# Patient Record
Sex: FEMALE | Race: WHITE | NOT HISPANIC OR LATINO | Employment: FULL TIME | URBAN - METROPOLITAN AREA
[De-identification: names, ages, dates, MRNs, and addresses within clinical notes are randomized per-mention and may not be internally consistent; named-entity substitution may affect disease eponyms.]

---

## 2017-04-24 ENCOUNTER — OFFICE VISIT (OUTPATIENT)
Dept: URGENT CARE | Facility: URGENT CARE | Age: 22
End: 2017-04-24
Payer: COMMERCIAL

## 2017-04-24 VITALS
WEIGHT: 266 LBS | DIASTOLIC BLOOD PRESSURE: 86 MMHG | OXYGEN SATURATION: 99 % | HEART RATE: 64 BPM | TEMPERATURE: 97.9 F | SYSTOLIC BLOOD PRESSURE: 128 MMHG

## 2017-04-24 DIAGNOSIS — M54.6 ACUTE RIGHT-SIDED THORACIC BACK PAIN: Primary | ICD-10-CM

## 2017-04-24 PROCEDURE — 99203 OFFICE O/P NEW LOW 30 MIN: CPT | Performed by: FAMILY MEDICINE

## 2017-04-24 RX ORDER — METHOCARBAMOL 750 MG/1
750 TABLET, FILM COATED ORAL 4 TIMES DAILY PRN
Qty: 28 TABLET | Refills: 0 | Status: SHIPPED | OUTPATIENT
Start: 2017-04-24 | End: 2017-05-01

## 2017-04-24 NOTE — MR AVS SNAPSHOT
"              After Visit Summary   2017    Misty Disla    MRN: [id number]           Patient Information     Date Of Birth          1995        Visit Information        Provider Department      2017 1:30 PM Suresh Kennedy DO [facility]        Today's Diagnoses     Acute right-sided thoracic back pain    -  1       Follow-ups after your visit        Who to contact     If you have questions or need follow up information about today's clinic visit or your schedule please contact [facility] directly at [phone].  Normal or non-critical lab and imaging results will be communicated to you by [employer], letter or phone within 4 business days after the clinic has received the results. If you do not hear from us within 7 days, please contact the clinic through Zupp[employer] or phone. If you have a critical or abnormal lab result, we will notify you by phone as soon as possible.  Submit refill requests through [employer] or call your pharmacy and they will forward the refill request to us. Please allow 3 business days for your refill to be completed.          Additional Information About Your Visit        MyChart Information     [employer] lets you send messages to your doctor, view your test results, renew your prescriptions, schedule appointments and more. To sign up, go to [email or web address] . Click on \"Log in\" on the left side of the screen, which will take you to the Welcome page. Then click on \"Sign up Now\" on the right side of the page.     You will be asked to enter the access code listed below, as well as some personal information. Please follow the directions to create your username and password.     Your access code is: [email or web address]  Expires: 2017  2:09 PM     Your access code will  in 90 days. If you need help or a new code, please call your [place] clinic or [phone].        Care EveryWhere ID     This is your Care " EveryWhere ID. This could be used by other organizations to access your [place] medical records  [id number]        Your Vitals Were     Pulse Temperature Pulse Oximetry             64 97.9  F (36.6  C) (Oral) 99%          Blood Pressure from Last 3 Encounters:   04/24/17 128/86    Weight from Last 3 Encounters:   04/24/17 266 lb (120.7 kg)              Today, you had the following     No orders found for display         Today's Medication Changes          These changes are accurate as of: 4/24/17  2:10 PM.  If you have any questions, ask your nurse or doctor.               Start taking these medicines.        Dose/Directions    methocarbamol 750 MG tablet   Commonly known as:  ROBAXIN   Used for:  Acute right-sided thoracic back pain   Started by:  Suresh Kennedy,         Dose:  750 mg   Take 1 tablet (750 mg) by mouth 4 times daily as needed for muscle spasms   Quantity:  28 tablet   Refills:  0            Where to get your medicines      These medications were sent to [facility] [address] [address]     Phone:  [phone]     methocarbamol 750 MG tablet                Primary Care Provider    None Specified       No primary provider on file.        Thank you!     Thank you for choosing [facility]  for your care. Our goal is always to provide you with excellent care. Hearing back from our patients is one way we can continue to improve our services. Please take a few minutes to complete the written survey that you may receive in the mail after your visit with us. Thank you!             Your Updated Medication List - Protect others around you: Learn how to safely use, store and throw away your medicines at www.disposemymeds.org.          This list is accurate as of: 4/24/17  2:10 PM.  Always use your most recent med list.                   Brand Name Dispense Instructions for use    IBUPROFEN PO           methocarbamol 750 MG tablet    ROBAXIN    28 tablet    Take 1 tablet (750 mg) by mouth 4 times daily as needed for muscle spasms       NASACORT ALLERGY 24HR NA          NEXPLANON SC

## 2017-04-24 NOTE — PROGRESS NOTES
SUBJECTIVEHPI: Misty Disla is a 22 year old female who presents for evaluation of back pain.  Symptoms began 1 day(s) ago, have been onset gradual and are worse.  Pain is located in the middle of back right region, with radiation to does not radiate.  Recent injury:none recalled by the patient  Personal hx of back pain is recurrent self limited episodes of low back pain in the past.  Pain is exacerbated by: bending and changing position.  Pain is relieved by: OTC NSAIDs.  Associated sx include: none.  Red flag symptoms: negative.  Noted onset yesterday after 3 hr car ride    No past medical history on file.  Allergies   Allergen Reactions     Dust Mites      Pollen Extract      Social History   Substance Use Topics     Smoking status: Never Smoker     Smokeless tobacco: Not on file     Alcohol use Not on file       ROS:CONSTITUTIONAL:NEGATIVE for fever, chills, change in weight  RESP:NEGATIVE for significant cough or SOB  : normal menstrual cycles, no hematuria/dysuria    OBJECTIVE:  /86 (BP Location: Left arm, Patient Position: Chair, Cuff Size: Adult Regular)  Pulse 64  Temp 97.9  F (36.6  C) (Oral)  Wt 266 lb (120.7 kg)  SpO2 99%  Back examination: Back symmetric, no curvature. ROM normal. No CVA tenderness., positive findings: Palpation and spasm rt mid to low backlimitation of motion - flexion: Moderate and extension: Moderate.  Straight leg raise test: negative.  GENERAL APPEARANCE: healthy, alert and no distress  NEURO: Normal strength and tone with no weakness or sensory deficit noted, reflexes normal   SKIN: no suspicious lesions or rashes      ICD-10-CM    1. Acute right-sided thoracic back pain M54.6 methocarbamol (ROBAXIN) 750 MG tablet     Continue prn heat or ice application.  Took Ibuprofen earlier today and feels like it helped  F/U PCP/IM/FP if persists, ED if worse

## 2017-04-24 NOTE — NURSING NOTE
Chief Complaint   Patient presents with     Back Pain     x last nigt which got worse today. No known injury        Initial /86 (BP Location: Left arm, Patient Position: Chair, Cuff Size: Adult Regular)  Pulse 64  Temp 97.9  F (36.6  C) (Oral)  Wt 266 lb (120.7 kg)  SpO2 99% There is no height or weight on file to calculate BMI.  Medication Reconciliation: complete

## 2017-11-20 ENCOUNTER — OFFICE VISIT (OUTPATIENT)
Dept: URGENT CARE | Facility: URGENT CARE | Age: 22
End: 2017-11-20
Payer: COMMERCIAL

## 2017-11-20 VITALS
DIASTOLIC BLOOD PRESSURE: 104 MMHG | HEART RATE: 95 BPM | TEMPERATURE: 97.2 F | WEIGHT: 263.3 LBS | OXYGEN SATURATION: 96 % | SYSTOLIC BLOOD PRESSURE: 152 MMHG | RESPIRATION RATE: 16 BRPM

## 2017-11-20 DIAGNOSIS — J06.9 VIRAL URI: Primary | ICD-10-CM

## 2017-11-20 PROCEDURE — 99213 OFFICE O/P EST LOW 20 MIN: CPT | Performed by: FAMILY MEDICINE

## 2017-11-20 RX ORDER — FLUTICASONE PROPIONATE 50 MCG
2 SPRAY, SUSPENSION (ML) NASAL DAILY
Qty: 1 BOTTLE | Refills: 0 | Status: SHIPPED | OUTPATIENT
Start: 2017-11-20 | End: 2017-12-20

## 2017-11-20 NOTE — NURSING NOTE
Chief Complaint   Patient presents with     Urgent Care     Pt states cold sxs, sore throat, sinus congestion 4x days        Initial BP (!) 152/104 (BP Location: Left arm, Patient Position: Chair, Cuff Size: Adult Regular)  Pulse 95  Temp 97.2  F (36.2  C) (Oral)  Resp 16  Wt 263 lb 4.8 oz (119.4 kg)  SpO2 96% There is no height or weight on file to calculate BMI.  Medication Reconciliation: complete

## 2017-11-20 NOTE — PATIENT INSTRUCTIONS

## 2017-11-20 NOTE — PROGRESS NOTES
SUBJECTIVE: Misty Disla is a 22 year old female presenting with a chief complaint of nasal congestion and cough .  Onset of symptoms was 3 day(s) ago.  Course of illness is same.    Severity moderate  Current and Associated symptoms: runny nose, stuffy nose and cough - non-productive  Treatment measures tried include None tried.  Predisposing factors include None.    No past medical history on file.  Allergies   Allergen Reactions     Dust Mites      Pollen Extract      Social History   Substance Use Topics     Smoking status: Never Smoker     Smokeless tobacco: Not on file     Alcohol use Not on file       ROS:  SKIN: no rash  GI: no vomiting    OBJECTIVE:  BP (!) 152/104 (BP Location: Left arm, Patient Position: Chair, Cuff Size: Adult Regular)  Pulse 95  Temp 97.2  F (36.2  C) (Oral)  Resp 16  Wt 263 lb 4.8 oz (119.4 kg)  SpO2 96%GENERAL APPEARANCE: healthy, alert and no distress  EYES: EOMI,  PERRL, conjunctiva clear  HENT: TM's normal bilaterally, rhinorrhea clear and oral mucous membranes moist, no erythema noted  NECK: supple, nontender, no lymphadenopathy  RESP: lungs clear to auscultation - no rales, rhonchi or wheezes  SKIN: no suspicious lesions or rashes      ICD-10-CM    1. Viral URI J06.9 fluticasone (FLONASE) 50 MCG/ACT spray    B97.89        Fluids/Rest, f/u if worse/not any better

## 2017-11-20 NOTE — MR AVS SNAPSHOT
After Visit Summary   11/20/2017    Misty Disla    MRN: [id number]           Patient Information     Date Of Birth          1995        Visit Information        Provider Department      11/20/2017 10:25 AM Suresh Kennedy DO [facility]        Today's Diagnoses     Viral URI    -  1      Care Instructions      Viral Upper Respiratory Illness (Adult)  You have a viral upper respiratory illness (URI), which is another term for the common cold. This illness is contagious during the first few days. It is spread through the air by coughing and sneezing. It may also be spread by direct contact (touching the sick person and then touching your own eyes, nose, or mouth). Frequent handwashing will decrease risk of spread. Most viral illnesses go away within 7 to 10 days with rest and simple home remedies. Sometimes the illness may last for several weeks. Antibiotics will not kill a virus, and they are generally not prescribed for this condition.    Home care    If symptoms are severe, rest at home for the first 2 to 3 days. When you resume activity, don't let yourself get too tired.    Avoid being exposed to cigarette smoke (yours or others ).    You may use acetaminophen or ibuprofen to control pain and fever, unless another medicine was prescribed. (Note: If you have chronic liver or kidney disease, have ever had a stomach ulcer or gastrointestinal bleeding, or are taking blood-thinning medicines, talk with your healthcare provider before using these medicines.) Aspirin should never be given to anyone under 18 years of age who is ill with a viral infection or fever. It may cause severe liver or brain damage.    Your appetite may be poor, so a light diet is fine. Avoid dehydration by drinking 6 to 8 glasses of fluids per day (water, soft drinks, juices, tea, or soup). Extra fluids will help loosen secretions in the nose and lungs.    Over-the-counter cold medicines  will not shorten the length of time you re sick, but they may be helpful for the following symptoms: cough, sore throat, and nasal and sinus congestion. (Note: Do not use decongestants if you have high blood pressure.)  Follow-up care  Follow up with your healthcare provider, or as advised.  When to seek medical advice  Call your healthcare provider right away if any of these occur:    Cough with lots of colored sputum (mucus)    Severe headache; face, neck, or ear pain    Difficulty swallowing due to throat pain    Fever of 100.4 F (38 C)  Call 911, or get immediate medical care  Call emergency services right away if any of these occur:    Chest pain, shortness of breath, wheezing, or difficulty breathing    Coughing up blood    Inability to swallow due to throat pain  Date Last Reviewed: 9/13/2015 2000-2017 [employer]. [address] [place] [address]. All rights reserved. This information is not intended as a substitute for professional medical care. Always follow your healthcare professional's instructions.                Follow-ups after your visit        Who to contact     If you have questions or need follow up information about today's clinic visit or your schedule please contact [facility] directly at [phone].  Normal or non-critical lab and imaging results will be communicated to you by [employer], letter or phone within 4 business days after the clinic has received the results. If you do not hear from us within 7 days, please contact the clinic through [employer] or phone. If you have a critical or abnormal lab result, we will notify you by phone as soon as possible.  Submit refill requests through [employer] or call your pharmacy and they will forward the refill request to us. Please allow 3 business days for your refill to be completed.          Additional Information About Your Visit        Oink Information     [employer] lets you send messages to  "your doctor, view your test results, renew your prescriptions, schedule appointments and more. To sign up, go to [email or web address] . Click on \"Log in\" on the left side of the screen, which will take you to the Welcome page. Then click on \"Sign up Now\" on the right side of the page.     You will be asked to enter the access code listed below, as well as some personal information. Please follow the directions to create your username and password.     Your access code is: [email or web address]  Expires: 2018 10:55 AM     Your access code will  in 90 days. If you need help or a new code, please call your [place] clinic or [phone].        Care EveryWhere ID     This is your Care EveryWhere ID. This could be used by other organizations to access your [place] medical records  [id number]        Your Vitals Were     Pulse Temperature Respirations Pulse Oximetry          95 97.2  F (36.2  C) (Oral) 16 96%         Blood Pressure from Last 3 Encounters:   17 (!) 152/104   17 128/86    Weight from Last 3 Encounters:   17 263 lb 4.8 oz (119.4 kg)   17 266 lb (120.7 kg)              Today, you had the following     No orders found for display         Today's Medication Changes          These changes are accurate as of: 17 10:55 AM.  If you have any questions, ask your nurse or doctor.               Start taking these medicines.        Dose/Directions    fluticasone 50 MCG/ACT spray   Commonly known as:  FLONASE   Used for:  Viral URI   Started by:  Suresh Kennedy DO        Dose:  2 spray   Spray 2 sprays in nostril daily   Quantity:  1 Bottle   Refills:  0            Where to get your medicines      These medications were sent to [facility] [address] [address]     Phone:  [phone]     fluticasone 50 MCG/ACT spray                Primary Care Provider    Physician No Ref-Primary       NO REF-PRIMARY " PHYSICIAN        Equal Access to Services     [facility] HORACIO : Hadii erik Jeffrey, walinada luqadaha, qaybta mayra marie. So [facility] [phone].    ATENCIÓN: Si habla español, tiene a barron disposición servicios gratuitos de asistencia lingüística. Llame al [phone].    We comply with applicable federal civil rights laws and [place] laws. We do not discriminate on the basis of race, color, national origin, age, disability, sex, sexual orientation, or gender identity.            Thank you!     Thank you for choosing [facility]  for your care. Our goal is always to provide you with excellent care. Hearing back from our patients is one way we can continue to improve our services. Please take a few minutes to complete the written survey that you may receive in the mail after your visit with us. Thank you!             Your Updated Medication List - Protect others around you: Learn how to safely use, store and throw away your medicines at www.disposemymeds.org.          This list is accurate as of: 11/20/17 10:55 AM.  Always use your most recent med list.                   Brand Name Dispense Instructions for use Diagnosis    fluticasone 50 MCG/ACT spray    FLONASE    1 Bottle    Spray 2 sprays in nostril daily    Viral URI       IBUPROFEN PO           NASACORT ALLERGY 24HR NA           NEXPLANON SC

## 2017-11-20 NOTE — LETTER
[facility] [place]  [address] [address]  [phone]      November 20, 2017    RE:  Misty Disla                                                                                                                                                       [address]   [facility] [address]            To whom it may concern:    Misty Disla is under my professional care for Medical.   She  may return to work with the following: No working or lifting restrictions on or about when improved.          Sincerely,        Suresh Kennedy    [facility]

## 2018-12-11 ENCOUNTER — OFFICE VISIT (OUTPATIENT)
Dept: URGENT CARE | Facility: URGENT CARE | Age: 23
End: 2018-12-11
Payer: COMMERCIAL

## 2018-12-11 ENCOUNTER — ANCILLARY PROCEDURE (OUTPATIENT)
Dept: GENERAL RADIOLOGY | Facility: CLINIC | Age: 23
End: 2018-12-11
Attending: PHYSICIAN ASSISTANT
Payer: COMMERCIAL

## 2018-12-11 VITALS
WEIGHT: 275 LBS | HEART RATE: 95 BPM | TEMPERATURE: 97.7 F | DIASTOLIC BLOOD PRESSURE: 92 MMHG | OXYGEN SATURATION: 98 % | SYSTOLIC BLOOD PRESSURE: 137 MMHG

## 2018-12-11 DIAGNOSIS — R10.32 ABDOMINAL PAIN, LEFT LOWER QUADRANT: ICD-10-CM

## 2018-12-11 DIAGNOSIS — R10.32 ABDOMINAL PAIN, LEFT LOWER QUADRANT: Primary | ICD-10-CM

## 2018-12-11 DIAGNOSIS — R12 HEARTBURN: ICD-10-CM

## 2018-12-11 DIAGNOSIS — R14.3 FLATULENCE, ERUCTATION AND GAS PAIN: ICD-10-CM

## 2018-12-11 DIAGNOSIS — R14.2 FLATULENCE, ERUCTATION AND GAS PAIN: ICD-10-CM

## 2018-12-11 DIAGNOSIS — R14.1 FLATULENCE, ERUCTATION AND GAS PAIN: ICD-10-CM

## 2018-12-11 LAB
ALBUMIN UR-MCNC: NEGATIVE MG/DL
APPEARANCE UR: CLEAR
BACTERIA #/AREA URNS HPF: ABNORMAL /HPF
BETA HCG QUAL IFA URINE: NEGATIVE
BILIRUB UR QL STRIP: NEGATIVE
COLOR UR AUTO: YELLOW
GLUCOSE UR STRIP-MCNC: NEGATIVE MG/DL
HGB UR QL STRIP: ABNORMAL
KETONES UR STRIP-MCNC: NEGATIVE MG/DL
LEUKOCYTE ESTERASE UR QL STRIP: NEGATIVE
MUCOUS THREADS #/AREA URNS LPF: PRESENT /LPF
NITRATE UR QL: NEGATIVE
NON-SQ EPI CELLS #/AREA URNS LPF: ABNORMAL /LPF
PH UR STRIP: 5.5 PH (ref 5–7)
RBC #/AREA URNS AUTO: ABNORMAL /HPF
SOURCE: ABNORMAL
SP GR UR STRIP: >1.03 (ref 1–1.03)
SPECIMEN SOURCE: NORMAL
UROBILINOGEN UR STRIP-ACNC: 0.2 EU/DL (ref 0.2–1)
WBC #/AREA URNS AUTO: ABNORMAL /HPF
WET PREP SPEC: NORMAL

## 2018-12-11 PROCEDURE — 84703 CHORIONIC GONADOTROPIN ASSAY: CPT | Performed by: PHYSICIAN ASSISTANT

## 2018-12-11 PROCEDURE — 87491 CHLMYD TRACH DNA AMP PROBE: CPT | Performed by: PHYSICIAN ASSISTANT

## 2018-12-11 PROCEDURE — 99214 OFFICE O/P EST MOD 30 MIN: CPT | Performed by: PHYSICIAN ASSISTANT

## 2018-12-11 PROCEDURE — 74019 RADEX ABDOMEN 2 VIEWS: CPT | Mod: FY

## 2018-12-11 PROCEDURE — 87591 N.GONORRHOEAE DNA AMP PROB: CPT | Performed by: PHYSICIAN ASSISTANT

## 2018-12-11 PROCEDURE — 81001 URINALYSIS AUTO W/SCOPE: CPT | Performed by: PHYSICIAN ASSISTANT

## 2018-12-11 PROCEDURE — 87210 SMEAR WET MOUNT SALINE/INK: CPT | Performed by: PHYSICIAN ASSISTANT

## 2018-12-11 NOTE — PATIENT INSTRUCTIONS
(R10.32) Abdominal pain, left lower quadrant  (primary encounter diagnosis)  Comment:   Plan: UA with Microscopic reflex to Culture, Beta HCG        qual IFA urine, Wet prep, NEISSERIA GONORRHOEA         PCR, CHLAMYDIA TRACHOMATIS PCR, XR Abdomen 2         Views            (R14.3,  R14.1,  R14.2) Flatulence, eructation and gas pain  Comment:   Plan: hyoscyamine SL (LEVSIN/SL) 0.125 MG sublingual         tablet            (R12) Heartburn  Comment:   Plan: ranitidine (ZANTAC) 150 MG tablet            [place] diet today.    Follow up with primary clinic should symptoms persist, to ED should symptoms worsen.

## 2018-12-11 NOTE — PROGRESS NOTES
SUBJECTIVE  HPI:  Misty Disla is a 23 year old female who presents with the CC of abdominal/pelvic pain.    Patient states that she awoke with moderate to severe LLQ pain.  Pain vacillates between 4-8 on a 0-10 pain scale.      No change in pain with BM this morning.  Stools were normal without blood.    Denies any nausea or vomiting.    Denies any fevers.    Denies any dysuria, urinary frequency or urgency.    Denies any back pain.   Denies any vaginal discharge     Denies any possible injury to her abdomen or back.      Movement increases the pain.      Pain is described as sharp and stabbing.      No previous similar symptoms.      No previous abdominal surgeries.      Has nexplanon, LMP 5/2018    Has had some cold and cough symptoms for the past couple of weeks that has mostly cleared.      Primary Care clinic is [facility].      No past medical history on file.   Denies any significant PMH    Current Outpatient Medications   Medication Sig Dispense Refill     Etonogestrel (NEXPLANON SC)        Homeopathic Products (ZICAM ALLERGY RELIEF NA) Spray in nostril as needed       IBUPROFEN PO        Triamcinolone Acetonide (NASACORT ALLERGY 24HR NA)        Social History     Tobacco Use     Smoking status: Never Smoker     Smokeless tobacco: Never Used   Substance Use Topics     Alcohol use: Not on file       ROS:  CONSTITUTIONAL:NEGATIVE for fever, chills, change in weight  INTEGUMENTARY/SKIN: NEGATIVE for worrisome rashes, moles or lesions  EYES: NEGATIVE for vision changes or irritation  ENT/MOUTH: NEGATIVE for ear, mouth and throat problems  RESP:as per HPICV: NEGATIVE for chest pain, palpitations or peripheral edema  GI: as per HPI  : as per HPI  MUSCULOSKELETAL: NEGATIVE for significant arthralgias or myalgia  NEURO: NEGATIVE for weakness, dizziness or paresthesias  ENDOCRINE: NEGATIVE for temperature intolerance, skin/hair changes  HEME/ALLERGY/IMMUNE: NEGATIVE for bleeding  problems  Review of systems negative except as stated above.    OBJECTIVE:  BP (!) 137/92   Pulse 95   Temp 97.7  F (36.5  C) (Oral)   Wt 124.7 kg (275 lb)   SpO2 98%   GENERAL APPEARANCE: healthy, alert and no distress  EYES: EOMI,  PERRL, conjunctiva clear  HENT: ear canals and TM's normal.  Nose and mouth without ulcers, erythema or lesions  NECK: supple, nontender, no lymphadenopathy  RESP: lungs clear to auscultation - no rales, rhonchi or wheezes  CV: regular rates and rhythm, normal S1 S2, no murmur noted  ABDOMEN: soft, normal bowel sounds, tenderness epigastric and left lower quadrant.  No rebound.  No masses.    NEURO: Normal strength and tone, sensory exam grossly normal,  normal speech and mentation  SKIN: no suspicious lesions or rashes    KUB: non specific bowel gas/stool pattern, no obstruction    Results for orders placed or performed in visit on 12/11/18   UA with Microscopic reflex to Culture   Result Value Ref Range    Color Urine Yellow     Appearance Urine Clear     Glucose Urine Negative NEG^Negative mg/dL    Bilirubin Urine Negative NEG^Negative    Ketones Urine Negative NEG^Negative mg/dL    Specific Gravity Urine >1.030 1.003 - 1.035    pH Urine 5.5 5.0 - 7.0 pH    Protein Albumin Urine Negative NEG^Negative mg/dL    Urobilinogen Urine 0.2 0.2 - 1.0 EU/dL    Nitrite Urine Negative NEG^Negative    Blood Urine Trace (A) NEG^Negative    Leukocyte Esterase Urine Negative NEG^Negative    Source Midstream Urine     WBC Urine 0 - 5 OTO5^0 - 5 /HPF    RBC Urine O - 2 OTO2^O - 2 /HPF    Squamous Epithelial /LPF Urine Few FEW^Few /LPF    Bacteria Urine Moderate (A) NEG^Negative /HPF    Mucous Urine Present (A) NEG^Negative /LPF   Beta HCG qual IFA urine   Result Value Ref Range    Beta HCG Qual IFA Urine Negative NEG^Negative      Wet prep   Result Value Ref Range    Specimen Description Vagina     Wet Prep No Trichomonas seen     Wet Prep No clue cells seen     Wet Prep No yeast seen         (R10.32) Abdominal pain, left lower quadrant  (primary encounter diagnosis)  Comment:   Plan: UA with Microscopic reflex to Culture, Beta HCG        qual IFA urine, Wet prep, NEISSERIA GONORRHOEA         PCR, CHLAMYDIA TRACHOMATIS PCR, XR Abdomen 2         Views            (R14.3,  R14.1,  R14.2) Flatulence, eructation and gas pain  Comment:   Plan: hyoscyamine SL (LEVSIN/SL) 0.125 MG sublingual         tablet            (R12) Heartburn  Comment:   Plan: ranitidine (ZANTAC) 150 MG tablet            [place] diet today.    Follow up with primary clinic should symptoms persist, to ED should symptoms worsen.

## 2018-12-12 LAB
C TRACH DNA SPEC QL NAA+PROBE: NEGATIVE
N GONORRHOEA DNA SPEC QL NAA+PROBE: NEGATIVE
SPECIMEN SOURCE: NORMAL
SPECIMEN SOURCE: NORMAL

## 2018-12-13 ENCOUNTER — TELEPHONE (OUTPATIENT)
Dept: URGENT CARE | Facility: URGENT CARE | Age: 23
End: 2018-12-13

## 2018-12-15 NOTE — TELEPHONE ENCOUNTER
Pt call back regarding message. Pt stated that she had seen results online. Pt didn't have any questions or concerns. Dianna Barnes CMA

## 2019-02-15 ENCOUNTER — HEALTH MAINTENANCE LETTER (OUTPATIENT)
Age: 24
End: 2019-02-15

## 2019-04-30 ENCOUNTER — OFFICE VISIT (OUTPATIENT)
Dept: URGENT CARE | Facility: URGENT CARE | Age: 24
End: 2019-04-30
Payer: COMMERCIAL

## 2019-04-30 VITALS
DIASTOLIC BLOOD PRESSURE: 87 MMHG | HEART RATE: 64 BPM | BODY MASS INDEX: 41.18 KG/M2 | RESPIRATION RATE: 14 BRPM | HEIGHT: 69 IN | SYSTOLIC BLOOD PRESSURE: 135 MMHG | WEIGHT: 278 LBS | TEMPERATURE: 97.3 F | OXYGEN SATURATION: 98 %

## 2019-04-30 DIAGNOSIS — T14.8XXA MUSCLE STRAIN: Primary | ICD-10-CM

## 2019-04-30 PROCEDURE — 99213 OFFICE O/P EST LOW 20 MIN: CPT | Performed by: PHYSICIAN ASSISTANT

## 2019-04-30 RX ORDER — METHOCARBAMOL 750 MG/1
750 TABLET, FILM COATED ORAL 4 TIMES DAILY PRN
Qty: 40 TABLET | Refills: 0 | Status: SHIPPED | OUTPATIENT
Start: 2019-04-30 | End: 2020-01-17

## 2019-04-30 RX ORDER — IBUPROFEN 800 MG/1
800 TABLET, FILM COATED ORAL EVERY 8 HOURS PRN
Qty: 30 TABLET | Refills: 0 | Status: SHIPPED | OUTPATIENT
Start: 2019-04-30 | End: 2020-01-17

## 2019-04-30 ASSESSMENT — MIFFLIN-ST. JEOR: SCORE: 2074.99

## 2019-04-30 NOTE — LETTER
[facility] [facility] [place]  [address] [address]  [phone]      April 30, 2019    RE:  Misty Disla                                                                                                                                                       [address]   [facility] [address]            To whom it may concern:    Misty Disla was seen in clinic today.        Sincerely,        Christine Pennington    [facility]

## 2019-04-30 NOTE — PROGRESS NOTES
"Patient presents with:  Urgent Care: upper back pain for a few days.     SUBJECTIVE:  Chief Complaint   Patient presents with     Urgent Care     upper back pain for a few days.      Misty Disla is a 24 year old female presents with a chief complaint of upper back pain and spasms for the past few days.  Onset was after vomiting one time this weekend.  The GI symptoms have resolved.    Denies any cough or runny nose or trauma.      No past medical history on file.     Seasonal allergies takes zycam prn    FH:  Denies any significant FH    There is no problem list on file for this patient.    Social History     Tobacco Use     Smoking status: Never Smoker     Smokeless tobacco: Never Used   Substance Use Topics     Alcohol use: Not on file       ROS:  CONSTITUTIONAL:NEGATIVE for fever, chills, change in weight  INTEGUMENTARY/SKIN: NEGATIVE for worrisome rashes, moles or lesions  EYES: NEGATIVE for vision changes or irritation  ENT/MOUTH: NEGATIVE for ear, mouth and throat problems  RESP:NEGATIVE for significant cough or SOB  CV: NEGATIVE for chest pain, palpitations or peripheral edema  GI: NEGATIVE for nausea, abdominal pain, heartburn, or change in bowel habits  : negative  MUSCULOSKELETAL: as per HPI  Review of systems negative except as stated above.    EXAM:   /87   Pulse 64   Temp 97.3  F (36.3  C) (Oral)   Resp 14   Ht 1.76 m (5' 9.29\")   Wt 126.1 kg (278 lb)   SpO2 98%   BMI 40.71 kg/m    Gen: healthy and healthy,alert,no   GENERAL APPEARANCE: healthy, alert and no distress  NECK: supple, non-tender to palpation, FROM   CHEST: clear to auscultation  CV: regular rate and rhythm  SKIN: no suspicious lesions or rashes  BACK: no vertebral tenderness.  Tenderness to palpation over trapezius bilaterally      T14.8XXA) Muscle strain  (primary encounter diagnosis)  Comment: upper back  Plan: ibuprofen (ADVIL/MOTRIN) 800 MG tablet,         methocarbamol (ROBAXIN) 750 MG tablet          Gentle " stretches followed by ice to affected areas over thin cloth - do for 20 minutes 2-3 times a day

## 2019-04-30 NOTE — PATIENT INSTRUCTIONS
(T14.8XXA) Muscle strain  (primary encounter diagnosis)  Comment: upper back  Plan: ibuprofen (ADVIL/MOTRIN) 800 MG tablet,         methocarbamol (ROBAXIN) 750 MG tablet          Gentle stretches followed by ice to affected areas over thin cloth - do for 20 minutes 2-3 times a day

## 2019-06-20 ENCOUNTER — OFFICE VISIT (OUTPATIENT)
Dept: URGENT CARE | Facility: URGENT CARE | Age: 24
End: 2019-06-20
Payer: COMMERCIAL

## 2019-06-20 VITALS
WEIGHT: 278 LBS | OXYGEN SATURATION: 96 % | TEMPERATURE: 97.9 F | RESPIRATION RATE: 16 BRPM | DIASTOLIC BLOOD PRESSURE: 84 MMHG | BODY MASS INDEX: 40.71 KG/M2 | SYSTOLIC BLOOD PRESSURE: 118 MMHG | HEART RATE: 75 BPM

## 2019-06-20 DIAGNOSIS — R19.5 DARK STOOLS: Primary | ICD-10-CM

## 2019-06-20 DIAGNOSIS — R51.9 NONINTRACTABLE HEADACHE, UNSPECIFIED CHRONICITY PATTERN, UNSPECIFIED HEADACHE TYPE: ICD-10-CM

## 2019-06-20 DIAGNOSIS — R11.0 NAUSEA: ICD-10-CM

## 2019-06-20 LAB
BASOPHILS # BLD AUTO: 0 10E9/L (ref 0–0.2)
BASOPHILS NFR BLD AUTO: 0.4 %
DIFFERENTIAL METHOD BLD: NORMAL
EOSINOPHIL # BLD AUTO: 0.2 10E9/L (ref 0–0.7)
EOSINOPHIL NFR BLD AUTO: 2.2 %
ERYTHROCYTE [DISTWIDTH] IN BLOOD BY AUTOMATED COUNT: 13.8 % (ref 10–15)
HCT VFR BLD AUTO: 43.4 % (ref 35–47)
HGB BLD-MCNC: 14.3 G/DL (ref 11.7–15.7)
LYMPHOCYTES # BLD AUTO: 1.3 10E9/L (ref 0.8–5.3)
LYMPHOCYTES NFR BLD AUTO: 16.4 %
MCH RBC QN AUTO: 28.4 PG (ref 26.5–33)
MCHC RBC AUTO-ENTMCNC: 32.9 G/DL (ref 31.5–36.5)
MCV RBC AUTO: 86 FL (ref 78–100)
MONOCYTES # BLD AUTO: 0.4 10E9/L (ref 0–1.3)
MONOCYTES NFR BLD AUTO: 4.6 %
NEUTROPHILS # BLD AUTO: 6.3 10E9/L (ref 1.6–8.3)
NEUTROPHILS NFR BLD AUTO: 76.4 %
PLATELET # BLD AUTO: 286 10E9/L (ref 150–450)
RBC # BLD AUTO: 5.03 10E12/L (ref 3.8–5.2)
WBC # BLD AUTO: 8.2 10E9/L (ref 4–11)

## 2019-06-20 PROCEDURE — 99213 OFFICE O/P EST LOW 20 MIN: CPT | Performed by: PHYSICIAN ASSISTANT

## 2019-06-20 PROCEDURE — 36415 COLL VENOUS BLD VENIPUNCTURE: CPT | Performed by: PHYSICIAN ASSISTANT

## 2019-06-20 PROCEDURE — 85025 COMPLETE CBC W/AUTO DIFF WBC: CPT | Performed by: PHYSICIAN ASSISTANT

## 2019-06-20 RX ORDER — ONDANSETRON 4 MG/1
8 TABLET, ORALLY DISINTEGRATING ORAL ONCE
Status: COMPLETED | OUTPATIENT
Start: 2019-06-20 | End: 2019-06-20

## 2019-06-20 RX ADMIN — ONDANSETRON 8 MG: 4 TABLET, ORALLY DISINTEGRATING ORAL at 09:45

## 2019-06-20 NOTE — NURSING NOTE
The following medication was given:     MEDICATION: Zofran 8 mg  ROUTE: PO  SITE: mouth  DOSE: 8 mg  LOT #: [id number]  :  [facility]  EXPIRATION DATE: 1/2021  NDC#: [id number]

## 2019-06-20 NOTE — PROGRESS NOTES
SUBJECTIVE:   Misty Disla is a 24 year old female presenting with a chief complaint of   Chief Complaint   Patient presents with     Urgent Care     stomach issues, dark stool, blood in stool       She is an established patient of [facility].    Gastro    Onset of symptoms was 1 day(s) ago.  Course of illness is waxing and waning.    Severity moderate  Current and Associated symptoms:  Cramping lower abdominal discomfort which is now resolved, today with nausea which is improving and mild headache. Has a history of headaches.   Aggravating factors: nothing.    Alleviating factors: nothing, has not taken any medications. Has been pushing fluids and eating increased fiber.   Diarrhea: None  Stools: Last BM yesterday approximately 1600 and that small amount of blood in bowl and on toilet paper   Noticed yesterday morning some dark stools  Vomitting: None  Appetite: slightly diminished  Risk factors: none      Review of Systems   ROS: 10 point ROS neg other than the symptoms noted above in the HPI.    No past medical history on file.   Denies    No family history on file.   Hypertension     Current Outpatient Medications   Medication Sig Dispense Refill     Etonogestrel (NEXPLANON SC)        Homeopathic Products (ZICAM ALLERGY RELIEF NA) Spray in nostril as needed       ibuprofen (ADVIL/MOTRIN) 800 MG tablet Take 1 tablet (800 mg) by mouth every 8 hours as needed for moderate pain (Patient not taking: Reported on 6/20/2019) 30 tablet 0     IBUPROFEN PO        methocarbamol (ROBAXIN) 750 MG tablet Take 1 tablet (750 mg) by mouth 4 times daily as needed (Patient not taking: Reported on 6/20/2019) 40 tablet 0     ranitidine (ZANTAC) 150 MG tablet Take 1 tablet (150 mg) by mouth 2 times daily (Patient not taking: Reported on 6/20/2019) 180 tablet 3     Social History     Tobacco Use     Smoking status: Never Smoker     Smokeless tobacco: Never Used   Substance Use Topics     Alcohol use: Not on file        OBJECTIVE  /84   Pulse 75   Temp 97.9  F (36.6  C) (Oral)   Resp 16   Wt 126.1 kg (278 lb)   SpO2 96%   BMI 40.71 kg/m    Physical Exam   Constitutional: She is oriented to person, place, and time. She appears well-developed and well-nourished.   HENT:   Head: Normocephalic and atraumatic.   Mouth/Throat: Oropharynx is clear and moist and mucous membranes are normal. No oropharyngeal exudate or posterior oropharyngeal erythema. No tonsillar exudate.   Eyes: Conjunctivae and EOM are normal.   Neck: Normal range of motion.   Cardiovascular: Normal rate, regular rhythm and normal heart sounds.   Pulmonary/Chest: Effort normal and breath sounds normal.   Abdominal: Soft. Bowel sounds are normal. She exhibits no distension and no mass. There is no tenderness. There is no rebound and no guarding. No hernia.   Musculoskeletal: Normal range of motion.   Neurological: She is alert and oriented to person, place, and time.   Skin: Skin is warm and dry.   Psychiatric: She has a normal mood and affect. Her behavior is normal.   Nursing note and vitals reviewed.        Labs:  Results for orders placed or performed in visit on 06/20/19 (from the past 24 hour(s))   CBC with platelets and differential   Result Value Ref Range    WBC 8.2 4.0 - 11.0 10e9/L    RBC Count 5.03 3.8 - 5.2 10e12/L    Hemoglobin 14.3 11.7 - 15.7 g/dL    Hematocrit 43.4 35.0 - 47.0 %    MCV 86 78 - 100 fl    MCH 28.4 26.5 - 33.0 pg    MCHC 32.9 31.5 - 36.5 g/dL    RDW 13.8 10.0 - 15.0 %    Platelet Count 286 150 - 450 10e9/L    % Neutrophils 76.4 %    % Lymphocytes 16.4 %    % Monocytes 4.6 %    % Eosinophils 2.2 %    % Basophils 0.4 %    Absolute Neutrophil 6.3 1.6 - 8.3 10e9/L    Absolute Lymphocytes 1.3 0.8 - 5.3 10e9/L    Absolute Monocytes 0.4 0.0 - 1.3 10e9/L    Absolute Eosinophils 0.2 0.0 - 0.7 10e9/L    Absolute Basophils 0.0 0.0 - 0.2 10e9/L    Diff Method Automated Method        X-Ray was not done.    ASSESSMENT:      ICD-10-CM     1. Dark stools R19.5 CBC with platelets and differential   2. Nausea R11.0 ondansetron (ZOFRAN-ODT) ODT tab 8 mg        Medical Decision Making:    Differential Diagnosis:  Gastro: upper GI bleed, lower GI bleed, hemorrhoid, viral gastroenteritis. Patient's symptoms have been resolving, reports normal BM since 1 dark BM yesterday. CBC in clinic today normal, ruling out anemia that could suggest GI bleed.     Serious Comorbid Conditions:  Adult:  None    PLAN:    Gastro: Fluids, fiber, time, rest, BRAT Diet. Continue to monitor symptoms. Tylenol and Ibuprofen as needed for headache.     Followup:    If not improving or if condition worsens, follow up with your Primary Care Provider, if acutely worsening over the next 12-24 hours go to the ED.     Patient Instructions   Your lab result looked normal today. Based on the results there is no evidence of anemia. Given your dark stools have improved, let's continue to monitor symptoms. Return to care if any new or worsening. Continue to push fluids and increase fiber.       Nimesh Patterson PA-C

## 2019-06-20 NOTE — PATIENT INSTRUCTIONS
Your lab result looked normal today. Based on the results there is no evidence of anemia. Given your dark stools have improved, let's continue to monitor symptoms. Return to care if any new or worsening. Continue to push fluids and increase fiber.

## 2020-01-14 ENCOUNTER — OFFICE VISIT (OUTPATIENT)
Dept: URGENT CARE | Facility: URGENT CARE | Age: 25
End: 2020-01-14
Payer: COMMERCIAL

## 2020-01-14 VITALS
BODY MASS INDEX: 40.71 KG/M2 | OXYGEN SATURATION: 98 % | SYSTOLIC BLOOD PRESSURE: 140 MMHG | WEIGHT: 278 LBS | DIASTOLIC BLOOD PRESSURE: 82 MMHG | HEART RATE: 95 BPM | TEMPERATURE: 98 F | RESPIRATION RATE: 16 BRPM

## 2020-01-14 DIAGNOSIS — R10.30 LOWER ABDOMINAL PAIN: ICD-10-CM

## 2020-01-14 DIAGNOSIS — N39.0 ACUTE UTI: Primary | ICD-10-CM

## 2020-01-14 LAB
ALBUMIN UR-MCNC: NEGATIVE MG/DL
APPEARANCE UR: CLEAR
BACTERIA #/AREA URNS HPF: ABNORMAL /HPF
BILIRUB UR QL STRIP: NEGATIVE
COLOR UR AUTO: YELLOW
GLUCOSE UR STRIP-MCNC: NEGATIVE MG/DL
HCG UR QL: NEGATIVE
HGB UR QL STRIP: ABNORMAL
KETONES UR STRIP-MCNC: NEGATIVE MG/DL
LEUKOCYTE ESTERASE UR QL STRIP: NEGATIVE
NITRATE UR QL: NEGATIVE
NON-SQ EPI CELLS #/AREA URNS LPF: ABNORMAL /LPF
PH UR STRIP: 5.5 PH (ref 5–7)
RBC #/AREA URNS AUTO: ABNORMAL /HPF
SOURCE: ABNORMAL
SP GR UR STRIP: 1.02 (ref 1–1.03)
SPECIMEN SOURCE: NORMAL
UROBILINOGEN UR STRIP-ACNC: 0.2 EU/DL (ref 0.2–1)
WBC #/AREA URNS AUTO: ABNORMAL /HPF
WET PREP SPEC: NORMAL

## 2020-01-14 PROCEDURE — 81025 URINE PREGNANCY TEST: CPT | Performed by: PHYSICIAN ASSISTANT

## 2020-01-14 PROCEDURE — 81001 URINALYSIS AUTO W/SCOPE: CPT | Performed by: PHYSICIAN ASSISTANT

## 2020-01-14 PROCEDURE — 99213 OFFICE O/P EST LOW 20 MIN: CPT | Performed by: PHYSICIAN ASSISTANT

## 2020-01-14 PROCEDURE — 87210 SMEAR WET MOUNT SALINE/INK: CPT | Performed by: PHYSICIAN ASSISTANT

## 2020-01-14 RX ORDER — CEFDINIR 300 MG/1
300 CAPSULE ORAL 2 TIMES DAILY
Qty: 14 CAPSULE | Refills: 0 | Status: SHIPPED | OUTPATIENT
Start: 2020-01-14 | End: 2020-01-17 | Stop reason: ALTCHOICE

## 2020-01-14 NOTE — PATIENT INSTRUCTIONS
(N39.0) Acute UTI  (primary encounter diagnosis)  Comment:   Plan: cefdinir (OMNICEF) 300 MG capsule            (R10.30) Lower abdominal pain  Comment:   Plan: UA with Microscopic reflex to Culture, Wet         prep, HCG qualitative urine, CANCELED: Beta HCG        qual IFA urine            F/U with PCP within 3 days should symptoms persist or worsen.

## 2020-01-14 NOTE — PROGRESS NOTES
Patient presents with:  Abdominal Pain: lower abdominal pain X 1 day     SUBJECTIVE:   Misty Disla is a 24 year old female who  presents today with lower abdominal discomfort since yesterday.    Denies any nausea or vomiting.  Appetite is fairly normal.    Denies any vaginal discharge.  Did have urinary frequency and voided in small amounts yesterday.  Denies any dysuria.      Denies any back pain.      Last BM was this morning and was slightly loose.      Low grade fever last night.      Her fiance had similar symptoms last night with loose stools.  He is fine today.    2 days ago they had baked chicken breast at home.     LMP: 2018, has an implant    PMH:  Denies any significant PMH    FH:  Denies any contributory FH    There is no problem list on file for this patient.    Social History     Tobacco Use     Smoking status: Never Smoker     Smokeless tobacco: Never Used   Substance Use Topics     Alcohol use: Not on file       ROS:   CONSTITUTIONAL:as per HPI  INTEGUMENTARY/SKIN: NEGATIVE for worrisome rashes, moles or lesions  EYES: NEGATIVE for vision changes or irritation  ENT/MOUTH: NEGATIVE for ear, mouth and throat problems  RESP:NEGATIVE for significant cough or SOB  CV: NEGATIVE for chest pain, palpitations or peripheral edema  GI: as per HPI  : as per HPI  MUSCULOSKELETAL: NEGATIVE for significant arthralgias or myalgia  NEURO: NEGATIVE for weakness, dizziness or paresthesias  ENDOCRINE: NEGATIVE for temperature intolerance, skin/hair changes  Review of systems negative except as stated above.    OBJECTIVE:  BP (!) 140/82   Pulse 95   Temp 98  F (36.7  C) (Oral)   Resp 16   Wt 126.1 kg (278 lb)   SpO2 98%   BMI 40.71 kg/m    GENERAL APPEARANCE: healthy, alert and no distress  ABDOMEN:  soft, mild suprapubic tenderness, slightly worse on left, no reboundr, no HSM or masses and bowel sounds normal  BACK: No CVA tenderness  GU_female: self wet prep   SKIN: no suspicious lesions or  monica    (N39.0) Acute UTI  (primary encounter diagnosis)  Comment:   Plan: cefdinir (OMNICEF) 300 MG capsule            (R10.30) Lower abdominal pain  Comment:   Plan: UA with Microscopic reflex to Culture, Wet         prep, HCG qualitative urine, CANCELED: Beta HCG        qual IFA urine            F/U with PCP within 3 days should symptoms persist or worsen.

## 2020-01-14 NOTE — LETTER
[facility] [place]  [address] [address]  [phone]      January 14, 2020    RE:  Misty Disla                                                                                                                                                       [address]   [facility] [address]            To whom it may concern:    Misty Disla was seen in clinic today.  She may return to work tomorrow.      Sincerely,        Christine Pennington PA-C    [facility]

## 2020-01-17 ENCOUNTER — OFFICE VISIT (OUTPATIENT)
Dept: INTERNAL MEDICINE | Facility: CLINIC | Age: 25
End: 2020-01-17
Payer: COMMERCIAL

## 2020-01-17 VITALS
SYSTOLIC BLOOD PRESSURE: 122 MMHG | TEMPERATURE: 98.5 F | RESPIRATION RATE: 16 BRPM | OXYGEN SATURATION: 98 % | BODY MASS INDEX: 40.71 KG/M2 | DIASTOLIC BLOOD PRESSURE: 86 MMHG | HEART RATE: 90 BPM | WEIGHT: 278 LBS

## 2020-01-17 DIAGNOSIS — E66.01 MORBID OBESITY (H): ICD-10-CM

## 2020-01-17 DIAGNOSIS — R10.32 LLQ ABDOMINAL PAIN: Primary | ICD-10-CM

## 2020-01-17 PROCEDURE — 99213 OFFICE O/P EST LOW 20 MIN: CPT | Performed by: PHYSICIAN ASSISTANT

## 2020-01-17 RX ORDER — HYOSCYAMINE SULFATE 0.125 MG
0.12 TABLET ORAL EVERY 6 HOURS PRN
Qty: 20 TABLET | Refills: 0 | Status: SHIPPED | OUTPATIENT
Start: 2020-01-17 | End: 2021-04-05

## 2020-01-17 NOTE — PROGRESS NOTES
Subjective     Misty Disla is a 24 year old female who presents to clinic today for the following health issues:    HPI   ED/UC Followup:    Facility:  [facility]  Date of visit: 1/14/20  Reason for visit: UTI  Current Status: Still experiencing some lower intestinal pain. Pain is better than it was in UC. States that stools have been a bit darker in color and somewhat painful       Abdominal Pain      Duration: lower left pain     Description (location/character/radiation): cramping        Associated flank pain: None    Intensity:  mild,     Accompanying signs and symptoms:        Fever/Chills: no   Slight fever in the middle of the night when this started, no temp noted since then       Gas/Bloating: no        Nausea/vomitting: no        Diarrhea: no        Dysuria or Hematuria: no     History (previous similar pain/trauma/previous testing): UC visit testing     Negative and neg UA- though given abx.    Boyfriend with mild similar symptoms.     Precipitating or alleviating factors:       Pain worse with eating/BM/urination: no        Pain relieved by BM: no     Therapies tried and outcome: None    LMP:  Nexplanon      BP Readings from Last 3 Encounters:   01/17/20 122/86   01/14/20 (!) 140/82   06/20/19 118/84    Wt Readings from Last 3 Encounters:   01/17/20 126.1 kg (278 lb)   01/14/20 126.1 kg (278 lb)   06/20/19 126.1 kg (278 lb)                    -------------------------------------  Reviewed and updated as needed this visit by Provider         Review of Systems   ROS COMP: Constitutional, HEENT, cardiovascular, pulmonary, gi and gu systems are negative, except as otherwise noted.      Objective    /86 (BP Location: Left arm, Patient Position: Chair, Cuff Size: Adult Large)   Pulse 90   Temp 98.5  F (36.9  C) (Oral)   Resp 16   Wt 126.1 kg (278 lb)   SpO2 98%   BMI 40.71 kg/m    Body mass index is 40.71 kg/m .  Physical Exam   GENERAL: healthy, alert and no distress  RESP: lungs clear to  "auscultation - no rales, rhonchi or wheezes  CV: regular rate and rhythm, normal S1 S2, no S3 or S4, no murmur, click or rub, no peripheral edema and peripheral pulses strong  ABDOMEN: tenderness LLQ and bowel sounds normal  MS: no gross musculoskeletal defects noted, no edema  SKIN: no suspicious lesions or rashes    Diagnostic Test Results:  none         Assessment & Plan     1. LLQ abdominal pain  Possible viral  Though has had this in the past - abd cramping  Medication as noted  Monitor   - hyoscyamine (ANASPAZ/LEVSIN) 0.125 MG tablet; Take 1 tablet (125 mcg) by mouth every 6 hours as needed for cramping  Dispense: 20 tablet; Refill: 0    2. Morbid obesity (H)         BMI:   Estimated body mass index is 40.71 kg/m  as calculated from the following:    Height as of 4/30/19: 1.76 m (5' 9.29\").    Weight as of this encounter: 126.1 kg (278 lb).   Weight management plan: Patient was referred to their PCP to discuss a diet and exercise plan.        Patient Instructions   Stop antibiotics    Ok to use anti cramping medication.       Return in about 2 weeks (around 1/31/2020) for recheck if not improving.    Madina Summers PA-C  [facility]      "

## 2020-03-10 ENCOUNTER — HEALTH MAINTENANCE LETTER (OUTPATIENT)
Age: 25
End: 2020-03-10

## 2020-03-19 ENCOUNTER — VIRTUAL VISIT (OUTPATIENT)
Dept: FAMILY MEDICINE | Facility: OTHER | Age: 25
End: 2020-03-19

## 2020-03-19 NOTE — PROGRESS NOTES
"Date: 2020 17:22:21  Clinician: Patel Whiteside  Clinician NPI: [id number]  Patient: Misty Disla  Patient : 1995  Patient Address: [address] [place] [address]  Patient Phone: [phone]  Visit Protocol: URI  Patient Summary:  Misty is a 25 year old ( : 1995 ) female who initiated a Visit for COVID-19 (Coronavirus) evaluation and screening. When asked the question \"Please sign me up to receive news, health information and promotions. \", Misty responded \"No\".    Misty states her symptoms started suddenly 2-3 weeks ago.   Her symptoms consist of a cough.   Symptom details   Cough: Misty coughs a few times an hour and her cough is not more bothersome at night. Phlegm comes into her throat when she coughs. She believes her cough is caused by post-nasal drip. The color of the phlegm is yellow, white, and clear.    Misty denies having sore throat, nasal congestion, malaise, headache, fever, ear pain, rhinitis, enlarged lymph nodes, facial pain or pressure, myalgias, chills, teeth pain, and wheezing. She also denies having recent facial or sinus surgery in the past 60 days, double sickening (worsening symptoms after initial improvement), and taking antibiotic medication for the symptoms. She is not experiencing dyspnea.   Precipitating events  She has not recently been exposed to someone with influenza. Misty has been in close contact with the following high risk individuals: people with asthma, heart disease or diabetes, adults 65 or older, and immunocompromised people.   Pertinent COVID-19 (Coronavirus) information  Misty has not traveled internationally or to the areas where COVID-19 (Coronavirus) is widespread, including cruise ship travel in the last 14 days before the start of her symptoms.   Misty has not had a close contact with a laboratory-confirmed COVID-19 patient within 14 days of symptom onset. She has had a close contact with a suspected COVID-19 " patient within 14 days of symptom onset. Additional information about contact with COVID-19 (Coronavirus) patient as reported by the patient (free text): My friend's sister's leni is suspected to have COVID-19. While I did not come in to contact directly with the suspected patient, my friend came in to contact with her sister and then my friend came in to contact with me. Neither of them, to my knowledge, have shown any symptoms of the virus, and outside of a cough that came from a cold over two weeks ago and allergies I don't think I'm showing symptoms, but my boss and his boss are asking me to be screened before returning to work.   Misty is not a healthcare worker and does not work in a healthcare facility.   Pertinent medical history  Misty does not get yeast infections when she takes antibiotics.   Misty needs a return to work/school note.   Weight: 264 lbs   Misty does not smoke or use smokeless tobacco.   She denies pregnancy and denies breastfeeding. Her last period was over a month ago.   Weight: 264 lbs    MEDICATIONS: Advil Liqui-Gel oral, Zyrtec oral, Rhinocort Allergy nasal, Airborne oral, melatonin oral, ALLERGIES: NKDA  Clinician Response:  Dear Misty,  Based on the information provided, you have a viral upper respiratory infection, otherwise known as a cold. Symptoms vary from person to person, but can include sneezing, coughing, a runny nose, sore throat, and headache and range from mild to severe.  Unfortunately, there are no medications that can cure a cold, so treatment is focused on controlling symptoms as much as possible. Most people gradually feel better until symptoms are gone in 1-2 weeks.  Medication information  Because you have a viral infection, antibiotics will not help you get better. Treating a viral infection with antibiotics could actually make you feel worse.  I am prescribing:     Benzonatate (Tessalon Perles) 100 mg oral capsule. Take 1-2 capsules by mouth 3 times  per day as needed for your cough. There are no refills with this prescription.   Self care  The following tips will keep you as comfortable as possible while you recover:     Rest    Drink plenty of water and other liquids    Take a hot shower to loosen congestion    Take a spoonful of honey to reduce your cough     When to seek care  Please be seen in a clinic or urgent care if new symptoms develop, or symptoms become worse.  COVID-19 (Coronavirus) General Information  With the increase in the number of COVID-19 (Coronavirus) cases, we understand you may have some questions. Below is some helpful information on COVID-19 (Coronavirus).  How can I protect myself and others from the COVID-19 (Coronavirus)?  Because there is currently no vaccine to prevent infection, the best way to protect yourself is to avoid being exposed to this virus. Put distance between yourself and other people if COVID-19 (Coronavirus) is spreading in your community. The virus is thought to spread mainly from person-to-person.     Between people who are in close contact with one another (within about 6 about) for a prolonged period (10 minutes or longer).    Through respiratory droplets produced when an infected person coughs or sneezes.     The CDC recommends the following additional steps to protect yourself and others:     Wash your hands often with soap and water for at least 20 seconds, especially after blowing your nose, coughing, or sneezing; going to the bathroom; and before eating or preparing food.  Use an alcohol-based hand  that contains at least 60 percent alcohol if soap and water are not available.        Avoid touching your eyes, nose and mouth with unwashed hands.    Avoid close contact with people who are sick.    Stay home when you are sick.    Cover your cough or sneeze with a tissue, then throw the tissue in the trash.    Clean and disinfect frequently touched objects and surfaces.     You can help stop COVID-19  (Coronavirus) by knowing the signs and symptoms:     Fever    Cough    Shortness of breath     Contact your healthcare provider if   Develop symptoms   AND   Have been in close contact with a person known to have COVID-19 (Coronavirus) or live in or have recently traveled from an area with ongoing spread of COVID-19 (Coronavirus). Call ahead before you go to a doctor's office or emergency room. Tell them about your recent travel and your symptoms.   For the most up to date information, visit the CDC's website.  Self-monitoring  Self-monitoring means people should monitor themselves for fever by taking their temperatures twice a day and remain alert for a cough or difficulty breathing.  It is important to check your health two times each day for 14 days after a potential exposure to a person with COVID-19 (Coronavirus) or after travel from a location where COVID-19 (Coronavirus) is widespread. If you have been exposed to a person with COVID-19 (Coronavirus), it may take up to 14 days to know if you will get sick. Follow the steps below to check and record your health.     Take your temperature with a thermometer twice a day, once in the morning and once in the evening, and watch for a cough or difficulty breathing for 14 days.    Write down your temperature and any COVID-19 symptoms you may have: feeling feverish, coughing, or difficulty breathing.    Stay home from work or school.    Do not take public transportation, taxis, or ride-shares.    Avoid crowded places (such as shopping centers and movie theaters) and limit your activities in public.    Keep your distance from others (about 6 feet or 2 meters).    If you get sick with fever, cough, or trouble breathing, contact your healthcare provider and tell them about your recent travel and/or your symptoms.    If you need to seek medical care for other reasons, such as dialysis, call ahead to your doctor and tell them about your recent travel.     Steps to help  prevent the spread of COVID-19 (Coronavirus) if you are sick  If you are sick with COVID-19 (Coronavirus) or suspect you are infected with the virus that causes COVID-19 (Coronavirus), follow the steps below to help prevent the disease from spreading&nbsp;to people in your home and community.     Stay home except to get medical care. Home isolation may be started in consultation with your healthcare clinician.    Separate yourself from other people and animals in your home.    Call ahead before visiting your doctor if you have a medical appointment.    Wear a facemask when you are around other people.    Cover your cough and sneezes.    Clean your hands often.    Avoid sharing personal household items.    Clean and disinfect frequently touched objects and surfaces everyday.    You will need to have someone drop off medications or household supplies (if needed) at your house without coming inside or in contact with you or others living in your house.    Monitor your symptoms and seek prompt medical care if your illness is worsening (e.g. Difficulty breathing).    Discontinue home isolation only in consultation with your healthcare provider.     For more detailed and up to date information on what to do if you are sick, visit this link: What to Do If You Are Sick With Coronavirus Disease 2019 (COVID-19).  Do I need to be tested for COVID-19 (Coronavirus)?     At this time, the limited number of available tests are controlled by the state and local health departments and are being reserved for more seriously ill patients, those with known exposure to confirmed patients, and those with recent travel (within 14 days) to countries with high rates of COVID-19 (Coronavirus).    Decisions on which patients receive testing will be based on the local spread of COVID-19 (Coronavirus) as well as the symptoms. Your healthcare provider will make the final decision on whether you should be tested.    In the meantime, if you have  "concerns that you may have been exposed, it is reasonable to practice \"social distancing.\"&nbsp; If you are ill with a cold or flu-like illness, please monitor your symptoms and reach out to your healthcare provider if your symptoms worsen.    For more up to date information, visit this link: COVID-19 (Coronavirus) Frequently Asked Questions and Answers.      Diagnosis: Viral URI  Diagnosis ICD: J06.9  Prescription: benzonatate (Tessalon Perles) 100 mg oral capsule 30 capsule, 5 days supply. Take 1-2 capsules by mouth 3 times per day as needed. Refills: 0, Refill as needed: no, Allow substitutions: yes  "

## 2020-07-29 ENCOUNTER — VIRTUAL VISIT (OUTPATIENT)
Dept: FAMILY MEDICINE | Facility: OTHER | Age: 25
End: 2020-07-29

## 2020-07-29 NOTE — PROGRESS NOTES
"Date: 2020 09:10:30  Clinician: Ángel Yang  Clinician NPI: [id number]  Patient: Misty Disla  Patient : 1995  Patient Address: [address] [place] [address]  Patient Phone: [phone]  Visit Protocol: URI  Patient Summary:  Misty is a 25 year old ( : 1995 ) female who initiated a Visit for COVID-19 (Coronavirus) evaluation and screening. When asked the question \"Please sign me up to receive news, health information and promotions. \", Misty responded \"Yes\".    When asked when her symptoms started, Misty reported that she does not have any symptoms.   She denies having recent facial or sinus surgery in the past 60 days.    Pertinent COVID-19 (Coronavirus) information  In the past 14 days, Misty has not worked in a congregate living setting.   She does not work or volunteer as healthcare worker or a  and does not work or volunteer in a healthcare facility.   Misty also has not lived in a congregate living setting in the past 14 days. She does not live with a healthcare worker.   Misty has had a close contact with a laboratory-confirmed COVID-19 patient in the last 14 days. Additional information about contact with COVID-19 (Coronavirus) patient as reported by the patient (free text): One of my co-workers, who has been out of the office for a week, was tested for COVID-19 and it came back positive. 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   Pertinent medical history  Misty has taken an antibiotic medication in the past month. Antibiotic details as reported by the patient (free text): Amoxicillin, for diverticulitis.   Misty does not get yeast infections when she takes antibiotics.   Misty does not need a return to work/school note.   Weight: 260 lbs   Misty does not smoke or use smokeless tobacco.   She denies pregnancy and denies breastfeeding. Her last period was over a month ago.   Additional information as " reported by the patient (free text): Recently diagnosed and treated for diverticulitis, family inherited.   Weight: 260 lbs    MEDICATIONS: Nexplanon subdermal, melatonin oral, Airborne oral, Rhinocort Allergy nasal, Zyrtec oral, Advil Liqui-Gel oral, ALLERGIES: NKDA  Clinician Response:  Dear Misty,   Your symptoms show that you may have coronavirus (COVID-19). This illness can cause fever, cough and trouble breathing. Many people get a mild case and get better on their own. Some people can get very sick.  What should I do?  We would like to test you for this virus.   1. Please call [phone] to schedule your visit. Explain that you were referred by [facility] to have a COVID-19 test. Be ready [facility].  The following will serve as your written order for this COVID Test, ordered by me, for the indication of suspected COVID [Z20.828]: The test will be ordered in [employer], our electronic health record, after you are scheduled. It will show as ordered and authorized by Panfilo Butt MD.  Order: COVID-19 (Coronavirus) PCR for SYMPTOMATIC testing from [facility].      2. When it's time for your COVID test:  Stay at least 6 feet away from others. (If someone will drive you to your test, stay in the backseat, as far away from the  as you can.)   Cover your mouth and nose with a mask, tissue or washcloth.  Go straight to the testing site. Don't make any stops on the way there or back.      3.Starting now: Stay home and away from others (self-isolate) until:   You've had no fever---and no medicine that reduces fever---for 3 full days (72 hours). And...   Your other symptoms have gotten better. For example, your cough or breathing has improved. And...   At least 10 days have passed since your symptoms started.       During this time, don't leave the house except for testing or medical care.   Stay in your own room, even for meals. Use your own bathroom if you can.   Stay away from others in your  "home. No hugging, kissing or shaking hands. No visitors.  Don't go to work, school or anywhere else.    Clean \"high touch\" surfaces often (doorknobs, counters, handles, etc.). Use a household cleaning spray or wipes. You'll find a full list of  on the EPA website: www.epa.gov/pesticide-registration/list-n-disinfectants-use-against-sars-cov-2.   Cover your mouth and nose with a mask, tissue or washcloth to avoid spreading germs.  Wash your hands and face often. Use soap and water.  Caregivers in these groups are at risk for severe illness due to COVID-19:  o People 65 years and older  o People who live in a nursing home or long-term care facility  o People with chronic disease (lung, heart, cancer, diabetes, kidney, liver, immunologic)  o People who have a weakened immune system, including those who:   Are in cancer treatment  Take medicine that weakens the immune system, such as corticosteroids  Had a bone marrow or organ transplant  Have an immune deficiency  Have poorly controlled HIV or AIDS  Are obese (body mass index of 40 or higher)  Smoke regularly   o Caregivers should wear gloves while washing dishes, handling laundry and cleaning bedrooms and bathrooms.  o Use caution when washing and drying laundry: Don't shake dirty laundry, and use the warmest water setting that you can.  o For more tips, go to www.cdc.gov/coronavirus/2019-ncov/downloads/10Things.pdf.    4.Sign up for [employer]. We know it's scary to hear that you might have COVID-19. We want to track your symptoms to make sure you're okay over the next 2 weeks. Please look for an email from [employer]---this is a free, online program that we'll use to keep in touch. To sign up, follow the link in the email. Learn more at [email or web address]  How can I take care of myself?   Get lots of rest. Drink extra fluids (unless a doctor has told you not to).   Take Tylenol (acetaminophen) for fever or pain. If you have liver or kidney " problems, ask your family doctor if it's okay to take Tylenol.   Adults can take either:    650 mg (two 325 mg pills) every 4 to 6 hours, or...   1,000 mg (two 500 mg pills) every 8 hours as needed.    Note: Don't take more than 3,000 mg in one day. Acetaminophen is found in many medicines (both prescribed and over-the-counter medicines). Read all labels to be sure you don't take too much.   For children, check the Tylenol bottle for the right dose. The dose is based on the child's age or weight.    If you have other health problems (like cancer, heart failure, an organ transplant or severe kidney disease): Call your specialty clinic if you don't feel better in the next 2 days.       Know when to call 911. Emergency warning signs include:    Trouble breathing or shortness of breath Pain or pressure in the chest that doesn't go away Feeling confused like you haven't felt before, or not being able to wake up Bluish-colored lips or face.  Where can I get more information?   [facility] -- About COVID-19: [email or web address]   CDC -- What to Do If You're Sick: www.cdc.gov/coronavirus/2019-ncov/about/steps-when-sick.html   CDC -- Ending Home Isolation: www.cdc.gov/coronavirus/2019-ncov/hcp/disposition-in-home-patients.html   [facility] -- Caring for Someone: www.cdc.gov/coronavirus/2019-ncov/if-you-are-sick/care-for-someone.html   [facility] -- Interim Guidance for Hospital Discharge to Home: www.health.[facility].mn.us/diseases/coronavirus/hcp/hospdischarge.pdf   [facility] clinical trials (COVID-19 research studies): clinicalaffairs.[facility]-clinical-trials    Below are the COVID-19 hotlines at the [place] Department of Health ([facility]). Interpreters are available.    For health questions: Call 951-341-9430 or 1-381.549.8534 (7 a.m. to 7 p.m.) For questions about schools and childcare: Call 640-705-4186 or 1-481.363.4655 (7 a.m. to 7 p.m.)    Diagnosis: Contact with and (suspected) exposure to other viral  communicable diseases  Diagnosis ICD: Z20.828

## 2020-09-12 ENCOUNTER — OFFICE VISIT (OUTPATIENT)
Dept: URGENT CARE | Facility: URGENT CARE | Age: 25
End: 2020-09-12
Payer: COMMERCIAL

## 2020-09-12 VITALS
TEMPERATURE: 98.7 F | DIASTOLIC BLOOD PRESSURE: 90 MMHG | OXYGEN SATURATION: 97 % | SYSTOLIC BLOOD PRESSURE: 140 MMHG | RESPIRATION RATE: 16 BRPM | HEART RATE: 102 BPM

## 2020-09-12 DIAGNOSIS — S61.210A LACERATION OF RIGHT INDEX FINGER WITHOUT FOREIGN BODY WITHOUT DAMAGE TO NAIL, INITIAL ENCOUNTER: Primary | ICD-10-CM

## 2020-09-12 PROCEDURE — 12001 RPR S/N/AX/GEN/TRNK 2.5CM/<: CPT | Performed by: PHYSICIAN ASSISTANT

## 2020-09-13 NOTE — PROGRESS NOTES
URGENT CARE VISIT:    SUBJECTIVE:   Misty Disla is a 25 year old female who presents to the clinic with a laceration on the right index finger sustained 3 hour(s) ago.  This is a non-work related injury.  Mechanism of injury: mandalin slicer.    Associated symptoms: Denies numbness, weakness, or loss of function  Last tetanus booster within 10 years: yes    OBJECTIVE:  VITALS: BP (!) 140/90   Pulse 102   Temp 98.7  F (37.1  C) (Temporal)   Resp 16   SpO2 97%   General: WDWN in NAD  Size of laceration: 1 centimeters  Location of laceration: right index finger tip  Characteristics of the laceration: active bleeding, superficial and avulsion of skin  Tendon function intact: yes  Sensation to light touch intact: yes  Pulses intact: yes    ASSESSMENT:    ICD-10-CM    1. Laceration of right index finger without foreign body without damage to nail, initial encounter  S61.210A REPAIR SUPERFICIAL, WOUND BODY < =2.5CM       PLAN:  PROCEDURE NOTE:  Tourniquet applied and finger dipped in lidocaine with epi cup.  Wound cleaned with HIBICLENS  Dermabond applied with good hemostasis.   Wound was dressed with gauze.     Patient Instructions   Patient was educated on the natural course of injury.  Keep wound dry and clean. Wash area with soap and water. Watch for signs of infection. Conservative measures discussed including over-the-counter Tylenol as needed for pain. See your primary care provider in 7 days if no improvement or sooner as needed. Seek emergency care if you develop fever, streaking, severe pain or redness. Patient verbalized understanding and is agreeable to plan. The patient was discharged ambulatory and in stable condition.    Lory Riggs PA-C ....................  9/12/2020   8:45 PM

## 2020-09-13 NOTE — PATIENT INSTRUCTIONS
Patient was educated on the natural course of injury.  Keep wound dry and clean. Dermabond glue will dissolve in about 3 to 5 days. Watch for signs of infection such as purulent drainage or redness. Conservative measures discussed including over-the-counter Tylenol as needed for pain. See your primary care provider in 7 days if no improvement or sooner as needed. Seek emergency care if you develop fever, streaking, severe pain or redness.

## 2020-12-27 ENCOUNTER — HEALTH MAINTENANCE LETTER (OUTPATIENT)
Age: 25
End: 2020-12-27

## 2021-01-07 ENCOUNTER — OFFICE VISIT (OUTPATIENT)
Dept: URGENT CARE | Facility: URGENT CARE | Age: 26
End: 2021-01-07
Payer: COMMERCIAL

## 2021-01-07 ENCOUNTER — ANCILLARY PROCEDURE (OUTPATIENT)
Dept: GENERAL RADIOLOGY | Facility: CLINIC | Age: 26
End: 2021-01-07
Attending: PHYSICIAN ASSISTANT
Payer: COMMERCIAL

## 2021-01-07 VITALS
HEART RATE: 80 BPM | BODY MASS INDEX: 41.47 KG/M2 | HEIGHT: 69 IN | RESPIRATION RATE: 16 BRPM | SYSTOLIC BLOOD PRESSURE: 141 MMHG | TEMPERATURE: 99.1 F | DIASTOLIC BLOOD PRESSURE: 89 MMHG | WEIGHT: 280 LBS | OXYGEN SATURATION: 98 %

## 2021-01-07 DIAGNOSIS — Z83.79 FAMILY HISTORY OF COLONIC DIVERTICULITIS: ICD-10-CM

## 2021-01-07 DIAGNOSIS — K57.32 DIVERTICULITIS OF COLON: ICD-10-CM

## 2021-01-07 DIAGNOSIS — Z87.19 HX OF DIVERTICULITIS OF COLON: ICD-10-CM

## 2021-01-07 DIAGNOSIS — R10.32 LLQ ABDOMINAL PAIN: Primary | ICD-10-CM

## 2021-01-07 LAB
BASOPHILS # BLD AUTO: 0.1 10E9/L (ref 0–0.2)
BASOPHILS NFR BLD AUTO: 0.6 %
DIFFERENTIAL METHOD BLD: NORMAL
EOSINOPHIL # BLD AUTO: 0.3 10E9/L (ref 0–0.7)
EOSINOPHIL NFR BLD AUTO: 3.5 %
ERYTHROCYTE [DISTWIDTH] IN BLOOD BY AUTOMATED COUNT: 14 % (ref 10–15)
HCT VFR BLD AUTO: 41.8 % (ref 35–47)
HGB BLD-MCNC: 13.5 G/DL (ref 11.7–15.7)
LYMPHOCYTES # BLD AUTO: 2.4 10E9/L (ref 0.8–5.3)
LYMPHOCYTES NFR BLD AUTO: 26.9 %
MCH RBC QN AUTO: 28 PG (ref 26.5–33)
MCHC RBC AUTO-ENTMCNC: 32.3 G/DL (ref 31.5–36.5)
MCV RBC AUTO: 87 FL (ref 78–100)
MONOCYTES # BLD AUTO: 0.4 10E9/L (ref 0–1.3)
MONOCYTES NFR BLD AUTO: 4.6 %
NEUTROPHILS # BLD AUTO: 5.7 10E9/L (ref 1.6–8.3)
NEUTROPHILS NFR BLD AUTO: 64.4 %
PLATELET # BLD AUTO: 295 10E9/L (ref 150–450)
RBC # BLD AUTO: 4.82 10E12/L (ref 3.8–5.2)
WBC # BLD AUTO: 8.8 10E9/L (ref 4–11)

## 2021-01-07 PROCEDURE — 36415 COLL VENOUS BLD VENIPUNCTURE: CPT | Performed by: PHYSICIAN ASSISTANT

## 2021-01-07 PROCEDURE — 99214 OFFICE O/P EST MOD 30 MIN: CPT | Performed by: PHYSICIAN ASSISTANT

## 2021-01-07 PROCEDURE — 74019 RADEX ABDOMEN 2 VIEWS: CPT | Performed by: RADIOLOGY

## 2021-01-07 PROCEDURE — 85025 COMPLETE CBC W/AUTO DIFF WBC: CPT | Performed by: PHYSICIAN ASSISTANT

## 2021-01-07 RX ORDER — METRONIDAZOLE 500 MG/1
500 TABLET ORAL 3 TIMES DAILY
Qty: 30 TABLET | Refills: 0 | Status: SHIPPED | OUTPATIENT
Start: 2021-01-07 | End: 2021-01-17

## 2021-01-07 RX ORDER — CIPROFLOXACIN 500 MG/1
500 TABLET, FILM COATED ORAL 2 TIMES DAILY
Qty: 20 TABLET | Refills: 0 | Status: SHIPPED | OUTPATIENT
Start: 2021-01-07 | End: 2021-01-17

## 2021-01-07 ASSESSMENT — MIFFLIN-ST. JEOR: SCORE: 2074.45

## 2021-01-07 NOTE — PATIENT INSTRUCTIONS
Patient Education   Diet for Diverticular Disease  What is diverticular disease?   When the inner wall of your colon weakens and forms small pouches, you have diverticulosis. For most people, this causes no symptoms.   If the pouches become inflamed or infected, you have diverticulitis. Warning signs may include pain in the lower abdomen, chills and vomiting (throwing up).  These conditions are called diverticular disease.  What causes it?  The cause has been linked to many years of eating too little fiber. People who eat plenty of whole grains, fresh fruits and vegetables are less likely to get this disease.   Once the pouches have formed, there is no way to reverse them.   How much fiber should I get?  If you're healing from diverticulitis or surgery to remove the inflamed area, you will at first follow a low-fiber diet (less than 10 grams each day). Then, as your doctor advises, you may slowly add fiber. Increase your intake by 1 to 2 grams a day. Your goal will be 25 to 30 grams a day.   To avoid future problems, continue to get 25 to 30 grams of fiber each day.   How can fiber help?   A high-fiber diet will help you have regular bowel movements. Fiber adds bulk to the stool and helps it pass more easily. Fiber also helps prevent the pouches from growing larger or getting infected.  In the past, doctors have warned patients to avoid eating nuts, seeds and popcorn. They believed these foods could get caught in the pouches and inflame them. But recent studies do not support this idea.   Please ask your dietitian for the handout Fiber Content in [employer]. It will show you how to get more fiber in your diet.  For informational purposes only. Not to replace the advice of your health care provider.   Copyright   2007 [employer]. All rights reserved. [employer] 938098 - REV 09/15.       Patient Education     Discharge Instructions for Diverticulitis   You have been diagnosed with diverticulitis.  This is a condition in which small pouches form in your colon (large intestine) and become inflamed or infected. Follow the guidelines below for home care.  As you recover  Tips for recovery include:    Eat a low-fiber diet at first while you recover. Your healthcare provider may advise a liquid diet. This gives your bowel a chance to rest so that it can recover.    Foods to include: flake cereal, mashed potatoes, pancakes, waffles, pasta, white bread, rice, applesauce, bananas, eggs, fish, poultry, tofu, and well-cooked vegetables    Take your medicines as directed. Don't stop taking the medicines, even if you feel better.    Monitor your temperature and report any rise in temperature to your healthcare provider.    Take antibiotics exactly as directed. Don't miss any and keep taking them even if you feel better.     Drink 6 to 8 glasses of water every day, unless told otherwise.    Use a heating pad or hot water bottle to reduce abdominal cramping or pain.  Preventing diverticulitis in the future  Tips for prevention include:    Eat a high-fiber diet. Fiber adds bulk to the stool so that it passes through the large intestine more easily.    Keep drinking 6 to 8 glasses of water every day, unless told otherwise.    Start an exercise program. Ask your healthcare provider how to get started. You can benefit from simple activities such as walking or gardening.    Treat diarrhea with a bland diet. Start with liquids only, then slowly add fiber over time.    Watch for changes in your bowel movements (constipation to diarrhea).    Prevent constipation with fiber and add a stool softener if needed.     Get plenty of rest and sleep.  Follow-up care  Make a follow-up appointment, or as advised. Your provider may recommend a colonoscopy or other imaging test of your colon.  When to call your healthcare provider  Call your healthcare provider immediately if you have any of the following:    Fever of 100.4 F (38.0 C) or  higher, or as directed by your healthcare provider    Chills    Severe cramps in the belly, most commonly the lower left side    Tenderness in the belly, most commonly the lower left side    Nausea and vomiting    Bleeding from your rectum  [employer] last reviewed this educational content on 6/1/2019 2000-2020 [employer]. [address] [place] [address], [place] [address]. All rights reserved. This information is not intended as a substitute for professional medical care. Always follow your healthcare professional's instructions.

## 2021-01-08 NOTE — PROGRESS NOTES
"  Assessment & Plan   Problem List Items Addressed This Visit     None      Visit Diagnoses     LLQ abdominal pain    -  Primary    Relevant Medications    ciprofloxacin (CIPRO) 500 MG tablet    metroNIDAZOLE (FLAGYL) 500 MG tablet    Other Relevant Orders    CBC with platelets differential (Completed)    XR Abdomen 2 Views (Completed)    Hx of diverticulitis of colon        Family history of colonic diverticulitis        Diverticulitis of colon        Relevant Medications    ciprofloxacin (CIPRO) 500 MG tablet    metroNIDAZOLE (FLAGYL) 500 MG tablet    Other Relevant Orders    CBC with platelets differential (Completed)    XR Abdomen 2 Views (Completed)         Patient has significant hx of diverticulitis, diagnosed with CT scans x2  At this time, CT is not warranted  Xray abdomen and CBC show low grade findings for need for CT to diagnose diverticulits  Patient started on Cipro and flagyl for diverticulitis  Patient states that she is not pregnant   If symptoms persist or worsen, then CT scan will be needed to rule out perforation and/or abscess    Review of prior external note(s) from [facility]  Review of the result(s) of each unique test - CT scans    Independent interpretation of a test performed by another physician/other qualified health care professional (not separately reported) - CBC, CT       BMI:   Estimated body mass index is 41.35 kg/m  as calculated from the following:    Height as of this encounter: 1.753 m (5' 9\").    Weight as of this encounter: 127 kg (280 lb).     CONSULTATION/REFERRAL to GI if symptoms persist or reoccur    Ángel Yang PA-C  [facility]DA Jay is a 25 year old who presents to clinic today for the following health issues     HPI     Patient presents with LLQ abdominal pain for a few days, hx of diverticulitis with family hx of diverticulitis    Review of Systems   Constitutional, HEENT, cardiovascular, pulmonary, gi and gu " "systems are negative, except as otherwise noted.      Objective    BP (!) 141/89 (BP Location: Left arm, Patient Position: Sitting, Cuff Size: Adult Large)   Pulse 80   Temp 99.1  F (37.3  C) (Tympanic)   Resp 16   Ht 1.753 m (5' 9\")   Wt 127 kg (280 lb)   SpO2 98%   BMI 41.35 kg/m    Body mass index is 41.35 kg/m .  Physical Exam   GENERAL: healthy, alert and no distress  NECK: no adenopathy, no asymmetry, masses, or scars and thyroid normal to palpation  RESP: lungs clear to auscultation - no rales, rhonchi or wheezes  BREAST: normal without masses, tenderness or nipple discharge and no palpable axillary masses or adenopathy  CV: regular rate and rhythm, normal S1 S2, no S3 or S4, no murmur, click or rub, no peripheral edema and peripheral pulses strong  ABDOMEN: tenderness LLQ and bowel sounds normal  MS: no gross musculoskeletal defects noted, no edema  SKIN: no suspicious lesions or rashes  NEURO: Normal strength and tone, mentation intact and speech normal    Xray - Reviewed and interpreted by me.    Office Visit on 01/14/2020   Component Date Value Ref Range Status     Color Urine 01/14/2020 Yellow   Final     Appearance Urine 01/14/2020 Clear   Final     Glucose Urine 01/14/2020 Negative  NEG^Negative mg/dL Final     Bilirubin Urine 01/14/2020 Negative  NEG^Negative Final     Ketones Urine 01/14/2020 Negative  NEG^Negative mg/dL Final     Specific Gravity Urine 01/14/2020 1.020  1.003 - 1.035 Final     pH Urine 01/14/2020 5.5  5.0 - 7.0 pH Final     Protein Albumin Urine 01/14/2020 Negative  NEG^Negative mg/dL Final     Urobilinogen Urine 01/14/2020 0.2  0.2 - 1.0 EU/dL Final     Nitrite Urine 01/14/2020 Negative  NEG^Negative Final     Blood Urine 01/14/2020 Small* NEG^Negative Final     Leukocyte Esterase Urine 01/14/2020 Negative  NEG^Negative Final     Source 01/14/2020 Midstream Urine   Final     WBC Urine 01/14/2020 5-10* OTO5^0 - 5 /HPF Final     RBC Urine 01/14/2020 O - 2  OTO2^O - 2 /HPF " Final     Squamous Epithelial /LPF Urine 01/14/2020 Moderate* FEW^Few /LPF Final     Bacteria Urine 01/14/2020 Few* NEG^Negative /HPF Final     Specimen Description 01/14/2020 Vagina   Final     Wet Prep 01/14/2020 No Trichomonas seen   Final     Wet Prep 01/14/2020 No clue cells seen   Final     Wet Prep 01/14/2020 No yeast seen   Final     Wet Prep 01/14/2020    Final                    Value:WBC'S seen  Many       HCG Qual Urine 01/14/2020 Negative  NEG^Negative Final    Comment: This test is for screening purposes.  Results should be interpreted along with   the clinical picture.  Confirmation testing is available if warranted by   ordering [id number], HCG Quantitative Pregnancy.         Results for orders placed or performed in visit on 01/07/21 (from the past 24 hour(s))   CBC with platelets differential   Result Value Ref Range    WBC 8.8 4.0 - 11.0 10e9/L    RBC Count 4.82 3.8 - 5.2 10e12/L    Hemoglobin 13.5 11.7 - 15.7 g/dL    Hematocrit 41.8 35.0 - 47.0 %    MCV 87 78 - 100 fl    MCH 28.0 26.5 - 33.0 pg    MCHC 32.3 31.5 - 36.5 g/dL    RDW 14.0 10.0 - 15.0 %    Platelet Count 295 150 - 450 10e9/L    % Neutrophils 64.4 %    % Lymphocytes 26.9 %    % Monocytes 4.6 %    % Eosinophils 3.5 %    % Basophils 0.6 %    Absolute Neutrophil 5.7 1.6 - 8.3 10e9/L    Absolute Lymphocytes 2.4 0.8 - 5.3 10e9/L    Absolute Monocytes 0.4 0.0 - 1.3 10e9/L    Absolute Eosinophils 0.3 0.0 - 0.7 10e9/L    Absolute Basophils 0.1 0.0 - 0.2 10e9/L    Diff Method Automated Method    XR Abdomen 2 Views    Narrative    ABDOMEN TWO-THREE VIEW  1/7/2021 3:45 PM     HISTORY: Left lower quadrant abdominal pain. Diverticulitis of colon.    COMPARISON: December 11, 2018    FINDINGS: Large amount of stool. No free air. There are no air filled  distended loops of small bowel. The colon is not distended. The lung  bases are unremarkable.      Impression    IMPRESSION: Nonobstructed bowel gas pattern.    BELLA GONZALEZ MD

## 2021-02-01 ENCOUNTER — OFFICE VISIT (OUTPATIENT)
Dept: URGENT CARE | Facility: URGENT CARE | Age: 26
End: 2021-02-01
Payer: COMMERCIAL

## 2021-02-01 VITALS
RESPIRATION RATE: 20 BRPM | HEIGHT: 69 IN | BODY MASS INDEX: 41.47 KG/M2 | HEART RATE: 99 BPM | SYSTOLIC BLOOD PRESSURE: 147 MMHG | OXYGEN SATURATION: 99 % | TEMPERATURE: 98 F | WEIGHT: 280 LBS | DIASTOLIC BLOOD PRESSURE: 106 MMHG

## 2021-02-01 DIAGNOSIS — Z20.822 SUSPECTED COVID-19 VIRUS INFECTION: Primary | ICD-10-CM

## 2021-02-01 DIAGNOSIS — R42 DIZZINESS: ICD-10-CM

## 2021-02-01 DIAGNOSIS — R11.0 NAUSEA: ICD-10-CM

## 2021-02-01 LAB
ALBUMIN SERPL-MCNC: 3.8 G/DL (ref 3.4–5)
ALBUMIN UR-MCNC: NEGATIVE MG/DL
ALP SERPL-CCNC: 127 U/L (ref 40–150)
ALT SERPL W P-5'-P-CCNC: 48 U/L (ref 0–50)
ANION GAP SERPL CALCULATED.3IONS-SCNC: 3 MMOL/L (ref 3–14)
APPEARANCE UR: CLEAR
AST SERPL W P-5'-P-CCNC: 19 U/L (ref 0–45)
BACTERIA #/AREA URNS HPF: ABNORMAL /HPF
BASOPHILS # BLD AUTO: 0.1 10E9/L (ref 0–0.2)
BASOPHILS NFR BLD AUTO: 0.7 %
BILIRUB SERPL-MCNC: 0.5 MG/DL (ref 0.2–1.3)
BILIRUB UR QL STRIP: NEGATIVE
BUN SERPL-MCNC: 11 MG/DL (ref 7–30)
CALCIUM SERPL-MCNC: 9.7 MG/DL (ref 8.5–10.1)
CHLORIDE SERPL-SCNC: 105 MMOL/L (ref 94–109)
CO2 SERPL-SCNC: 29 MMOL/L (ref 20–32)
COLOR UR AUTO: YELLOW
CREAT SERPL-MCNC: 0.71 MG/DL (ref 0.52–1.04)
DIFFERENTIAL METHOD BLD: NORMAL
EOSINOPHIL # BLD AUTO: 0.2 10E9/L (ref 0–0.7)
EOSINOPHIL NFR BLD AUTO: 2.5 %
ERYTHROCYTE [DISTWIDTH] IN BLOOD BY AUTOMATED COUNT: 14.2 % (ref 10–15)
GFR SERPL CREATININE-BSD FRML MDRD: >90 ML/MIN/{1.73_M2}
GLUCOSE SERPL-MCNC: 106 MG/DL (ref 70–99)
GLUCOSE UR STRIP-MCNC: NEGATIVE MG/DL
HCG UR QL: NEGATIVE
HCT VFR BLD AUTO: 45.1 % (ref 35–47)
HGB BLD-MCNC: 14.4 G/DL (ref 11.7–15.7)
HGB UR QL STRIP: ABNORMAL
KETONES UR STRIP-MCNC: NEGATIVE MG/DL
LEUKOCYTE ESTERASE UR QL STRIP: ABNORMAL
LYMPHOCYTES # BLD AUTO: 1.3 10E9/L (ref 0.8–5.3)
LYMPHOCYTES NFR BLD AUTO: 19.6 %
MCH RBC QN AUTO: 28 PG (ref 26.5–33)
MCHC RBC AUTO-ENTMCNC: 31.9 G/DL (ref 31.5–36.5)
MCV RBC AUTO: 88 FL (ref 78–100)
MONOCYTES # BLD AUTO: 0.3 10E9/L (ref 0–1.3)
MONOCYTES NFR BLD AUTO: 4.7 %
NEUTROPHILS # BLD AUTO: 4.9 10E9/L (ref 1.6–8.3)
NEUTROPHILS NFR BLD AUTO: 72.5 %
NITRATE UR QL: NEGATIVE
NON-SQ EPI CELLS #/AREA URNS LPF: ABNORMAL /LPF
PH UR STRIP: 6 PH (ref 5–7)
PLATELET # BLD AUTO: 270 10E9/L (ref 150–450)
POTASSIUM SERPL-SCNC: 4.3 MMOL/L (ref 3.4–5.3)
PROT SERPL-MCNC: 8.6 G/DL (ref 6.8–8.8)
RBC # BLD AUTO: 5.14 10E12/L (ref 3.8–5.2)
RBC #/AREA URNS AUTO: ABNORMAL /HPF
SARS-COV-2 RNA RESP QL NAA+PROBE: NORMAL
SODIUM SERPL-SCNC: 137 MMOL/L (ref 133–144)
SOURCE: ABNORMAL
SP GR UR STRIP: 1.02 (ref 1–1.03)
SPECIMEN SOURCE: NORMAL
TSH SERPL DL<=0.005 MIU/L-ACNC: 1.64 MU/L (ref 0.4–4)
UROBILINOGEN UR STRIP-ACNC: 0.2 EU/DL (ref 0.2–1)
WBC # BLD AUTO: 6.8 10E9/L (ref 4–11)
WBC #/AREA URNS AUTO: ABNORMAL /HPF

## 2021-02-01 PROCEDURE — 87086 URINE CULTURE/COLONY COUNT: CPT | Performed by: PHYSICIAN ASSISTANT

## 2021-02-01 PROCEDURE — 80050 GENERAL HEALTH PANEL: CPT | Performed by: PHYSICIAN ASSISTANT

## 2021-02-01 PROCEDURE — 81001 URINALYSIS AUTO W/SCOPE: CPT | Performed by: PHYSICIAN ASSISTANT

## 2021-02-01 PROCEDURE — 36415 COLL VENOUS BLD VENIPUNCTURE: CPT | Performed by: PHYSICIAN ASSISTANT

## 2021-02-01 PROCEDURE — 81025 URINE PREGNANCY TEST: CPT | Performed by: PHYSICIAN ASSISTANT

## 2021-02-01 PROCEDURE — 99214 OFFICE O/P EST MOD 30 MIN: CPT | Performed by: PHYSICIAN ASSISTANT

## 2021-02-01 PROCEDURE — U0005 INFEC AGEN DETEC AMPLI PROBE: HCPCS | Performed by: FAMILY MEDICINE

## 2021-02-01 PROCEDURE — U0003 INFECTIOUS AGENT DETECTION BY NUCLEIC ACID (DNA OR RNA); SEVERE ACUTE RESPIRATORY SYNDROME CORONAVIRUS 2 (SARS-COV-2) (CORONAVIRUS DISEASE [COVID-19]), AMPLIFIED PROBE TECHNIQUE, MAKING USE OF HIGH THROUGHPUT TECHNOLOGIES AS DESCRIBED BY CMS-2020-01-R: HCPCS | Performed by: FAMILY MEDICINE

## 2021-02-01 RX ORDER — MECLIZINE HYDROCHLORIDE 25 MG/1
25 TABLET ORAL 3 TIMES DAILY PRN
Qty: 15 TABLET | Refills: 0 | Status: SHIPPED | OUTPATIENT
Start: 2021-02-01 | End: 2021-04-05

## 2021-02-01 RX ORDER — ONDANSETRON 4 MG/1
4 TABLET, ORALLY DISINTEGRATING ORAL EVERY 8 HOURS PRN
Qty: 12 TABLET | Refills: 0 | Status: SHIPPED | OUTPATIENT
Start: 2021-02-01 | End: 2021-04-05

## 2021-02-01 ASSESSMENT — MIFFLIN-ST. JEOR: SCORE: 2074.45

## 2021-02-01 NOTE — PROGRESS NOTES
"  Assessment & Plan     Suspected COVID-19 virus infection  Covid testing done today due to nausea  No exposure that she knows of  - Symptomatic COVID-19 Virus (Coronavirus) by PCR    Dizziness  Onset of symptoms a few weeks ago  CBC neg for anemia  CMP neg for diabetes  TSH neg for thyroid disease  UA neg for pregnancy  Started on Meclizine for dizziness, vertigo like  - CBC with platelets differential  - Comprehensive metabolic panel  - TSH with free T4 reflex  - UA with Microscopic reflex to Culture  - meclizine (ANTIVERT) 25 MG tablet; Take 1 tablet (25 mg) by mouth 3 times daily as needed for dizziness    Nausea  Urine HCG neg for pregnancy  Zofran for nausea  - HCG Qual, Urine ([id number])  - ondansetron (ZOFRAN ODT) 4 MG ODT tab; Take 1 tablet (4 mg) by mouth every 8 hours as needed for nausea  - Urine Culture Aerobic Bacterial    Review of the result(s) of each unique test - CBC, CMP, TSH  Independent interpretation of a test performed by another physician/other qualified health care professional (not separately reported) - Prior visits for dizziness                 BMI:   Estimated body mass index is 41.35 kg/m  as calculated from the following:    Height as of this encounter: 1.753 m (5' 9\").    Weight as of this encounter: 127 kg (280 lb).   Weight management plan: Patient was referred to their PCP to discuss a diet and exercise plan.      FURTHER TESTING:       - MRI - if symptoms persist or worsen      Ángel Yang PA-C  [facility] Jay is a 25 year old who presents to clinic today for the following health issues     HPI     Patient has dizziness, nausea  Few weeks intermittent    Review of Systems   Constitutional, HEENT, cardiovascular, pulmonary, gi and gu systems are negative, except as otherwise noted.      Objective    BP (!) 147/106 (BP Location: Left arm)   Pulse 99   Temp 98  F (36.7  C) (Tympanic)   Resp 20   Ht 1.753 m (5' 9\")   Wt 127 kg " (280 lb)   SpO2 99%   BMI 41.35 kg/m    Body mass index is 41.35 kg/m .  Physical Exam   GENERAL: healthy, alert and no distress  EYES: Eyes grossly normal to inspection, PERRL and conjunctivae and sclerae normal  HENT: ear canals and TM's normal, nose and mouth without ulcers or lesions  NECK: no adenopathy, no asymmetry, masses, or scars and thyroid normal to palpation  RESP: lungs clear to auscultation - no rales, rhonchi or wheezes  BREAST: normal without masses, tenderness or nipple discharge and no palpable axillary masses or adenopathy  CV: regular rate and rhythm, normal S1 S2, no S3 or S4, no murmur, click or rub, no peripheral edema and peripheral pulses strong  ABDOMEN: soft, nontender, no hepatosplenomegaly, no masses and bowel sounds normal  MS: no gross musculoskeletal defects noted, no edema  SKIN: no suspicious lesions or rashes  NEURO: Normal strength and tone, mentation intact and speech normal  PSYCH: mentation appears normal, affect normal/bright    Results for orders placed or performed in visit on 02/01/21 (from the past 24 hour(s))   CBC with platelets differential   Result Value Ref Range    WBC 6.8 4.0 - 11.0 10e9/L    RBC Count 5.14 3.8 - 5.2 10e12/L    Hemoglobin 14.4 11.7 - 15.7 g/dL    Hematocrit 45.1 35.0 - 47.0 %    MCV 88 78 - 100 fl    MCH 28.0 26.5 - 33.0 pg    MCHC 31.9 31.5 - 36.5 g/dL    RDW 14.2 10.0 - 15.0 %    Platelet Count 270 150 - 450 10e9/L    % Neutrophils 72.5 %    % Lymphocytes 19.6 %    % Monocytes 4.7 %    % Eosinophils 2.5 %    % Basophils 0.7 %    Absolute Neutrophil 4.9 1.6 - 8.3 10e9/L    Absolute Lymphocytes 1.3 0.8 - 5.3 10e9/L    Absolute Monocytes 0.3 0.0 - 1.3 10e9/L    Absolute Eosinophils 0.2 0.0 - 0.7 10e9/L    Absolute Basophils 0.1 0.0 - 0.2 10e9/L    Diff Method Automated Method    Comprehensive metabolic panel   Result Value Ref Range    Sodium 137 133 - 144 mmol/L    Potassium 4.3 3.4 - 5.3 mmol/L    Chloride 105 94 - 109 mmol/L    Carbon Dioxide  29 20 - 32 mmol/L    Anion Gap 3 3 - 14 mmol/L    Glucose 106 (H) 70 - 99 mg/dL    Urea Nitrogen 11 7 - 30 mg/dL    Creatinine 0.71 0.52 - 1.04 mg/dL    GFR Estimate >90 >60 mL/min/[1.73_m2]    GFR Estimate If Black >90 >60 mL/min/[1.73_m2]    Calcium 9.7 8.5 - 10.1 mg/dL    Bilirubin Total 0.5 0.2 - 1.3 mg/dL    Albumin 3.8 3.4 - 5.0 g/dL    Protein Total 8.6 6.8 - 8.8 g/dL    Alkaline Phosphatase 127 40 - 150 U/L    ALT 48 0 - 50 U/L    AST 19 0 - 45 U/L   TSH with free T4 reflex   Result Value Ref Range    TSH 1.64 0.40 - 4.00 mU/L   HCG Qual, Urine ([id number])   Result Value Ref Range    HCG Qual Urine Negative NEG^Negative   UA with Microscopic reflex to Culture    Specimen: Midstream Urine   Result Value Ref Range    Color Urine Yellow     Appearance Urine Clear     Glucose Urine Negative NEG^Negative mg/dL    Bilirubin Urine Negative NEG^Negative    Ketones Urine Negative NEG^Negative mg/dL    Specific Gravity Urine 1.025 1.003 - 1.035    pH Urine 6.0 5.0 - 7.0 pH    Protein Albumin Urine Negative NEG^Negative mg/dL    Urobilinogen Urine 0.2 0.2 - 1.0 EU/dL    Nitrite Urine Negative NEG^Negative    Blood Urine Trace (A) NEG^Negative    Leukocyte Esterase Urine Small (A) NEG^Negative    Source Midstream Urine     WBC Urine 0 - 5 OTO5^0 - 5 /HPF    RBC Urine O - 2 OTO2^O - 2 /HPF    Squamous Epithelial /LPF Urine Many (A) FEW^Few /LPF    Bacteria Urine Few (A) NEG^Negative /HPF

## 2021-02-02 LAB
BACTERIA SPEC CULT: NORMAL
LABORATORY COMMENT REPORT: NORMAL
Lab: NORMAL
SARS-COV-2 RNA RESP QL NAA+PROBE: NEGATIVE
SPECIMEN SOURCE: NORMAL
SPECIMEN SOURCE: NORMAL

## 2021-02-26 ENCOUNTER — OFFICE VISIT (OUTPATIENT)
Dept: URGENT CARE | Facility: URGENT CARE | Age: 26
End: 2021-02-26
Payer: COMMERCIAL

## 2021-02-26 VITALS
HEIGHT: 69 IN | TEMPERATURE: 98.6 F | OXYGEN SATURATION: 100 % | BODY MASS INDEX: 41.47 KG/M2 | SYSTOLIC BLOOD PRESSURE: 126 MMHG | HEART RATE: 60 BPM | DIASTOLIC BLOOD PRESSURE: 84 MMHG | WEIGHT: 280 LBS

## 2021-02-26 DIAGNOSIS — R20.2 PARESTHESIA OF FINGER: Primary | ICD-10-CM

## 2021-02-26 PROCEDURE — 99213 OFFICE O/P EST LOW 20 MIN: CPT | Performed by: FAMILY MEDICINE

## 2021-02-26 RX ORDER — NAPROXEN 500 MG/1
500 TABLET ORAL 2 TIMES DAILY WITH MEALS
Qty: 14 TABLET | Refills: 0 | Status: SHIPPED | OUTPATIENT
Start: 2021-02-26 | End: 2021-03-05

## 2021-02-26 ASSESSMENT — MIFFLIN-ST. JEOR: SCORE: 2069.45

## 2021-02-26 NOTE — LETTER
[facility] [place]  [address] [address]  [phone]      February 26, 2021    RE:  Misty Disla                                                                                                                                                       [address]   [facility] [address]            To whom it may concern:    Misty Disla is under my professional care for Medical.   She  may return to work with the following: No working or lifting restrictions on or about today.          Sincerely,        Suresh Kennedy DO    [facility]

## 2021-02-26 NOTE — PROGRESS NOTES
"SUBJECTIVE: Misty Disla is a 26 year old female presenting with a chief complaint of rt pinky finger numbness.  Onset of symptoms was 3 day(s) ago.  Course of illness is waxing and waning.    Current and Associated symptoms: none  Treatment measures tried include None tried.  Predisposing factors include None.    History reviewed. No pertinent past medical history.  Allergies   Allergen Reactions     Dust Mites      Pollen Extract      Social History     Tobacco Use     Smoking status: Never Smoker     Smokeless tobacco: Never Used   Substance Use Topics     Alcohol use: Not on file       ROS:  SKIN: no rash  GI: no vomiting    OBJECTIVE:  /84   Pulse 60   Temp 98.6  F (37  C) (Tympanic)   Ht 1.753 m (5' 9\")   Wt 127 kg (280 lb)   LMP  (LMP Unknown)   SpO2 100%   Breastfeeding No   BMI 41.35 kg/m  GENERAL APPEARANCE: healthy, alert and no distress  NEURO: Normal strength and tone, sensory exam grossly normal,  normal speech and mentation  SKIN: no suspicious lesions or rashes  Rt hand normal, form, no wrist elbow pain.      ICD-10-CM    1. Paresthesia of finger  R20.2 naproxen (NAPROSYN) 500 MG tablet     Wrist/Arm/Hand Supplies Order for DME - ONLY FOR DME     ice  Fluids/Rest, f/u if worse/not any better    "

## 2021-03-16 ENCOUNTER — ANCILLARY PROCEDURE (OUTPATIENT)
Dept: GENERAL RADIOLOGY | Facility: CLINIC | Age: 26
End: 2021-03-16
Attending: PHYSICIAN ASSISTANT
Payer: COMMERCIAL

## 2021-03-16 ENCOUNTER — OFFICE VISIT (OUTPATIENT)
Dept: URGENT CARE | Facility: URGENT CARE | Age: 26
End: 2021-03-16
Payer: COMMERCIAL

## 2021-03-16 VITALS
WEIGHT: 278 LBS | DIASTOLIC BLOOD PRESSURE: 97 MMHG | TEMPERATURE: 98.4 F | BODY MASS INDEX: 41.05 KG/M2 | OXYGEN SATURATION: 98 % | SYSTOLIC BLOOD PRESSURE: 145 MMHG | HEART RATE: 94 BPM | RESPIRATION RATE: 20 BRPM

## 2021-03-16 DIAGNOSIS — R10.32 LEFT LOWER QUADRANT PAIN: ICD-10-CM

## 2021-03-16 DIAGNOSIS — R10.84 ABDOMINAL PAIN, GENERALIZED: ICD-10-CM

## 2021-03-16 DIAGNOSIS — K57.32 DIVERTICULITIS OF COLON: Primary | ICD-10-CM

## 2021-03-16 LAB
ALBUMIN SERPL-MCNC: 3.6 G/DL (ref 3.4–5)
ALBUMIN UR-MCNC: NEGATIVE MG/DL
ALP SERPL-CCNC: 127 U/L (ref 40–150)
ALT SERPL W P-5'-P-CCNC: 51 U/L (ref 0–50)
ANION GAP SERPL CALCULATED.3IONS-SCNC: 1 MMOL/L (ref 3–14)
APPEARANCE UR: CLEAR
AST SERPL W P-5'-P-CCNC: 28 U/L (ref 0–45)
BASOPHILS # BLD AUTO: 0 10E9/L (ref 0–0.2)
BASOPHILS NFR BLD AUTO: 0.3 %
BILIRUB SERPL-MCNC: 0.7 MG/DL (ref 0.2–1.3)
BILIRUB UR QL STRIP: NEGATIVE
BUN SERPL-MCNC: 6 MG/DL (ref 7–30)
CALCIUM SERPL-MCNC: 9.2 MG/DL (ref 8.5–10.1)
CHLORIDE SERPL-SCNC: 107 MMOL/L (ref 94–109)
CO2 SERPL-SCNC: 29 MMOL/L (ref 20–32)
COLOR UR AUTO: YELLOW
CREAT SERPL-MCNC: 0.64 MG/DL (ref 0.52–1.04)
DIFFERENTIAL METHOD BLD: NORMAL
EOSINOPHIL # BLD AUTO: 0.2 10E9/L (ref 0–0.7)
EOSINOPHIL NFR BLD AUTO: 1.9 %
ERYTHROCYTE [DISTWIDTH] IN BLOOD BY AUTOMATED COUNT: 13.8 % (ref 10–15)
GFR SERPL CREATININE-BSD FRML MDRD: >90 ML/MIN/{1.73_M2}
GLUCOSE SERPL-MCNC: 97 MG/DL (ref 70–99)
GLUCOSE UR STRIP-MCNC: NEGATIVE MG/DL
HCT VFR BLD AUTO: 43.5 % (ref 35–47)
HGB BLD-MCNC: 14.1 G/DL (ref 11.7–15.7)
HGB UR QL STRIP: ABNORMAL
KETONES UR STRIP-MCNC: NEGATIVE MG/DL
LEUKOCYTE ESTERASE UR QL STRIP: NEGATIVE
LYMPHOCYTES # BLD AUTO: 1.4 10E9/L (ref 0.8–5.3)
LYMPHOCYTES NFR BLD AUTO: 13.6 %
MCH RBC QN AUTO: 27.9 PG (ref 26.5–33)
MCHC RBC AUTO-ENTMCNC: 32.4 G/DL (ref 31.5–36.5)
MCV RBC AUTO: 86 FL (ref 78–100)
MONOCYTES # BLD AUTO: 0.6 10E9/L (ref 0–1.3)
MONOCYTES NFR BLD AUTO: 5.8 %
NEUTROPHILS # BLD AUTO: 8 10E9/L (ref 1.6–8.3)
NEUTROPHILS NFR BLD AUTO: 78.4 %
NITRATE UR QL: NEGATIVE
NON-SQ EPI CELLS #/AREA URNS LPF: NORMAL /LPF
PH UR STRIP: 6 PH (ref 5–7)
PLATELET # BLD AUTO: 310 10E9/L (ref 150–450)
POTASSIUM SERPL-SCNC: 3.7 MMOL/L (ref 3.4–5.3)
PROT SERPL-MCNC: 8.4 G/DL (ref 6.8–8.8)
RBC # BLD AUTO: 5.06 10E12/L (ref 3.8–5.2)
RBC #/AREA URNS AUTO: NORMAL /HPF
SODIUM SERPL-SCNC: 137 MMOL/L (ref 133–144)
SOURCE: ABNORMAL
SP GR UR STRIP: 1.02 (ref 1–1.03)
UROBILINOGEN UR STRIP-ACNC: 0.2 EU/DL (ref 0.2–1)
WBC # BLD AUTO: 10.2 10E9/L (ref 4–11)
WBC #/AREA URNS AUTO: NORMAL /HPF

## 2021-03-16 PROCEDURE — 85025 COMPLETE CBC W/AUTO DIFF WBC: CPT | Performed by: PHYSICIAN ASSISTANT

## 2021-03-16 PROCEDURE — 36415 COLL VENOUS BLD VENIPUNCTURE: CPT | Performed by: PHYSICIAN ASSISTANT

## 2021-03-16 PROCEDURE — 80053 COMPREHEN METABOLIC PANEL: CPT | Performed by: PHYSICIAN ASSISTANT

## 2021-03-16 PROCEDURE — 99214 OFFICE O/P EST MOD 30 MIN: CPT | Performed by: PHYSICIAN ASSISTANT

## 2021-03-16 PROCEDURE — 74019 RADEX ABDOMEN 2 VIEWS: CPT | Performed by: RADIOLOGY

## 2021-03-16 PROCEDURE — 81001 URINALYSIS AUTO W/SCOPE: CPT | Performed by: PHYSICIAN ASSISTANT

## 2021-03-16 NOTE — LETTER
[facility] [place]  [address] [address]  [phone]      March 16, 2021    RE:  Misty Disla                                                                                                                                                       [address]   [facility] [address]            To whom it may concern:    Misty Disla was seen in clinic today.  She may return to work on Thursday 3/18/21.        Sincerely,        Christine Pennington PA-C    [facility]

## 2021-03-16 NOTE — PATIENT INSTRUCTIONS
Patient Education     Discharge Instructions for Diverticulitis   You have been diagnosed with diverticulitis. This is a condition in which small pouches form in your colon (large intestine) and become inflamed or infected. Follow the guidelines below for home care.  As you recover  Tips for recovery include:    Eat a low-fiber diet at first while you recover. Your healthcare provider may advise a liquid diet. This gives your bowel a chance to rest so that it can recover.    Foods to include: flake cereal, mashed potatoes, pancakes, waffles, pasta, white bread, rice, applesauce, bananas, eggs, fish, poultry, tofu, and well-cooked vegetables    Take your medicines as directed. Don't stop taking the medicines, even if you feel better.    Monitor your temperature and report any rise in temperature to your healthcare provider.    Take antibiotics exactly as directed. Don't miss any and keep taking them even if you feel better.     Drink 6 to 8 glasses of water every day, unless told otherwise.    Use a heating pad or hot water bottle to reduce abdominal cramping or pain.  Preventing diverticulitis in the future  Tips for prevention include:    Eat a high-fiber diet. Fiber adds bulk to the stool so that it passes through the large intestine more easily.    Keep drinking 6 to 8 glasses of water every day, unless told otherwise.    Start an exercise program. Ask your healthcare provider how to get started. You can benefit from simple activities such as walking or gardening.    Treat diarrhea with a bland diet. Start with liquids only, then slowly add fiber over time.    Watch for changes in your bowel movements (constipation to diarrhea).    Prevent constipation with fiber and add a stool softener if needed.     Get plenty of rest and sleep.  Follow-up care  Make a follow-up appointment, or as advised. Your provider may recommend a colonoscopy or other imaging test of your colon.  When to call your healthcare  provider  Call your healthcare provider immediately if you have any of the following:    Fever of 100.4 F (38.0 C) or higher, or as directed by your healthcare provider    Chills    Severe cramps in the belly, most commonly the lower left side    Tenderness in the belly, most commonly the lower left side    Nausea and vomiting    Bleeding from your rectum  [employer] last reviewed this educational content on 6/1/2019 2000-2020 [employer]. All rights reserved. This information is not intended as a substitute for professional medical care. Always follow your healthcare professional's instructions.

## 2021-03-16 NOTE — PROGRESS NOTES
Patient presents with:  Abdominal Pain: lower -all thru-out for 1 1/2 weeks, and occ nausea. Hx of Diverticulitis(last year)    (K57.32) Diverticulitis of colon  (primary encounter diagnosis)  Comment:   Plan: amoxicillin-clavulanate (AUGMENTIN) 875-125 MG         tablet        See handout on diverticulitis.  Follow up with PCP or GI to discuss recurrent symptoms.  TO ED should symptoms persist or worsen.      (R10.84) Abdominal pain, generalized  Comment:   Plan: *UA reflex to Microscopic and Culture ([facility] (except [place] and         [place]), XR Abdomen 2 Views, Urine Microscopic            (R10.32) Left lower quadrant pain  Comment:   Plan: CBC with platelets and differential,         Comprehensive metabolic panel (BMP + Alb, Alk         Phos, ALT, AST, Total. Bili, TP),         amoxicillin-clavulanate (AUGMENTIN) 875-125 MG         tablet            45 minutes spent on the date of the encounter doing chart review, review of test results, interpretation of tests, patient visit, documentation and discussion with other provider(s) .         SUBJECTIVE:   Misty Disla is a 26 year old female who presents today with intermittent lower left abdominal pain for the past 10 days.  Pain seems to be worse after eating fast food.  Does not recall eating any popcorn or nuts.  She has been taking advil for the pain without relief.      No diarrhea or change in stools.  Some nausea today, no vomiting.      Feels like when she had diverticulitis in July 2020.    Has a PCP but has not seen GI.      Pain is currently a 5/10, worse with pressing on area.      She is on implanted contraceptive, , no risk of pregnancy.  LMP over a year ago    Current Outpatient Medications   Medication Sig Dispense Refill     Multiple Vitamins-Iron (DAILY-ELIGIO/IRON/BETA-CAROTENE) TABS TAKE 1 TABLET BY MOUTH DAILY. (Patient not taking: Reported on 10/19/2020) 30 tablet 7     Social History     Tobacco  Use     Smoking status: Never Smoker     Smokeless tobacco: Never Used   Substance Use Topics     Alcohol use: Not on file     Family History   Problem Relation Age of Onset     Diabetes Mother      Diabetes Father          ROS:    10 point ROS of systems including Constitutional, Eyes, Respiratory, Cardiovascular, Gastroenterology, Genitourinary, Integumentary, Muscularskeletal, Psychiatric ,neurological were all negative except for pertinent positives noted in my HPI       OBJECTIVE:  BP (!) 145/97   Pulse 94   Temp 98.4  F (36.9  C)   Resp 20   Wt 126.1 kg (278 lb)   LMP  (LMP Unknown)   SpO2 98%   BMI 41.05 kg/m    Physical Exam:  GENERAL APPEARANCE: healthy, alert and no distress  EYES: EOMI,  PERRL, conjunctiva clear  HENT: ear canals and TM's normal.  Nose and mouth without ulcers, erythema or lesions  NECK: supple, nontender, no lymphadenopathy  RESP: lungs clear to auscultation - no rales, rhonchi or wheezes  CV: regular rates and rhythm, normal S1 S2, no murmur noted  ABDOMEN:  soft, nontender, no HSM or masses and bowel sounds normal  ABDOMEN: tenderness left lower quadrant without rebound.    NEURO: Normal strength and tone, sensory exam grossly normal,  normal speech and mentation  SKIN: no suspicious lesions or rashes      Previous CT scan from July 2020:  Procedure Note   Tanner, Rad Results In - 07/15/2020 11:05 AM CDT    IMPRESSION  COMPARISON: None.    TECHNIQUE: Images were obtained through the abdomen and pelvis following the administration of oral and 100 mL IOPAMIDOL 61 % IV SOLN contrast.    FINDINGS:    LUNG BASES: Diminutive hiatal hernia without evidence of complication    LIVER: Unremarkable.    GALLBLADDER AND BILIARY TREE: Unremarkable. No intrahepatic or extrahepatic biliary ductal dilation.     PANCREAS: Unremarkable.    SPLEEN: Unremarkable.    ADRENALS: Unremarkable.    KIDNEYS AND URETERS: No hydronephrosis bilaterally. 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 Otherwise, the bilateral ureters are unremarkable. No urinary tract calculi bilaterally.    VESSELS: No abdominal aortic aneurysm.    BOWEL: The stomach and small bowel are decompressed. No small bowel bowel obstruction. The terminal ileum and appendix are not inflamed. Diffuse colonic diverticulosis. Mild-moderate inflammatory changes are centered on a focally-inflamed diverticulum at the junction of the distal descending colon and the proximal sigmoid colon, flanked by submucosal edema, pericolonic fat stranding, and mild edematous thickening of the lateral conal fascia. A trace amount of non-drainable fluid is identified within the left paracolic gutter. No drainable pericolonic fluid collection or abscess identified.    BLADDER: Unremarkable.    REPRODUCTIVE ORGANS: Trace physiologic free fluid within the left adnexa (series 2, image 80). No intrapelvic mass identified.    MESENTERY/PERITONEUM: No pneumatosis, portal venous gas, or large-volume intra-abdominal free air. Minimally prominent, though nonenlarged, left lower quadrant lymph nodes are identified, reactive in nature. Specifically, no intra-abdominal or intra-pelvic lymphadenopathy by CT size criteria.    RETROPERITONEUM: No adenopathy.     ABDOMINAL WALL/SOFT TISSUES: Unremarkable.    BONES: No acute fracture. A few scattered pelvic bone are incidentally noted.    IMPRESSION:     1. Acute uncomplicated colonic diverticulitis.       Results for orders placed or performed in visit on 03/16/21   XR Abdomen 2 Views     Status: None    Narrative    XR ABDOMEN 2 VW   3/16/2021 11:02 AM     HISTORY: patient complains of left lower and lower abdominal pain for  the past 10 days, no h/o change in stool.  Does have h/o  diverticulitis 7/20; Abdominal pain, generalized    COMPARISON: 1/7/2021      Impression    IMPRESSION: No free air. Nonobstructive bowel gas pattern. Small  amount of formed stool in the colon. Pelvic phleboliths. Visualized  lung bases  are clear.    ELDA FONTANA MD   *UA reflex to Microscopic and Culture ([place] [facility] (except [place] and [place])     Status: Abnormal    Specimen: Midstream Urine   Result Value Ref Range    Color Urine Yellow     Appearance Urine Clear     Glucose Urine Negative NEG^Negative mg/dL    Bilirubin Urine Negative NEG^Negative    Ketones Urine Negative NEG^Negative mg/dL    Specific Gravity Urine 1.025 1.003 - 1.035    Blood Urine Trace (A) NEG^Negative    pH Urine 6.0 5.0 - 7.0 pH    Protein Albumin Urine Negative NEG^Negative mg/dL    Urobilinogen Urine 0.2 0.2 - 1.0 EU/dL    Nitrite Urine Negative NEG^Negative    Leukocyte Esterase Urine Negative NEG^Negative    Source Midstream Urine    CBC with platelets and differential     Status: None   Result Value Ref Range    WBC 10.2 4.0 - 11.0 10e9/L    RBC Count 5.06 3.8 - 5.2 10e12/L    Hemoglobin 14.1 11.7 - 15.7 g/dL    Hematocrit 43.5 35.0 - 47.0 %    MCV 86 78 - 100 fl    MCH 27.9 26.5 - 33.0 pg    MCHC 32.4 31.5 - 36.5 g/dL    RDW 13.8 10.0 - 15.0 %    Platelet Count 310 150 - 450 10e9/L    % Neutrophils 78.4 %    % Lymphocytes 13.6 %    % Monocytes 5.8 %    % Eosinophils 1.9 %    % Basophils 0.3 %    Absolute Neutrophil 8.0 1.6 - 8.3 10e9/L    Absolute Lymphocytes 1.4 0.8 - 5.3 10e9/L    Absolute Monocytes 0.6 0.0 - 1.3 10e9/L    Absolute Eosinophils 0.2 0.0 - 0.7 10e9/L    Absolute Basophils 0.0 0.0 - 0.2 10e9/L    Diff Method Automated Method    Comprehensive metabolic panel (BMP + Alb, Alk Phos, ALT, AST, Total. Bili, TP)     Status: Abnormal   Result Value Ref Range    Sodium 137 133 - 144 mmol/L    Potassium 3.7 3.4 - 5.3 mmol/L    Chloride 107 94 - 109 mmol/L    Carbon Dioxide 29 20 - 32 mmol/L    Anion Gap 1 (L) 3 - 14 mmol/L    Glucose 97 70 - 99 mg/dL    Urea Nitrogen 6 (L) 7 - 30 mg/dL    Creatinine 0.64 0.52 - 1.04 mg/dL    GFR Estimate >90 >60 mL/min/[1.73_m2]    GFR Estimate If Black >90 >60 mL/min/[1.73_m2]    Calcium 9.2 8.5 - 10.1  mg/dL    Bilirubin Total 0.7 0.2 - 1.3 mg/dL    Albumin 3.6 3.4 - 5.0 g/dL    Protein Total 8.4 6.8 - 8.8 g/dL    Alkaline Phosphatase 127 40 - 150 U/L    ALT 51 (H) 0 - 50 U/L    AST 28 0 - 45 U/L   Urine Microscopic     Status: None   Result Value Ref Range    WBC Urine 0 - 5 OTO5^0 - 5 /HPF    RBC Urine O - 2 OTO2^O - 2 /HPF    Squamous Epithelial /LPF Urine Few FEW^Few /LPF

## 2021-03-29 ENCOUNTER — TELEPHONE (OUTPATIENT)
Dept: INTERNAL MEDICINE | Facility: CLINIC | Age: 26
End: 2021-03-29

## 2021-03-29 NOTE — TELEPHONE ENCOUNTER
Sent patient a my chart message to reschedule to another provider as this visit is considered a chronic problem.

## 2021-04-04 ASSESSMENT — PATIENT HEALTH QUESTIONNAIRE - PHQ9
SUM OF ALL RESPONSES TO PHQ QUESTIONS 1-9: 18
SUM OF ALL RESPONSES TO PHQ QUESTIONS 1-9: 18
10. IF YOU CHECKED OFF ANY PROBLEMS, HOW DIFFICULT HAVE THESE PROBLEMS MADE IT FOR YOU TO DO YOUR WORK, TAKE CARE OF THINGS AT HOME, OR GET ALONG WITH OTHER PEOPLE: VERY DIFFICULT

## 2021-04-05 ENCOUNTER — OFFICE VISIT (OUTPATIENT)
Dept: INTERNAL MEDICINE | Facility: CLINIC | Age: 26
End: 2021-04-05
Payer: COMMERCIAL

## 2021-04-05 VITALS
WEIGHT: 282 LBS | SYSTOLIC BLOOD PRESSURE: 110 MMHG | HEART RATE: 100 BPM | BODY MASS INDEX: 41.77 KG/M2 | HEIGHT: 69 IN | DIASTOLIC BLOOD PRESSURE: 92 MMHG | OXYGEN SATURATION: 98 % | TEMPERATURE: 97.9 F | RESPIRATION RATE: 18 BRPM

## 2021-04-05 DIAGNOSIS — K57.32 DIVERTICULITIS OF COLON: ICD-10-CM

## 2021-04-05 DIAGNOSIS — F41.1 GAD (GENERALIZED ANXIETY DISORDER): ICD-10-CM

## 2021-04-05 DIAGNOSIS — Z13.1 SCREENING FOR DIABETES MELLITUS: ICD-10-CM

## 2021-04-05 DIAGNOSIS — Z13.220 SCREENING FOR CHOLESTEROL LEVEL: ICD-10-CM

## 2021-04-05 DIAGNOSIS — R03.0 ELEVATED BLOOD PRESSURE READING WITHOUT DIAGNOSIS OF HYPERTENSION: ICD-10-CM

## 2021-04-05 DIAGNOSIS — Z00.00 ROUTINE HISTORY AND PHYSICAL EXAMINATION OF ADULT: Primary | ICD-10-CM

## 2021-04-05 DIAGNOSIS — F34.1 PERSISTENT DEPRESSIVE DISORDER: ICD-10-CM

## 2021-04-05 DIAGNOSIS — Z12.4 SCREENING FOR CERVICAL CANCER: ICD-10-CM

## 2021-04-05 PROCEDURE — 99395 PREV VISIT EST AGE 18-39: CPT | Performed by: INTERNAL MEDICINE

## 2021-04-05 PROCEDURE — G0145 SCR C/V CYTO,THINLAYER,RESCR: HCPCS | Performed by: INTERNAL MEDICINE

## 2021-04-05 SDOH — ECONOMIC STABILITY: TRANSPORTATION INSECURITY
IN THE PAST 12 MONTHS, HAS THE LACK OF TRANSPORTATION KEPT YOU FROM MEDICAL APPOINTMENTS OR FROM GETTING MEDICATIONS?: NOT ASKED

## 2021-04-05 SDOH — ECONOMIC STABILITY: TRANSPORTATION INSECURITY
IN THE PAST 12 MONTHS, HAS LACK OF TRANSPORTATION KEPT YOU FROM MEETINGS, WORK, OR FROM GETTING THINGS NEEDED FOR DAILY LIVING?: NOT ASKED

## 2021-04-05 SDOH — ECONOMIC STABILITY: FOOD INSECURITY: WITHIN THE PAST 12 MONTHS, THE FOOD YOU BOUGHT JUST DIDN'T LAST AND YOU DIDN'T HAVE MONEY TO GET MORE.: NOT ASKED

## 2021-04-05 SDOH — ECONOMIC STABILITY: INCOME INSECURITY: HOW HARD IS IT FOR YOU TO PAY FOR THE VERY BASICS LIKE FOOD, HOUSING, MEDICAL CARE, AND HEATING?: NOT ASKED

## 2021-04-05 SDOH — ECONOMIC STABILITY: FOOD INSECURITY: WITHIN THE PAST 12 MONTHS, YOU WORRIED THAT YOUR FOOD WOULD RUN OUT BEFORE YOU GOT MONEY TO BUY MORE.: NOT ASKED

## 2021-04-05 ASSESSMENT — PATIENT HEALTH QUESTIONNAIRE - PHQ9: SUM OF ALL RESPONSES TO PHQ QUESTIONS 1-9: 18

## 2021-04-05 ASSESSMENT — MIFFLIN-ST. JEOR: SCORE: 2078.52

## 2021-04-05 NOTE — PROGRESS NOTES
ASSESSMENT/PLAN                                                       (Z00.00) Routine history and physical examination of adult  (primary encounter diagnosis)  Comment: PMH, PSH, FH, SH, medications, allergies, immunizations, and preventative health measures reviewed and updated as appropriate.  Plan: see below for plans.      (Z13.220) Screening for cholesterol level  (Z13.1) Screening for diabetes mellitus  Plan: fasting labs ordered - patient to schedule.     (Z12.4) Screening for cervical cancer  Plan: pap obtained today.     (K57.32) Diverticulitis of colon  Comment: recurrent (several episodes in the last year).  Plan: [facility] for [facility] evaluation - patient will be contacted to schedule.      (R03.0) Elevated blood pressure reading without diagnosis of hypertension  Plan: lifestyle modifications (regular exercise, heart health diet, and weight loss) recommended.    (F34.1) Persistent depressive disorder  (F41.1) FRANCES (generalized anxiety disorder)  Comment: both currently active; following with a therapist; declines medication at this time.     Melody Echevarria MD   [address] [address]  T: [phone], F: [phone]    SUBJECTIVE                                                      Misyt Serrano is a very pleasant 26 year old female who presents for a physical.    Patient is currently seeing a therapist regularly for anxiety and depression. Medication offered for poorly controlled mood symptoms, but declined.     Patient's blood pressure is noted to be elevated today, even on repeat. No history of or treatment for high blood pressure in the past. She is asymptomatic from a blood pressure perspective: no chest pain or palpitations, no shortness of breath, no light-headedness or dizziness, no headaches or vision changes.     Finally, patient reports having had several episodes of diverticulitis in the last year. Requests GI  referral for further evaluation.     ROS:  Constitutional: no unintentional weight loss or gain reported; no fevers, chills, or sweats reported  Cardiovascular: no chest pain, palpitations, or edema reported  Respiratory: no cough, wheezing, shortness of breath, or dyspnea on exertion reported  Gastrointestinal: no nausea, vomiting, constipation, diarrhea, or abdominal pain reported  Genitourinary: no urinary frequency, urgency, dysuria, or hematuria reported  Integumentary: no rash or pruritus reported  Musculoskeletal: no back pain, muscle pain, joint pain, or joint swelling reported  Neurologic: no focal weakness, numbness, or tingling reported  Hematologic: no easy bruising or bleeding reported  Endocrine: no heat or cold intolerance reported; no polyuria or polydipsia reported  Psychiatric: see PMH below    Past Medical History:   Diagnosis Date     FRANCES (generalized anxiety disorder)      MDD (major depressive disorder)      Morbid obesity (H)      Past Surgical History:   Procedure Laterality Date     NO HISTORY OF SURGERY       Family History   Problem Relation Age of Onset     Hypertension Mother      Hypertension Father      Diverticulitis Father         surgery for this in his mid-50s     Diabetes Type 2  Maternal Grandfather      Coronary Artery Disease Maternal Grandfather         s/p CABG later in life     Prostate Cancer Maternal Grandfather      Skin Cancer Maternal Grandfather      Diabetes Type 2  Paternal Grandfather      Coronary Artery Disease Paternal Grandfather         s/p CABG later in life     Myocardial Infarction No family hx of      Cerebrovascular Disease No family hx of      Coronary Artery Disease Early Onset No family hx of      Breast Cancer No family hx of      Colon Cancer No family hx of      Ovarian Cancer No family hx of      Social History     Occupational History     Occupation: [employer]   Tobacco Use     Smoking status: Never Smoker     Smokeless tobacco:  "Never Used   Substance and Sexual Activity     Alcohol use: Not Currently     Drug use: Never     Sexual activity: Yes     Birth control/protection: Implant     Comment: Nexplanon - placed 2020   Social History Narrative    .    No kids.    No formal exercise.      No Known Allergies     Current Outpatient Medications   Medication Sig     budesonide (RINOCORT AQUA) 32 MCG/ACT nasal spray Spray 1 spray into both nostrils daily as needed for rhinitis or allergies     Etonogestrel (NEXPLANON SC)      Immunization History   Administered Date(s) Administered     DTaP, Unspecified 05/08/2006     HPV9 07/22/2020     HepB, Unspecified 1994     Hib, Unspecified 1994, 1994, 1994     Historical DTP/aP 1994, 1994, 1994, 09/14/1995, 03/08/1999     MMR 06/02/1995, 03/08/1999     Polio, Unspecified  1994, 1994, 09/14/1995, 03/08/1999     Td (Adult), Adsorbed 07/22/2020     Varicella 06/02/1995     PREVENTATIVE HEALTH                                                      BMI: morbidly obese  Blood pressure: elevated - not on medication  Breast CA screening: not medically indicated at this time   Cervical CA screening: DUE  Colon CA screening: not medically indicated at this time   Lung CA screening: n/a   Dexa: not medically indicated at this time   Screening HCV: not medically indicated at this time   Screening HIV: not medically indicated at this time   Screening cholesterol: DUE  Screening diabetes: DUE  STD testing: no risk factors present  Alcohol misuse screening: alcohol use reviewed - no intervention indicated at this time  Immunizations: reviewed; up to date     OBJECTIVE                                                      BP (!) 110/92   Pulse 100   Temp 97.9  F (36.6  C) (Temporal)   Resp 18   Ht 1.753 m (5' 9\")   Wt 127.9 kg (282 lb)   SpO2 98%   BMI 41.64 kg/m    Constitutional: well-appearing  Head, Ears, and Eyes: normocephalic; normal external " auditory canal and pinna; tympanic membranes visualized and normal; normal lids and conjunctivae  Neck: supple, symmetric, no thyromegaly or lymphadenopathy  Respiratory: normal respiratory effort; clear to auscultation bilaterally  Cardiovascular: regular rate and rhythm; no edema  Gastrointestinal: soft, non-tender, and non-distended; no organomegaly or masses  Genitourinary: external genitalia, urethral meatus, and vagina normal; cervix visualized and normal in appearance  Musculoskeletal: normal gait and station  Psych: normal judgment and insight; normal mood and affect; recent and remote memory intact    ---  (Note was completed, in part, with [employer] voice-recognition software. Documentation was reviewed, but some grammatical, spelling, and word errors may remain.)

## 2021-04-06 DIAGNOSIS — Z13.1 SCREENING FOR DIABETES MELLITUS: ICD-10-CM

## 2021-04-06 DIAGNOSIS — Z13.220 SCREENING FOR CHOLESTEROL LEVEL: ICD-10-CM

## 2021-04-06 PROCEDURE — 80053 COMPREHEN METABOLIC PANEL: CPT | Performed by: INTERNAL MEDICINE

## 2021-04-06 PROCEDURE — 36415 COLL VENOUS BLD VENIPUNCTURE: CPT | Performed by: INTERNAL MEDICINE

## 2021-04-06 PROCEDURE — 80061 LIPID PANEL: CPT | Performed by: INTERNAL MEDICINE

## 2021-04-07 LAB
ALBUMIN SERPL-MCNC: 3.8 G/DL (ref 3.4–5)
ALP SERPL-CCNC: 130 U/L (ref 40–150)
ALT SERPL W P-5'-P-CCNC: 40 U/L (ref 0–50)
ANION GAP SERPL CALCULATED.3IONS-SCNC: 3 MMOL/L (ref 3–14)
AST SERPL W P-5'-P-CCNC: 15 U/L (ref 0–45)
BILIRUB SERPL-MCNC: 0.6 MG/DL (ref 0.2–1.3)
BUN SERPL-MCNC: 7 MG/DL (ref 7–30)
CALCIUM SERPL-MCNC: 9.3 MG/DL (ref 8.5–10.1)
CHLORIDE SERPL-SCNC: 106 MMOL/L (ref 94–109)
CHOLEST SERPL-MCNC: 140 MG/DL
CO2 SERPL-SCNC: 27 MMOL/L (ref 20–32)
CREAT SERPL-MCNC: 0.63 MG/DL (ref 0.52–1.04)
GFR SERPL CREATININE-BSD FRML MDRD: >90 ML/MIN/{1.73_M2}
GLUCOSE SERPL-MCNC: 82 MG/DL (ref 70–99)
HDLC SERPL-MCNC: 38 MG/DL
LDLC SERPL CALC-MCNC: 89 MG/DL
NONHDLC SERPL-MCNC: 102 MG/DL
POTASSIUM SERPL-SCNC: 3.8 MMOL/L (ref 3.4–5.3)
PROT SERPL-MCNC: 8 G/DL (ref 6.8–8.8)
SODIUM SERPL-SCNC: 136 MMOL/L (ref 133–144)
TRIGL SERPL-MCNC: 67 MG/DL

## 2021-04-08 LAB
COPATH REPORT: NORMAL
PAP: NORMAL

## 2021-04-14 ENCOUNTER — MYC MEDICAL ADVICE (OUTPATIENT)
Dept: INTERNAL MEDICINE | Facility: CLINIC | Age: 26
End: 2021-04-14

## 2021-04-14 DIAGNOSIS — K59.00 CONSTIPATION, UNSPECIFIED CONSTIPATION TYPE: Primary | ICD-10-CM

## 2021-04-14 DIAGNOSIS — F34.1 PERSISTENT DEPRESSIVE DISORDER: ICD-10-CM

## 2021-04-14 DIAGNOSIS — R10.9 ABDOMINAL CRAMPING: ICD-10-CM

## 2021-04-18 ENCOUNTER — ANCILLARY PROCEDURE (OUTPATIENT)
Dept: GENERAL RADIOLOGY | Facility: CLINIC | Age: 26
End: 2021-04-18
Attending: PHYSICIAN ASSISTANT
Payer: COMMERCIAL

## 2021-04-18 ENCOUNTER — OFFICE VISIT (OUTPATIENT)
Dept: URGENT CARE | Facility: URGENT CARE | Age: 26
End: 2021-04-18
Payer: COMMERCIAL

## 2021-04-18 VITALS
HEART RATE: 95 BPM | OXYGEN SATURATION: 96 % | WEIGHT: 279.4 LBS | BODY MASS INDEX: 41.26 KG/M2 | SYSTOLIC BLOOD PRESSURE: 136 MMHG | RESPIRATION RATE: 16 BRPM | DIASTOLIC BLOOD PRESSURE: 96 MMHG | TEMPERATURE: 99.7 F

## 2021-04-18 DIAGNOSIS — K59.01 SLOW TRANSIT CONSTIPATION: Primary | ICD-10-CM

## 2021-04-18 DIAGNOSIS — R30.0 DYSURIA: ICD-10-CM

## 2021-04-18 LAB
ALBUMIN UR-MCNC: NEGATIVE MG/DL
ANION GAP SERPL CALCULATED.3IONS-SCNC: 4 MMOL/L (ref 3–14)
APPEARANCE UR: CLEAR
BACTERIA #/AREA URNS HPF: ABNORMAL /HPF
BASOPHILS # BLD AUTO: 0.1 10E9/L (ref 0–0.2)
BASOPHILS NFR BLD AUTO: 0.4 %
BILIRUB UR QL STRIP: NEGATIVE
BUN SERPL-MCNC: 10 MG/DL (ref 7–30)
CALCIUM SERPL-MCNC: 8.8 MG/DL (ref 8.5–10.1)
CHLORIDE SERPL-SCNC: 105 MMOL/L (ref 94–109)
CO2 SERPL-SCNC: 27 MMOL/L (ref 20–32)
COLOR UR AUTO: YELLOW
CREAT SERPL-MCNC: 0.72 MG/DL (ref 0.52–1.04)
DIFFERENTIAL METHOD BLD: ABNORMAL
EOSINOPHIL # BLD AUTO: 0.3 10E9/L (ref 0–0.7)
EOSINOPHIL NFR BLD AUTO: 2.7 %
ERYTHROCYTE [DISTWIDTH] IN BLOOD BY AUTOMATED COUNT: 14.4 % (ref 10–15)
GFR SERPL CREATININE-BSD FRML MDRD: >90 ML/MIN/{1.73_M2}
GLUCOSE SERPL-MCNC: 103 MG/DL (ref 70–99)
GLUCOSE UR STRIP-MCNC: NEGATIVE MG/DL
HCG UR QL: NEGATIVE
HCT VFR BLD AUTO: 42.2 % (ref 35–47)
HGB BLD-MCNC: 13.6 G/DL (ref 11.7–15.7)
HGB UR QL STRIP: ABNORMAL
KETONES UR STRIP-MCNC: NEGATIVE MG/DL
LEUKOCYTE ESTERASE UR QL STRIP: ABNORMAL
LYMPHOCYTES # BLD AUTO: 1.9 10E9/L (ref 0.8–5.3)
LYMPHOCYTES NFR BLD AUTO: 17 %
MCH RBC QN AUTO: 27.8 PG (ref 26.5–33)
MCHC RBC AUTO-ENTMCNC: 32.2 G/DL (ref 31.5–36.5)
MCV RBC AUTO: 86 FL (ref 78–100)
MONOCYTES # BLD AUTO: 0.8 10E9/L (ref 0–1.3)
MONOCYTES NFR BLD AUTO: 6.9 %
NEUTROPHILS # BLD AUTO: 8.2 10E9/L (ref 1.6–8.3)
NEUTROPHILS NFR BLD AUTO: 73 %
NITRATE UR QL: NEGATIVE
NON-SQ EPI CELLS #/AREA URNS LPF: ABNORMAL /LPF
PH UR STRIP: 6 PH (ref 5–7)
PLATELET # BLD AUTO: 281 10E9/L (ref 150–450)
POTASSIUM SERPL-SCNC: 3.8 MMOL/L (ref 3.4–5.3)
RBC # BLD AUTO: 4.9 10E12/L (ref 3.8–5.2)
RBC #/AREA URNS AUTO: ABNORMAL /HPF
SODIUM SERPL-SCNC: 136 MMOL/L (ref 133–144)
SOURCE: ABNORMAL
SP GR UR STRIP: 1.02 (ref 1–1.03)
UROBILINOGEN UR STRIP-ACNC: 0.2 EU/DL (ref 0.2–1)
WBC # BLD AUTO: 11.2 10E9/L (ref 4–11)
WBC #/AREA URNS AUTO: ABNORMAL /HPF

## 2021-04-18 PROCEDURE — 85025 COMPLETE CBC W/AUTO DIFF WBC: CPT | Performed by: PHYSICIAN ASSISTANT

## 2021-04-18 PROCEDURE — 99214 OFFICE O/P EST MOD 30 MIN: CPT | Performed by: PHYSICIAN ASSISTANT

## 2021-04-18 PROCEDURE — 36415 COLL VENOUS BLD VENIPUNCTURE: CPT | Performed by: PHYSICIAN ASSISTANT

## 2021-04-18 PROCEDURE — 74019 RADEX ABDOMEN 2 VIEWS: CPT | Performed by: RADIOLOGY

## 2021-04-18 PROCEDURE — 81025 URINE PREGNANCY TEST: CPT | Performed by: PHYSICIAN ASSISTANT

## 2021-04-18 PROCEDURE — 81001 URINALYSIS AUTO W/SCOPE: CPT | Performed by: PHYSICIAN ASSISTANT

## 2021-04-18 PROCEDURE — 80048 BASIC METABOLIC PNL TOTAL CA: CPT | Performed by: PHYSICIAN ASSISTANT

## 2021-04-18 NOTE — PATIENT INSTRUCTIONS
No known etiology at the present moment. DDx was wide and includes ovarian cyst, appendicitis, diverticulitis, constipation, menstrual cramps, UTI among other causes. BMP, urine preg, and UA were negative. WBC was slightly elevated. Since exam was benign, I recommend monitoring at home for the progression of symptoms. Conservative measures discussed including analgesics (Tylenol or Ibuprofen) for pain. Consider pelvic ultrasound if symptoms persist. See your primary care provider if symptoms do not improve in 3 days. Seek emergency care if you develop worsening abdominal pain or fever over 103.

## 2021-04-18 NOTE — PROGRESS NOTES
URGENT CARE VISIT:    SUBJECTIVE:   Misty Serrano is a 26 year old female who presents with abdominal pain for 2 days. Abdominal pain is located over lower abdomen and is described as sharp. Pain timing/severity is described as gradual onset and mild.  Pain is improved by nothing and worsened by movement. Associated symptoms include bloating. She denies nausea, vomiting, diarrhea, constipation, fever, chills, dysuria, or vaginal discharge. She has tried nothing with no relief of symptoms.  Appetite is decreased. Risk factors include hx of diverticulitis but this does not feel the same. Abdominal surgical history includes none. LMP was currently spotting. Sexually active with same partner for 7 years. No concerns for STDs.     PMH:   Past Medical History:   Diagnosis Date     FRANCES (generalized anxiety disorder)      Morbid obesity (H)      Persistent depressive disorder      Allergies: Patient has no known allergies.  Medications:   Current Outpatient Medications   Medication Sig Dispense Refill     Etonogestrel (NEXPLANON SC)        budesonide (RINOCORT AQUA) 32 MCG/ACT nasal spray Spray 1 spray into both nostrils daily as needed for rhinitis or allergies       Social History:   Social History     Socioeconomic History     Marital status:      Spouse name: Not on file     Number of children: Not on file     Years of education: Not on file     Highest education level: Not on file   Occupational History     Occupation: [employer]   Social Needs     Financial resource strain: Not on file     Food insecurity     Worry: Not on file     Inability: Not on file     Transportation needs     Medical: Not on file     Non-medical: Not on file   Tobacco Use     Smoking status: Never Smoker     Smokeless tobacco: Never Used   Substance and Sexual Activity     Alcohol use: Not Currently     Drug use: Never     Sexual activity: Yes     Birth control/protection: Implant     Comment: Nexplanon - placed 2020    Lifestyle     Physical activity     Days per week: Not on file     Minutes per session: Not on file     Stress: Not on file   Relationships     Social connections     Talks on phone: Not on file     Gets together: Not on file     Attends [employer]ism service: Not on file     Active member of club or organization: Not on file     Attends meetings of clubs or organizations: Not on file     Relationship status: Not on file     Intimate partner violence     Fear of current or ex partner: Not on file     Emotionally abused: Not on file     Physically abused: Not on file     Forced sexual activity: Not on file   Other Topics Concern     Not on file   Social History Narrative    .    No kids.    No formal exercise.        ROS: ROS otherwise found to be negative except as noted above.     OBJECTIVE:  BP (!) 136/96   Pulse 95   Temp 99.7  F (37.6  C) (Tympanic)   Resp 16   Wt 126.7 kg (279 lb 6.4 oz)   SpO2 96%   BMI 41.26 kg/m    GENERAL APPEARANCE: healthy, alert and no distress  EYES: EOMI,  PERRL, conjunctiva clear  RESP: lungs clear to auscultation - no rales, rhonchi or wheezes  CV: regular rates and rhythm, normal S1 S2, no murmur noted  ABDOMEN: soft, normal bowel sounds, tenderness mild suprapubic  SKIN: no suspicious lesions or rashes    LABS:  Results for orders placed or performed in visit on 04/18/21   XR Abdomen 2 Views     Status: None    Narrative    ABDOMEN TWO-THREE VIEW  4/18/2021 5:51 PM     HISTORY: Dysuria.    COMPARISON: None.    FINDINGS: Large amount of stool. No free air. There are no air filled  distended loops of small bowel. The colon is not distended. The lung  bases are unremarkable.      Impression    IMPRESSION: Nonobstructed bowel gas pattern.    BELLA GONZALEZ MD   Results for orders placed or performed in visit on 04/18/21   *UA reflex to Microscopic and Culture ([facility] (except [place] and [place])     Status: Abnormal    Specimen: Midstream Urine    Result Value Ref Range    Color Urine Yellow     Appearance Urine Clear     Glucose Urine Negative NEG^Negative mg/dL    Bilirubin Urine Negative NEG^Negative    Ketones Urine Negative NEG^Negative mg/dL    Specific Gravity Urine 1.025 1.003 - 1.035    Blood Urine Small (A) NEG^Negative    pH Urine 6.0 5.0 - 7.0 pH    Protein Albumin Urine Negative NEG^Negative mg/dL    Urobilinogen Urine 0.2 0.2 - 1.0 EU/dL    Nitrite Urine Negative NEG^Negative    Leukocyte Esterase Urine Trace (A) NEG^Negative    Source Midstream Urine    HCG Qual, Urine ([id number])     Status: None   Result Value Ref Range    HCG Qual Urine Negative NEG^Negative   CBC with platelets and differential     Status: Abnormal   Result Value Ref Range    WBC 11.2 (H) 4.0 - 11.0 10e9/L    RBC Count 4.90 3.8 - 5.2 10e12/L    Hemoglobin 13.6 11.7 - 15.7 g/dL    Hematocrit 42.2 35.0 - 47.0 %    MCV 86 78 - 100 fl    MCH 27.8 26.5 - 33.0 pg    MCHC 32.2 31.5 - 36.5 g/dL    RDW 14.4 10.0 - 15.0 %    Platelet Count 281 150 - 450 10e9/L    % Neutrophils 73.0 %    % Lymphocytes 17.0 %    % Monocytes 6.9 %    % Eosinophils 2.7 %    % Basophils 0.4 %    Absolute Neutrophil 8.2 1.6 - 8.3 10e9/L    Absolute Lymphocytes 1.9 0.8 - 5.3 10e9/L    Absolute Monocytes 0.8 0.0 - 1.3 10e9/L    Absolute Eosinophils 0.3 0.0 - 0.7 10e9/L    Absolute Basophils 0.1 0.0 - 0.2 10e9/L    Diff Method Automated Method    Basic metabolic panel  (Ca, Cl, CO2, Creat, Gluc, K, Na, BUN)     Status: Abnormal   Result Value Ref Range    Sodium 136 133 - 144 mmol/L    Potassium 3.8 3.4 - 5.3 mmol/L    Chloride 105 94 - 109 mmol/L    Carbon Dioxide 27 20 - 32 mmol/L    Anion Gap 4 3 - 14 mmol/L    Glucose 103 (H) 70 - 99 mg/dL    Urea Nitrogen 10 7 - 30 mg/dL    Creatinine 0.72 0.52 - 1.04 mg/dL    GFR Estimate >90 >60 mL/min/[1.73_m2]    GFR Estimate If Black >90 >60 mL/min/[1.73_m2]    Calcium 8.8 8.5 - 10.1 mg/dL   Urine Microscopic     Status: Abnormal   Result Value Ref Range    WBC  Urine 0 - 5 OTO5^0 - 5 /HPF    RBC Urine 2-5 (A) OTO2^O - 2 /HPF    Squamous Epithelial /LPF Urine Few FEW^Few /LPF    Bacteria Urine Few (A) NEG^Negative /HPF        ASSESSMENT:     ICD-10-CM    1. Slow transit constipation  K59.01 *UA reflex to Microscopic and Culture ([place] [facility] (except [place] and [place])     HCG Qual, Urine ([id number])     CBC with platelets and differential     Basic metabolic panel  (Ca, Cl, CO2, Creat, Gluc, K, Na, BUN)     XR Abdomen 2 Views     Urine Microscopic        PLAN:  Patient Instructions   Possibly constipation. DDx was wide and includes ovarian cyst, appendicitis, diverticulitis, constipation, menstrual cramps, UTI among other causes. BMP, urine preg, and UA were negative. WBC was slightly elevated. X-ray showed large amounts of stool. Since exam was benign, I recommend monitoring at home for the progression of symptoms. Conservative measures discussed including analgesics (Tylenol or Ibuprofen) for pain and Miralax. Consider pelvic ultrasound if symptoms persist. See your primary care provider if symptoms do not improve in 3 days. Seek emergency care if you develop worsening abdominal pain or fever over 103. Patient verbalized understanding and is agreeable to plan. The patient was discharged ambulatory and in stable condition.    Lory Riggs PA-C ....................  4/18/2021   6:44 PM

## 2021-04-19 ENCOUNTER — IMMUNIZATION (OUTPATIENT)
Dept: NURSING | Facility: CLINIC | Age: 26
End: 2021-04-19
Payer: COMMERCIAL

## 2021-04-19 PROCEDURE — 91300 PR COVID VAC PFIZER DIL RECON 30 MCG/0.3 ML IM: CPT

## 2021-04-19 PROCEDURE — 0001A PR COVID VAC PFIZER DIL RECON 30 MCG/0.3 ML IM: CPT

## 2021-04-26 RX ORDER — HYOSCYAMINE SULFATE 0.125 MG
0.12 TABLET ORAL EVERY 6 HOURS PRN
Qty: 30 TABLET | Refills: 0 | Status: SHIPPED | OUTPATIENT
Start: 2021-04-26 | End: 2021-10-18

## 2021-05-10 ENCOUNTER — IMMUNIZATION (OUTPATIENT)
Dept: NURSING | Facility: CLINIC | Age: 26
End: 2021-05-10
Attending: INTERNAL MEDICINE
Payer: COMMERCIAL

## 2021-05-10 PROCEDURE — 0002A PR COVID VAC PFIZER DIL RECON 30 MCG/0.3 ML IM: CPT

## 2021-05-10 PROCEDURE — 91300 PR COVID VAC PFIZER DIL RECON 30 MCG/0.3 ML IM: CPT

## 2021-05-14 ENCOUNTER — OFFICE VISIT (OUTPATIENT)
Dept: URGENT CARE | Facility: URGENT CARE | Age: 26
End: 2021-05-14
Payer: COMMERCIAL

## 2021-05-14 VITALS
HEART RATE: 83 BPM | DIASTOLIC BLOOD PRESSURE: 98 MMHG | SYSTOLIC BLOOD PRESSURE: 142 MMHG | RESPIRATION RATE: 16 BRPM | TEMPERATURE: 98.2 F | WEIGHT: 279 LBS | BODY MASS INDEX: 41.2 KG/M2 | OXYGEN SATURATION: 100 %

## 2021-05-14 DIAGNOSIS — M54.50 ACUTE BILATERAL LOW BACK PAIN WITHOUT SCIATICA: Primary | ICD-10-CM

## 2021-05-14 DIAGNOSIS — M62.830 BACK MUSCLE SPASM: ICD-10-CM

## 2021-05-14 PROCEDURE — 99214 OFFICE O/P EST MOD 30 MIN: CPT | Performed by: FAMILY MEDICINE

## 2021-05-14 RX ORDER — CYCLOBENZAPRINE HCL 10 MG
10 TABLET ORAL 2 TIMES DAILY PRN
Qty: 20 TABLET | Refills: 0 | Status: SHIPPED | OUTPATIENT
Start: 2021-05-14 | End: 2022-04-24

## 2021-05-14 NOTE — LETTER
[facility] [place]  [address] [address]  Phone: [phone]    05/14/21    Misty Serrano  [address]   [place] [address]      To whom it may concern:     Misty Serrano was seen in the clinic for current illness, she will be getting back to work on Monday 17th ,2021.    Sincerely,      Meliza Jaime MD

## 2021-05-14 NOTE — PATIENT INSTRUCTIONS
Patient Education     Exercises to Strengthen Your Lower Back  Strong lower back and abdominal muscles work together to support your spine. The exercises below will help strengthen the lower back. It's important that you start exercising slowly and increase levels gradually.   Always start any exercise program with stretching. If you feel pain while doing any of these exercises, stop and talk to your doctor about a more specific exercise program that better suits your condition.    Low back stretch  The point of stretching is to make you more flexible and increase your range of motion. Stretch only as much as you are able. Stretch slowly. Don't push your stretch to the limit. If at any point you feel pain while stretching, this is your (temporary) limit.     Lie on your back with your knees bent and both feet on the ground.    Slowly raise your left knee to your chest as you flatten your lower back against the floor. Hold for 5 seconds.    Relax and repeat the exercise with your right knee.    Do 10 of these exercises for each leg.    Repeat hugging both knees to your chest at the same time.  Building lower back strength  Start your exercise routine with 10 to 30 minutes a day, 1 to 3 times a day.  Initial exercises  Lying on your back:  1. Ankle pumps. Move your foot up and down, towards your head, and then away. Repeat 10 times with each foot.  2. Heel slides. Slowly bend your knee, drawing the heel of your foot towards you. Then slide your heel/foot from you, straightening your knee. Don't lift your foot off the floor (this is not a leg lift).  3. Abdominal contraction. Bend your knees and put your hands on your stomach. Tighten your stomach muscles. Hold for 5 seconds, then relax. Repeat 10 times.  4. Straight leg raise. Bend one leg at the knee and keep the other leg straight. Tighten your stomach muscles. Slowly lift your straight leg 6 to 12 inches off the floor and hold for up to 5 seconds. Repeat 10 times  on each side.  Standin. Wall squats. Stand with your back against the wall. Move your feet about 12 inches away from the wall. Tighten your stomach muscles, and slowly bend your knees until they are at about a 45 degree angle. Don't go down too far. Hold about 5 seconds. Then slowly return to your starting position. Repeat 10 times.  2. Heel raises. Stand facing the wall. Slowly raise the heels of your feet up and down, while keeping your toes on the floor. If you have trouble balancing, you can touch the wall with your hands. Repeat 10 times.  More advanced exercises  When you feel comfortable enough, try these exercises.  1. Kneeling lumbar extension. Start on your hands and knees. At the same time, raise and straighten your right arm and left leg until they are parallel to the ground. Hold for 2 seconds and come back slowly to a starting position. Repeat with left arm and right leg, alternating 10 times.  2. Prone lumbar extension. Lie face down, arms extended overhead, palms on the floor. At the same time, raise your right arm and left leg as high as comfortably possible. Hold for 10 seconds and slowly return to start. Repeat with left arm and right leg, alternating 10 times. Gradually build up to 20 times. (Advanced: Repeat this exercise raising both arms and both legs a few inches off the floor at the same time. Hold for 5 seconds and release.)  3. Pelvic tilt. Lie on the floor on your back with your knees bent at 90 degrees. Your feet should be flat on the floor. Inhale, exhale, then slowly contract your abdominal muscles bringing your navel toward your spine. Let your pelvis rock back until your lower back is flat on the floor. Hold for 10 seconds while breathing smoothly.  4. Abdominal crunch. Perform a pelvic tilt (above) flattening your lower back against the floor. Holding the tension in your abdominal muscles, take another breath and raise your shoulder blades off the ground (this is not a full  sit-up). Keep your head in line with your body (don t bend your neck forward). Hold for 2 seconds, then slowly lower.  [employer] last reviewed this educational content on 8/1/2019 2000-2021 [employer]. All rights reserved. This information is not intended as a substitute for professional medical care. Always follow your healthcare professional's instructions.           Patient Education     Exercises to Strengthen Your Lower Back  Strong lower back and abdominal muscles work together to support your spine. The exercises below will help strengthen the lower back. It's important that you start exercising slowly and increase levels gradually.   Always start any exercise program with stretching. If you feel pain while doing any of these exercises, stop and talk to your doctor about a more specific exercise program that better suits your condition.    Low back stretch  The point of stretching is to make you more flexible and increase your range of motion. Stretch only as much as you are able. Stretch slowly. Don't push your stretch to the limit. If at any point you feel pain while stretching, this is your (temporary) limit.     Lie on your back with your knees bent and both feet on the ground.    Slowly raise your left knee to your chest as you flatten your lower back against the floor. Hold for 5 seconds.    Relax and repeat the exercise with your right knee.    Do 10 of these exercises for each leg.    Repeat hugging both knees to your chest at the same time.  Building lower back strength  Start your exercise routine with 10 to 30 minutes a day, 1 to 3 times a day.  Initial exercises  Lying on your back:  5. Ankle pumps. Move your foot up and down, towards your head, and then away. Repeat 10 times with each foot.  6. Heel slides. Slowly bend your knee, drawing the heel of your foot towards you. Then slide your heel/foot from you, straightening your knee. Don't lift your foot off the floor (this is not  a leg lift).  7. Abdominal contraction. Bend your knees and put your hands on your stomach. Tighten your stomach muscles. Hold for 5 seconds, then relax. Repeat 10 times.  8. Straight leg raise. Bend one leg at the knee and keep the other leg straight. Tighten your stomach muscles. Slowly lift your straight leg 6 to 12 inches off the floor and hold for up to 5 seconds. Repeat 10 times on each side.  Standing:  3. Wall squats. Stand with your back against the wall. Move your feet about 12 inches away from the wall. Tighten your stomach muscles, and slowly bend your knees until they are at about a 45 degree angle. Don't go down too far. Hold about 5 seconds. Then slowly return to your starting position. Repeat 10 times.  4. Heel raises. Stand facing the wall. Slowly raise the heels of your feet up and down, while keeping your toes on the floor. If you have trouble balancing, you can touch the wall with your hands. Repeat 10 times.  More advanced exercises  When you feel comfortable enough, try these exercises.  5. Kneeling lumbar extension. Start on your hands and knees. At the same time, raise and straighten your right arm and left leg until they are parallel to the ground. Hold for 2 seconds and come back slowly to a starting position. Repeat with left arm and right leg, alternating 10 times.  6. Prone lumbar extension. Lie face down, arms extended overhead, palms on the floor. At the same time, raise your right arm and left leg as high as comfortably possible. Hold for 10 seconds and slowly return to start. Repeat with left arm and right leg, alternating 10 times. Gradually build up to 20 times. (Advanced: Repeat this exercise raising both arms and both legs a few inches off the floor at the same time. Hold for 5 seconds and release.)  7. Pelvic tilt. Lie on the floor on your back with your knees bent at 90 degrees. Your feet should be flat on the floor. Inhale, exhale, then slowly contract your abdominal muscles  bringing your navel toward your spine. Let your pelvis rock back until your lower back is flat on the floor. Hold for 10 seconds while breathing smoothly.  8. Abdominal crunch. Perform a pelvic tilt (above) flattening your lower back against the floor. Holding the tension in your abdominal muscles, take another breath and raise your shoulder blades off the ground (this is not a full sit-up). Keep your head in line with your body (don t bend your neck forward). Hold for 2 seconds, then slowly lower.  [employer] last reviewed this educational content on 8/1/2019 2000-2021 [employer]. All rights reserved. This information is not intended as a substitute for professional medical care. Always follow your healthcare professional's instructions.

## 2021-05-14 NOTE — PROGRESS NOTES
Chief Complaint   Patient presents with     Back Pain     Pt states she is having all over back pain that started 2 days ago       Medical Decision Making:    ASSESMENT AND PLAN     Misty was seen today for back pain.    Diagnoses and all orders for this visit:    Acute bilateral low back pain without sciatica  -     cyclobenzaprine (FLEXERIL) 10 MG tablet; Take 1 tablet (10 mg) by mouth 2 times daily as needed for muscle spasms    Back muscle spasm          Tylenol, Ibuprofen and Rest  Discussed about back stretching exercises   Take Muscle relaxants meaning of that shift her reply was I need to hold on that for a minute we are working on schedule adjustment for poor diet I have no idea that does not as directed discussed that it can cause some sedation too  Discussed with pt that if symptoms worsen should follow up       I have reviewed the nursing notes.  Differential Diagnosis:  Back Pain: myofascial low back strain, lumbosacral strain, degenerative disc disease and possible herniated disc       See orders in Epic  Pt verbalized and agreed with the plan and is aware of the worsening symptoms for which would need to follow up .  Pt was stable during time of discharge from the clinic     X-Ray was not done.      Time  spent on the date of the encounter doing chart review, patient visit and documentation     If not improving or if condition worsens, follow up with your Primary Care Provider    SUBJECTIVE     Misty Serrano is a 26 year old female presenting with a chief complaint of    Chief Complaint   Patient presents with     Back Pain     Pt states she is having all over back pain that started 2 days ago     Back Pain    Onset of symptoms was 2 day(s) ago.  Location: bilateral low back  Radiation: does not radiate  Context:           Mechanism: none recalled by the patient      Patient experienced delayed pain  Course of symptoms is waxing and waning.    Severity moderate  Current and Associated symptoms:  pain  Denies: fecal incontinence, urinary incontinence, lower extremity numbness, lower extremity weakness and paresthesia    Aggravating Factors: changing position  Therapies to improve symptoms include: heat, ice and ibuprofen  Past history: recurrent self limited episodes of low back pain in the past            Past Medical History:   Diagnosis Date     FRANCES (generalized anxiety disorder)      Morbid obesity (H)      Persistent depressive disorder      Current Outpatient Medications   Medication Sig Dispense Refill     budesonide (RINOCORT AQUA) 32 MCG/ACT nasal spray Spray 1 spray into both nostrils daily as needed for rhinitis or allergies       Etonogestrel (NEXPLANON SC)        hyoscyamine (LEVSIN) 0.125 MG tablet Take 1 tablet (125 mcg) by mouth every 6 hours as needed for cramping 30 tablet 0     Social History     Tobacco Use     Smoking status: Never Smoker     Smokeless tobacco: Never Used   Substance Use Topics     Alcohol use: Not Currently     Family History   Problem Relation Age of Onset     Hypertension Mother      Hypertension Father      Diverticulitis Father         surgery for this in his mid-50s     Diabetes Type 2  Maternal Grandfather      Coronary Artery Disease Maternal Grandfather         s/p CABG later in life     Prostate Cancer Maternal Grandfather      Skin Cancer Maternal Grandfather      Diabetes Type 2  Paternal Grandfather      Coronary Artery Disease Paternal Grandfather         s/p CABG later in life     Myocardial Infarction No family hx of      Cerebrovascular Disease No family hx of      Coronary Artery Disease Early Onset No family hx of      Breast Cancer No family hx of      Colon Cancer No family hx of      Ovarian Cancer No family hx of          ROS:    10 point ROS of systems including Constitutional, Eyes, Respiratory, Cardiovascular, Gastroenterology, Genitourinary, Integumentary, Psychiatric ,neurological were all negative except for pertinent positives noted in my  HPI         OBJECTIVE:    BP (!) 142/98   Pulse 83   Temp 98.2  F (36.8  C) (Tympanic)   Resp 16   Wt 126.6 kg (279 lb)   SpO2 100%   Breastfeeding No   BMI 41.20 kg/m    GENERAL APPEARANCE: healthy, alert and no distress  EYES: EOMI,  PERRL, conjunctiva clear  Extremities: no peripheral edema or tenderness, peripheral pulses normal  NEURO: Normal strength and tone, sensory exam grossly normal,  normal speech and mentation  PSYCH: mentation appears normal  Physical Exam      (Note was completed, in part, with [employer] voice-recognition software. Documentation reviewed, but some grammatical, spelling, and word errors may remain.)  Meliza Jaime MD on 5/14/2021 at 11:30 AM

## 2021-06-02 ENCOUNTER — VIRTUAL VISIT (OUTPATIENT)
Dept: GASTROENTEROLOGY | Facility: CLINIC | Age: 26
End: 2021-06-02
Attending: INTERNAL MEDICINE
Payer: COMMERCIAL

## 2021-06-02 DIAGNOSIS — K57.32 DIVERTICULITIS OF COLON: Primary | ICD-10-CM

## 2021-06-02 PROCEDURE — 99203 OFFICE O/P NEW LOW 30 MIN: CPT | Mod: 95 | Performed by: INTERNAL MEDICINE

## 2021-06-02 NOTE — PROGRESS NOTES
Suki is a 26 year old who is being evaluated via a billable video visit.      How would you like to obtain your AVS? MyChart  If the video visit is dropped, the invitation should be resent by: Send to e-mail at: [email or web address]  Will anyone else be joining your video visit? No      Video Start Time:   Video-Visit Details    Type of service:  Video Visit    Video End Time:    Originating Location (pt. Location):     Distant Location (provider location):  [facility]     Platform used for Video Visit:   Brett Oliver CMA

## 2021-06-02 NOTE — PATIENT INSTRUCTIONS
Please schedule a colonoscopy with me whenever is convenient for you.    Please start a fiber supplement like citrucel or metamucil once daily.

## 2021-06-02 NOTE — PROGRESS NOTES
HPI:    Suki  presents today for a video visit to discuss recurrent diverticulitis.  First episode was in July of 2020 - had a CT at the time that showed uncomplicated diverticulitis at the junction of the sigmoid and descending colon.  Symptoms rapidly improved with antibiotics.  Had another episode in October with CT again showing inflammatory changes in the same area.  Has had a few episodes since which were treated with antibiotics although no imaging was completed.  Has tried managing conservatively with antispasmodics like levsin, however, has usually eventually required antibiotics.   In between episodes she feels well without abdominal pain, diarrhea, blood in the stool, etc.  Does struggle occasionally with constipation which she tries to manage by increasing the amount of fiber in her diet.    Rare NSAIDs.  Never smoker.  No alcohol.  No other concerns today.      Past Medical History:   Diagnosis Date     FRANCES (generalized anxiety disorder)      Morbid obesity (H)      Persistent depressive disorder        Past Surgical History:   Procedure Laterality Date     NO HISTORY OF SURGERY         Family History   Problem Relation Age of Onset     Hypertension Mother      Hypertension Father      Diverticulitis Father         surgery for this in his mid-50s     Diabetes Type 2  Maternal Grandfather      Coronary Artery Disease Maternal Grandfather         s/p CABG later in life     Prostate Cancer Maternal Grandfather      Skin Cancer Maternal Grandfather      Diabetes Type 2  Paternal Grandfather      Coronary Artery Disease Paternal Grandfather         s/p CABG later in life     Myocardial Infarction No family hx of      Cerebrovascular Disease No family hx of      Coronary Artery Disease Early Onset No family hx of      Breast Cancer No family hx of      Colon Cancer No family hx of      Ovarian Cancer No family hx of      Mom with RA    Social History     Tobacco Use     Smoking status: Never Smoker      Smokeless tobacco: Never Used   Substance Use Topics     Alcohol use: Not Currently        O:    Gen: no acute distress  HEENT: NCAT  Neck: normal ROM  Resp: nonlabored breathing  Neuro: no gross deficits  Psych: appropriate mood and affect    Assessment and Plan:    # recurrent diverticulitis - symptoms resolve completely with antibiotics.  Will plan for colonoscopy as she has not had this completed for further evaluation.  Further recommendations pending those results    RTC PRN    Tejal Jarrett, DO     Video-Visit Details     Type of service:  Video Visit     Video Start Time: 4:00 PM    Originating Location (pt. Location): home     Distant Location (provider location):  [facility]      Mode of Communication:  Video Conference via [employer]

## 2021-06-12 DIAGNOSIS — Z11.59 ENCOUNTER FOR SCREENING FOR OTHER VIRAL DISEASES: ICD-10-CM

## 2021-07-04 ENCOUNTER — ANCILLARY PROCEDURE (OUTPATIENT)
Dept: GENERAL RADIOLOGY | Facility: CLINIC | Age: 26
End: 2021-07-04
Attending: FAMILY MEDICINE
Payer: COMMERCIAL

## 2021-07-04 ENCOUNTER — OFFICE VISIT (OUTPATIENT)
Dept: URGENT CARE | Facility: URGENT CARE | Age: 26
End: 2021-07-04
Payer: COMMERCIAL

## 2021-07-04 VITALS
HEART RATE: 95 BPM | DIASTOLIC BLOOD PRESSURE: 88 MMHG | SYSTOLIC BLOOD PRESSURE: 128 MMHG | TEMPERATURE: 98.1 F | OXYGEN SATURATION: 97 %

## 2021-07-04 DIAGNOSIS — K59.00 CONSTIPATION, UNSPECIFIED CONSTIPATION TYPE: Primary | ICD-10-CM

## 2021-07-04 DIAGNOSIS — R10.32 LLQ ABDOMINAL PAIN: ICD-10-CM

## 2021-07-04 DIAGNOSIS — R11.0 NAUSEA: ICD-10-CM

## 2021-07-04 LAB
ALBUMIN SERPL-MCNC: 3.7 G/DL (ref 3.4–5)
ALBUMIN UR-MCNC: NEGATIVE MG/DL
ALP SERPL-CCNC: 138 U/L (ref 40–150)
ALT SERPL W P-5'-P-CCNC: 44 U/L (ref 0–50)
ANION GAP SERPL CALCULATED.3IONS-SCNC: 2 MMOL/L (ref 3–14)
APPEARANCE UR: CLEAR
AST SERPL W P-5'-P-CCNC: 16 U/L (ref 0–45)
BASOPHILS # BLD AUTO: 0 10E9/L (ref 0–0.2)
BASOPHILS NFR BLD AUTO: 0.2 %
BILIRUB SERPL-MCNC: 0.5 MG/DL (ref 0.2–1.3)
BILIRUB UR QL STRIP: NEGATIVE
BUN SERPL-MCNC: 8 MG/DL (ref 7–30)
CALCIUM SERPL-MCNC: 9 MG/DL (ref 8.5–10.1)
CHLORIDE SERPL-SCNC: 104 MMOL/L (ref 94–109)
CO2 SERPL-SCNC: 30 MMOL/L (ref 20–32)
COLOR UR AUTO: YELLOW
CREAT SERPL-MCNC: 0.72 MG/DL (ref 0.52–1.04)
DIFFERENTIAL METHOD BLD: ABNORMAL
EOSINOPHIL # BLD AUTO: 0.2 10E9/L (ref 0–0.7)
EOSINOPHIL NFR BLD AUTO: 1.4 %
ERYTHROCYTE [DISTWIDTH] IN BLOOD BY AUTOMATED COUNT: 14.7 % (ref 10–15)
GFR SERPL CREATININE-BSD FRML MDRD: >90 ML/MIN/{1.73_M2}
GLUCOSE SERPL-MCNC: 127 MG/DL (ref 70–99)
GLUCOSE UR STRIP-MCNC: NEGATIVE MG/DL
HCT VFR BLD AUTO: 45 % (ref 35–47)
HGB BLD-MCNC: 14.6 G/DL (ref 11.7–15.7)
HGB UR QL STRIP: ABNORMAL
KETONES UR STRIP-MCNC: NEGATIVE MG/DL
LEUKOCYTE ESTERASE UR QL STRIP: NEGATIVE
LYMPHOCYTES # BLD AUTO: 1.5 10E9/L (ref 0.8–5.3)
LYMPHOCYTES NFR BLD AUTO: 10.7 %
MCH RBC QN AUTO: 27.8 PG (ref 26.5–33)
MCHC RBC AUTO-ENTMCNC: 32.4 G/DL (ref 31.5–36.5)
MCV RBC AUTO: 86 FL (ref 78–100)
MONOCYTES # BLD AUTO: 0.6 10E9/L (ref 0–1.3)
MONOCYTES NFR BLD AUTO: 4.6 %
NEUTROPHILS # BLD AUTO: 11.6 10E9/L (ref 1.6–8.3)
NEUTROPHILS NFR BLD AUTO: 83.1 %
NITRATE UR QL: NEGATIVE
NON-SQ EPI CELLS #/AREA URNS LPF: ABNORMAL /LPF
PH UR STRIP: 6 PH (ref 5–7)
PLATELET # BLD AUTO: 258 10E9/L (ref 150–450)
POTASSIUM SERPL-SCNC: 3.9 MMOL/L (ref 3.4–5.3)
PROT SERPL-MCNC: 8.2 G/DL (ref 6.8–8.8)
RBC # BLD AUTO: 5.26 10E12/L (ref 3.8–5.2)
RBC #/AREA URNS AUTO: ABNORMAL /HPF
SODIUM SERPL-SCNC: 136 MMOL/L (ref 133–144)
SOURCE: ABNORMAL
SP GR UR STRIP: 1.02 (ref 1–1.03)
UROBILINOGEN UR STRIP-ACNC: 0.2 EU/DL (ref 0.2–1)
WBC # BLD AUTO: 14 10E9/L (ref 4–11)
WBC #/AREA URNS AUTO: ABNORMAL /HPF

## 2021-07-04 PROCEDURE — 85025 COMPLETE CBC W/AUTO DIFF WBC: CPT | Performed by: FAMILY MEDICINE

## 2021-07-04 PROCEDURE — 81001 URINALYSIS AUTO W/SCOPE: CPT | Performed by: FAMILY MEDICINE

## 2021-07-04 PROCEDURE — 74019 RADEX ABDOMEN 2 VIEWS: CPT | Performed by: RADIOLOGY

## 2021-07-04 PROCEDURE — 80053 COMPREHEN METABOLIC PANEL: CPT | Performed by: FAMILY MEDICINE

## 2021-07-04 PROCEDURE — 36415 COLL VENOUS BLD VENIPUNCTURE: CPT | Performed by: FAMILY MEDICINE

## 2021-07-04 PROCEDURE — 99214 OFFICE O/P EST MOD 30 MIN: CPT | Performed by: FAMILY MEDICINE

## 2021-07-04 RX ORDER — MINERAL OIL 100 G/100G
1 OIL RECTAL ONCE
Qty: 1 ENEMA | Refills: 0 | Status: SHIPPED | OUTPATIENT
Start: 2021-07-04 | End: 2021-07-04

## 2021-07-04 NOTE — PROGRESS NOTES
Chief Complaint   Patient presents with     Headache     24 hours with HA. Nausea     Constipation     BM about an hour ago but it was pebble-like stool       Medical Decision Making:    ASSESMENT AND PLAN     Misty was seen today for headache and constipation.    Diagnoses and all orders for this visit:    Constipation, unspecified constipation type  -     XR Abdomen 2 Views  -     mineral oil enema; Place 1 enema rectally once for 1 dose    LLQ abdominal pain  -     CBC with platelets and differential  -     Comprehensive metabolic panel (BMP + Alb, Alk Phos, ALT, AST, Total. Bili, TP)  -     *UA reflex to Microscopic and Culture ([facility] (except [place] and [place])    Nausea        Discussed with patient about the elevated white blood cell count and also moderate stool  Suggested to do the enema   Also as has some nausea symptoms and also elevated WBC count would consider treating with with antibiotic for 7 days discussed with patient if symptoms get worse in the belly pain gets worse should follow-up in the ER patient understood and agreed with plan  Tylenol, Fluids and Rest    I have reviewed the nursing notes.    Differential Diagnosis:  Gastro: viral gastroenteritis, food poisoning and IBS, constipation , diverticulitis, SBO ,        Review of the result(s) of each unique test -     X-Ray was done, my findings are: large amount of stool noted no SBO noted     Time  spent on the date of the encounter doing chart review, review of test results, interpretation of tests, patient visit and documentation     If not improving or if conditions worsens over the next 12-24 hours, go to the Emergency Department    see orders in Epic  Pt verbalized and agreed with the plan and is aware of the worsening symptoms for which would need to follow up .  Pt was stable during time of discharge from the clinic     SUBJECTIVE     Misty Serrano is a 26 year old female presenting with a chief complaint  of    Chief Complaint   Patient presents with     Headache     24 hours with HA. Nausea     Constipation     BM about an hour ago but it was pebble-like stool     Abdominal Pain    Location: LLQ   Radiation: None.    Pain character: dull,   Severity: 4 on a scale of 1-10.    Duration: 1 day(s)   Course of Illness: stable.  Exacerbated by: nothing  Relieved by: nothing.  Associated Symptoms: nausea and constipation.  Female : Not applicable  Surgical History: none  She has been constipated for few days and has been doing laxatives for that and had a good bowel movement yesterday        Past Medical History:   Diagnosis Date     FRANCES (generalized anxiety disorder)      Morbid obesity (H)      Persistent depressive disorder      Current Outpatient Medications   Medication Sig Dispense Refill     budesonide (RINOCORT AQUA) 32 MCG/ACT nasal spray Spray 1 spray into both nostrils daily as needed for rhinitis or allergies       cyclobenzaprine (FLEXERIL) 10 MG tablet Take 1 tablet (10 mg) by mouth 2 times daily as needed for muscle spasms 20 tablet 0     Etonogestrel (NEXPLANON SC)        hyoscyamine (LEVSIN) 0.125 MG tablet Take 1 tablet (125 mcg) by mouth every 6 hours as needed for cramping 30 tablet 0     mineral oil enema Place 1 enema rectally once for 1 dose 1 enema 0     Social History     Tobacco Use     Smoking status: Never Smoker     Smokeless tobacco: Never Used   Substance Use Topics     Alcohol use: Not Currently     Family History   Problem Relation Age of Onset     Hypertension Mother      Hypertension Father      Diverticulitis Father         surgery for this in his mid-50s     Diabetes Type 2  Maternal Grandfather      Coronary Artery Disease Maternal Grandfather         s/p CABG later in life     Prostate Cancer Maternal Grandfather      Skin Cancer Maternal Grandfather      Diabetes Type 2  Paternal Grandfather      Coronary Artery Disease Paternal Grandfather         s/p CABG later in life      Myocardial Infarction No family hx of      Cerebrovascular Disease No family hx of      Coronary Artery Disease Early Onset No family hx of      Breast Cancer No family hx of      Colon Cancer No family hx of      Ovarian Cancer No family hx of          ROS:    10 point ROS of systems including Constitutional, Eyes, Respiratory, Cardiovascular, Genitourinary, Integumentary, Muscularskeletal, Psychiatric ,neurological were all negative except for pertinent positives noted in my HPI         OBJECTIVE:    /88   Pulse 95   Temp 98.1  F (36.7  C) (Tympanic)   SpO2 97%   GENERAL APPEARANCE: healthy, alert and no distress  EYES: EOMI,  PERRL, conjunctiva clear  HENT: ear canals and TM's normal.  Nose and mouth without ulcers, erythema or lesions  RESP: lungs clear to auscultation - no rales, rhonchi or wheezes  CV: regular rates and rhythm, normal S1 S2, no murmur noted  ABDOMEN:  soft, LLQ tender, no HSM or masses and bowel sounds reduced   SKIN: no suspicious lesions or rashes  PSYCH: mentation appears normal  Physical Exam      (Note was completed, in part, with [employer] voice-recognition software. Documentation reviewed, but some grammatical, spelling, and word errors may remain.)  Meliza Jaime MD on 7/4/2021 at 4:30 PM

## 2021-08-06 RX ORDER — BISACODYL 5 MG/1
15 TABLET, DELAYED RELEASE ORAL SEE ADMIN INSTRUCTIONS
Qty: 3 TABLET | Refills: 0 | Status: SHIPPED | OUTPATIENT
Start: 2021-08-06 | End: 2022-04-25

## 2021-08-06 RX ORDER — SODIUM, POTASSIUM,MAG SULFATES 17.5-3.13G
1 SOLUTION, RECONSTITUTED, ORAL ORAL SEE ADMIN INSTRUCTIONS
Qty: 177 ML | Refills: 0 | Status: SHIPPED | OUTPATIENT
Start: 2021-08-06 | End: 2022-04-25

## 2021-08-09 ENCOUNTER — LAB (OUTPATIENT)
Dept: URGENT CARE | Facility: URGENT CARE | Age: 26
End: 2021-08-09
Payer: COMMERCIAL

## 2021-08-09 DIAGNOSIS — Z11.59 ENCOUNTER FOR SCREENING FOR OTHER VIRAL DISEASES: ICD-10-CM

## 2021-08-09 PROCEDURE — 99207 PR NO CHARGE LOS: CPT

## 2021-08-09 PROCEDURE — U0003 INFECTIOUS AGENT DETECTION BY NUCLEIC ACID (DNA OR RNA); SEVERE ACUTE RESPIRATORY SYNDROME CORONAVIRUS 2 (SARS-COV-2) (CORONAVIRUS DISEASE [COVID-19]), AMPLIFIED PROBE TECHNIQUE, MAKING USE OF HIGH THROUGHPUT TECHNOLOGIES AS DESCRIBED BY CMS-2020-01-R: HCPCS

## 2021-08-09 PROCEDURE — U0005 INFEC AGEN DETEC AMPLI PROBE: HCPCS

## 2021-08-10 LAB — SARS-COV-2 RNA RESP QL NAA+PROBE: NEGATIVE

## 2021-08-12 ENCOUNTER — NURSE TRIAGE (OUTPATIENT)
Dept: NURSING | Facility: CLINIC | Age: 26
End: 2021-08-12

## 2021-08-12 NOTE — TELEPHONE ENCOUNTER
[facility]. Patient has taken in 24 ounces and then vomited.   Protocol recommends home care. Care advice given. Caller reports understanding and will call back if vomiting continues or unable to continue taking the bowel prep.   Crystal Crump RN   08/12/21 7:05 PM  [facility] Nurse Advisor  COVID 19 Nurse Triage Plan/Patient Instructions    Please be aware that novel coronavirus (COVID-19) may be circulating in the community. If you develop symptoms such as fever, cough, or SOB or if you have concerns about the presence of another infection including coronavirus (COVID-19), please contact your health care provider or visit [email or web address].     Disposition/Instructions    Home care recommended. Follow home care protocol based instructions.    Thank you for taking steps to prevent the spread of this virus.  o Limit your contact with others.  o Wear a simple mask to cover your cough.  o Wash your hands well and often.    Resources    [facility]: About COVID-19: [email or web address]    CDC: What to Do If You're Sick: www.cdc.gov/coronavirus/2019-ncov/about/steps-when-sick.html    CDC: Ending Home Isolation: www.cdc.gov/coronavirus/2019-ncov/hcp/disposition-in-home-patients.html     CDC: Caring for Someone: www.cdc.gov/coronavirus/2019-ncov/if-you-are-sick/care-for-someone.html     [facility]: Interim Guidance for Hospital Discharge to Home: www.health.[facility].mn.us/diseases/coronavirus/hcp/hospdischarge.pdf    [facility] clinical trials (COVID-19 research studies): [email or web address]     Below are the COVID-19 hotlines at the [facility] of Health ([facility]). Interpreters are available.   o For health questions: Call 115-839-3307 or 1-850.814.3039 (7 a.m. to 7 p.m.)  o For questions about schools and childcare: Call 919-889-8980 or 1-849.429.1136 (7 a.m. to 7 p.m.)     Reason for Disposition     Abdominal bloating, cramping, nausea, or vomiting while drinking bowel prep, questions about    Additional Information    Negative: Shock suspected (e.g., cold/pale/clammy skin, too weak to stand, low BP, rapid pulse)    Negative: Difficult to awaken or acting confused (e.g., disoriented, slurred speech)    Negative: Passed out (i.e., lost consciousness, collapsed and was not responding)    Negative: Sounds like a life-threatening emergency to the triager    Negative: Breathing difficulty    Negative: Chest pain    Negative: [1] Abdomen pain is main concern AND [2] started > 3 days (72 hours) after colonoscopy AND [3] Female    Negative: [1] Abdomen pain is main concern AND [2] started > 3 days (72 hours) after colonoscopy AND [3] male    Negative: [1] Vomiting is main concern AND [2] started > 3 days after colonoscopy    Negative: SEVERE abdomen pain (e.g., excruciating)    Negative: SEVERE rectal bleeding (large blood clots; on and off, or constant bleeding)    Negative: SEVERE dizziness (e.g., unable to stand, requires support to walk, feels like passing out now)    Negative: SEVERE vomiting (e.g., 6 or more times/day)    Negative: [1] MODERATE rectal bleeding (small blood clots, passing blood without stool, or toilet water turns red) AND [2] more than once    Negative: [1] MILD-MODERATE abdomen pain AND [2] constant AND [3] present > 2 hours    Negative: [1] Drinking very little AND [2] dehydration suspected (e.g., no urine > 12 hours, very dry mouth, very lightheaded)    Negative: Patient sounds very sick or weak to the triager    Negative: Fever > 100.4 F (38.0 C)    Negative: [1] Abdominal bloating, cramping, nausea, or vomiting while drinking bowel prep AND [2] CANNOT finish bowel prep AND [3] has tried recommended Care Advice    Negative: [1] Caller has URGENT question or concern AND [2] triager unable to answer question    Negative: [1] MILD-MODERATE abdomen pain (e.g., does not interfere with normal  activities) AND [2] pain comes and goes (cramps) [3] lasting > 48 hours    Negative: [1] MILD to MODERATE vomiting (e.g., 1 to 5 times a day) AND [2] lasting > 24 hours    Negative: Any rectal bleeding occurring > 24 hours after colonoscopy    Negative: [1] Caller has NON-URGENT question AND [2] triager unable to answer question    Negative: Abdominal cramping and bloating after colonoscopy, questions about    Negative: Rectal bleeding (slight; streaks or less than 1 teaspoon or 5 ml) after colonoscopy, questions about    Negative: Passing gas (flatus) after colonoscopy, questions about    Protocols used: COLONOSCOPY SYMPTOMS AND [id number]

## 2021-08-13 ENCOUNTER — HOSPITAL ENCOUNTER (OUTPATIENT)
Facility: AMBULATORY SURGERY CENTER | Age: 26
Discharge: HOME | End: 2021-08-13
Attending: INTERNAL MEDICINE | Admitting: INTERNAL MEDICINE
Payer: COMMERCIAL

## 2021-08-13 VITALS
DIASTOLIC BLOOD PRESSURE: 78 MMHG | OXYGEN SATURATION: 99 % | SYSTOLIC BLOOD PRESSURE: 132 MMHG | HEART RATE: 75 BPM | TEMPERATURE: 98.1 F | RESPIRATION RATE: 16 BRPM

## 2021-08-13 DIAGNOSIS — Z12.11 COLON CANCER SCREENING: Primary | ICD-10-CM

## 2021-08-13 LAB — COLONOSCOPY: NORMAL

## 2021-08-13 PROCEDURE — G8907 PT DOC NO EVENTS ON DISCHARG: HCPCS

## 2021-08-13 PROCEDURE — G8918 PT W/O PREOP ORDER IV AB PRO: HCPCS

## 2021-08-13 PROCEDURE — 45380 COLONOSCOPY AND BIOPSY: CPT

## 2021-08-13 PROCEDURE — 88305 TISSUE EXAM BY PATHOLOGIST: CPT | Performed by: PATHOLOGY

## 2021-08-13 RX ORDER — PROCHLORPERAZINE MALEATE 10 MG
10 TABLET ORAL EVERY 6 HOURS PRN
Status: DISCONTINUED | OUTPATIENT
Start: 2021-08-13 | End: 2021-08-14 | Stop reason: HOSPADM

## 2021-08-13 RX ORDER — ONDANSETRON 2 MG/ML
4 INJECTION INTRAMUSCULAR; INTRAVENOUS EVERY 6 HOURS PRN
Status: DISCONTINUED | OUTPATIENT
Start: 2021-08-13 | End: 2021-08-14 | Stop reason: HOSPADM

## 2021-08-13 RX ORDER — LIDOCAINE 40 MG/G
CREAM TOPICAL
Status: DISCONTINUED | OUTPATIENT
Start: 2021-08-13 | End: 2021-08-14 | Stop reason: HOSPADM

## 2021-08-13 RX ORDER — FLUMAZENIL 0.1 MG/ML
0.2 INJECTION, SOLUTION INTRAVENOUS
Status: ACTIVE | OUTPATIENT
Start: 2021-08-13 | End: 2021-08-13

## 2021-08-13 RX ORDER — NALOXONE HYDROCHLORIDE 0.4 MG/ML
0.2 INJECTION, SOLUTION INTRAMUSCULAR; INTRAVENOUS; SUBCUTANEOUS
Status: DISCONTINUED | OUTPATIENT
Start: 2021-08-13 | End: 2021-08-14 | Stop reason: HOSPADM

## 2021-08-13 RX ORDER — NALOXONE HYDROCHLORIDE 0.4 MG/ML
0.4 INJECTION, SOLUTION INTRAMUSCULAR; INTRAVENOUS; SUBCUTANEOUS
Status: DISCONTINUED | OUTPATIENT
Start: 2021-08-13 | End: 2021-08-14 | Stop reason: HOSPADM

## 2021-08-13 RX ORDER — ONDANSETRON 4 MG/1
4 TABLET, ORALLY DISINTEGRATING ORAL EVERY 6 HOURS PRN
Status: DISCONTINUED | OUTPATIENT
Start: 2021-08-13 | End: 2021-08-14 | Stop reason: HOSPADM

## 2021-08-13 RX ORDER — FENTANYL CITRATE 50 UG/ML
INJECTION, SOLUTION INTRAMUSCULAR; INTRAVENOUS PRN
Status: DISCONTINUED | OUTPATIENT
Start: 2021-08-13 | End: 2021-08-13 | Stop reason: HOSPADM

## 2021-08-13 RX ORDER — SODIUM CHLORIDE, SODIUM LACTATE, POTASSIUM CHLORIDE, CALCIUM CHLORIDE 600; 310; 30; 20 MG/100ML; MG/100ML; MG/100ML; MG/100ML
INJECTION, SOLUTION INTRAVENOUS CONTINUOUS PRN
Status: COMPLETED | OUTPATIENT
Start: 2021-08-13 | End: 2021-08-13

## 2021-08-13 RX ORDER — ONDANSETRON 2 MG/ML
4 INJECTION INTRAMUSCULAR; INTRAVENOUS
Status: COMPLETED | OUTPATIENT
Start: 2021-08-13 | End: 2021-08-13

## 2021-08-13 RX ADMIN — ONDANSETRON 4 MG: 2 INJECTION INTRAMUSCULAR; INTRAVENOUS at 10:09

## 2021-08-17 LAB
PATH REPORT.COMMENTS IMP SPEC: NORMAL
PATH REPORT.COMMENTS IMP SPEC: NORMAL
PATH REPORT.FINAL DX SPEC: NORMAL
PATH REPORT.GROSS SPEC: NORMAL
PATH REPORT.MICROSCOPIC SPEC OTHER STN: NORMAL
PATH REPORT.RELEVANT HX SPEC: NORMAL
PHOTO IMAGE: NORMAL

## 2021-10-09 ENCOUNTER — HEALTH MAINTENANCE LETTER (OUTPATIENT)
Age: 26
End: 2021-10-09

## 2022-04-24 ENCOUNTER — HOSPITAL ENCOUNTER (EMERGENCY)
Facility: CLINIC | Age: 27
Discharge: HOME | End: 2022-04-24
Attending: EMERGENCY MEDICINE | Admitting: EMERGENCY MEDICINE
Payer: COMMERCIAL

## 2022-04-24 ENCOUNTER — APPOINTMENT (OUTPATIENT)
Dept: GENERAL RADIOLOGY | Facility: CLINIC | Age: 27
End: 2022-04-24
Attending: EMERGENCY MEDICINE
Payer: COMMERCIAL

## 2022-04-24 VITALS
WEIGHT: 293 LBS | RESPIRATION RATE: 18 BRPM | BODY MASS INDEX: 43.4 KG/M2 | HEIGHT: 69 IN | OXYGEN SATURATION: 96 % | SYSTOLIC BLOOD PRESSURE: 152 MMHG | DIASTOLIC BLOOD PRESSURE: 120 MMHG | HEART RATE: 88 BPM | TEMPERATURE: 97.6 F

## 2022-04-24 DIAGNOSIS — M54.50 ACUTE LEFT-SIDED LOW BACK PAIN WITHOUT SCIATICA: ICD-10-CM

## 2022-04-24 LAB
ALBUMIN UR-MCNC: NEGATIVE MG/DL
APPEARANCE UR: ABNORMAL
BILIRUB UR QL STRIP: NEGATIVE
COLOR UR AUTO: ABNORMAL
GLUCOSE UR STRIP-MCNC: NEGATIVE MG/DL
HCG UR QL: NEGATIVE
HGB UR QL STRIP: ABNORMAL
KETONES UR STRIP-MCNC: NEGATIVE MG/DL
LEUKOCYTE ESTERASE UR QL STRIP: ABNORMAL
MUCOUS THREADS #/AREA URNS LPF: PRESENT /LPF
NITRATE UR QL: NEGATIVE
PH UR STRIP: 5.5 [PH] (ref 5–7)
RBC URINE: 2 /HPF
SP GR UR STRIP: 1.01 (ref 1–1.03)
SQUAMOUS EPITHELIAL: 3 /HPF
UROBILINOGEN UR STRIP-MCNC: NORMAL MG/DL
WBC URINE: 13 /HPF

## 2022-04-24 PROCEDURE — 72100 X-RAY EXAM L-S SPINE 2/3 VWS: CPT

## 2022-04-24 PROCEDURE — 81001 URINALYSIS AUTO W/SCOPE: CPT | Performed by: EMERGENCY MEDICINE

## 2022-04-24 PROCEDURE — 250N000013 HC RX MED GY IP 250 OP 250 PS 637: Performed by: EMERGENCY MEDICINE

## 2022-04-24 PROCEDURE — 87086 URINE CULTURE/COLONY COUNT: CPT | Performed by: EMERGENCY MEDICINE

## 2022-04-24 PROCEDURE — 96372 THER/PROPH/DIAG INJ SC/IM: CPT | Performed by: EMERGENCY MEDICINE

## 2022-04-24 PROCEDURE — 81025 URINE PREGNANCY TEST: CPT | Performed by: EMERGENCY MEDICINE

## 2022-04-24 PROCEDURE — 99284 EMERGENCY DEPT VISIT MOD MDM: CPT | Mod: 25

## 2022-04-24 PROCEDURE — 250N000011 HC RX IP 250 OP 636: Performed by: EMERGENCY MEDICINE

## 2022-04-24 RX ORDER — KETOROLAC TROMETHAMINE 15 MG/ML
30 INJECTION, SOLUTION INTRAMUSCULAR; INTRAVENOUS ONCE
Status: COMPLETED | OUTPATIENT
Start: 2022-04-24 | End: 2022-04-24

## 2022-04-24 RX ORDER — PREDNISONE 20 MG/1
TABLET ORAL
Qty: 10 TABLET | Refills: 0 | Status: SHIPPED | OUTPATIENT
Start: 2022-04-24 | End: 2022-07-12

## 2022-04-24 RX ORDER — CYCLOBENZAPRINE HCL 10 MG
10 TABLET ORAL 3 TIMES DAILY PRN
Qty: 12 TABLET | Refills: 0 | Status: SHIPPED | OUTPATIENT
Start: 2022-04-24 | End: 2022-04-30

## 2022-04-24 RX ORDER — LIDOCAINE 4 G/G
1 PATCH TOPICAL ONCE
Status: DISCONTINUED | OUTPATIENT
Start: 2022-04-24 | End: 2022-04-24 | Stop reason: HOSPADM

## 2022-04-24 RX ORDER — METHOCARBAMOL 500 MG/1
500 TABLET, FILM COATED ORAL ONCE
Status: COMPLETED | OUTPATIENT
Start: 2022-04-24 | End: 2022-04-24

## 2022-04-24 RX ADMIN — METHOCARBAMOL 500 MG: 500 TABLET ORAL at 06:11

## 2022-04-24 RX ADMIN — KETOROLAC TROMETHAMINE 30 MG: 15 INJECTION, SOLUTION INTRAMUSCULAR; INTRAVENOUS at 06:09

## 2022-04-24 RX ADMIN — LIDOCAINE 1 PATCH: 560 PATCH PERCUTANEOUS; TOPICAL; TRANSDERMAL at 06:11

## 2022-04-24 ASSESSMENT — ENCOUNTER SYMPTOMS
VOMITING: 0
DIARRHEA: 0
COUGH: 0
NAUSEA: 0
BACK PAIN: 1
SHORTNESS OF BREATH: 0
EYE REDNESS: 0
FEVER: 0
ABDOMINAL PAIN: 0
FREQUENCY: 0
DYSURIA: 0
SORE THROAT: 0

## 2022-04-24 NOTE — ED TRIAGE NOTES
Patient states bent over to  a piece of clothing.  Now having lower back pain.  Hurts to sit, stand.

## 2022-04-24 NOTE — ED PROVIDER NOTES
"  History   Chief Complaint:  Back Pain       HPI   Misty Serrano is a 27 year old female with history of back pain in the past who presents with left low back pain.  It began last night when she bent down to pick something up.  The pain does not radiate down the leg.  No urinary symptoms.  No abdominal pain.  She took Flexeril and Tylenol last night.  It helped minimally.  No numbness or tingling.  No chance of pregnancy.  She does not like to take NSAIDs as her gastroenterologist told her it can cause a flare of diverticulitis because she has underlying diverticulosis.  She has not had blood in her stool.  No trauma.     Review of Systems   Constitutional: Negative for fever.   HENT: Negative for sore throat.    Eyes: Negative for redness.   Respiratory: Negative for cough and shortness of breath.    Cardiovascular: Negative for chest pain.   Gastrointestinal: Negative for abdominal pain, diarrhea, nausea and vomiting.   Genitourinary: Negative for dysuria, frequency and urgency.   Musculoskeletal: Positive for back pain. Negative for gait problem.   Skin: Negative for rash.   All other systems reviewed and are negative.    Allergies:  The patient does not have any allergies    Medications:  Bisacodyl  Polyethylene glycol  Simethicone  Cyclobenzaprine  Hyoscyamine  Budesonide  Etonogestrel    Past Medical History:     Anxiety  Obesity  Depression  Sleep apnea    Past Surgical History:    Colonoscopy    Family History:    Mother: hypertension  Father: hypertension, diverticulitis    Social History:  Here with significant other    Physical Exam     Patient Vitals for the past 24 hrs:   BP Temp Temp src Pulse Resp SpO2 Height Weight   04/24/22 0135 (!) 163/113 97.6  F (36.4  C) Temporal 97 14 97 % 1.753 m (5' 9\") 132.9 kg (293 lb)       Physical Exam  Physical Exam   General:  Sitting on bed with significant other at bedside.   HENT:  No obvious trauma to head  Right Ear:  External ear normal.   Left Ear:  " External ear normal.   Nose:  Nose normal.   Eyes:  Conjunctivae and EOM are normal. Pupils are equal, round, and reactive.   Neck: Normal range of motion. Neck supple. No tracheal deviation present.   CV:  Normal heart sounds. No murmur heard.  Pulm/Chest: Effort normal and breath sounds normal.   Abd: Soft. No distension. There is no tenderness. There is no rigidity, no rebound and no guarding. No CVA tenderness B/L. No pulsatile abdominal mass.  M/S: Normal range of motion. Muscle strength is +5 proximal and distal in the bilateral lower extremities. Tenderness over the lower left paraspinal musculature. No midline tenderness or step off.  Neuro: Alert. GCS 15. Reflexes +2 B/L patella and achilles  Skin: Skin is warm and dry. No rash noted. Not diaphoretic. No rash of shingles on low back.  Psych: Normal mood and affect. Behavior is normal.       Emergency Department Course   Imaging:  XR Lumbar Spine 2/3 Views   Final Result   IMPRESSION: Straightening of usual lumbar curvature. Otherwise normal. No fracture. Normal vertebral heights and alignment. Normal disc spaces and facets for age. Normal extraspinal structures.        Report per radiology    Laboratory:  Labs Ordered and Resulted from Time of ED Arrival to Time of ED Departure   ROUTINE UA WITH MICROSCOPIC REFLEX TO CULTURE - Abnormal       Result Value    Color Urine Straw      Appearance Urine Slightly Cloudy (*)     Glucose Urine Negative      Bilirubin Urine Negative      Ketones Urine Negative      Specific Gravity Urine 1.010      Blood Urine Trace (*)     pH Urine 5.5      Protein Albumin Urine Negative      Urobilinogen Urine Normal      Nitrite Urine Negative      Leukocyte Esterase Urine Moderate (*)     Mucus Urine Present (*)     RBC Urine 2      WBC Urine 13 (*)     Squamous Epithelials Urine 3 (*)    HCG QUALITATIVE URINE - Normal    hCG Urine Qualitative Negative     URINE CULTURE      Emergency Department Course:  Reviewed:  I reviewed  nursing notes, vitals, past medical history and Care Everywhere    Assessments:  0605 I obtained history and examined the patient as noted above and explained findings.    Interventions:  Medications   Lidocaine (LIDOCARE) 4 % Patch 1 patch (1 patch Transdermal Patch/Med Applied 4/24/22 0611)   lidocaine patch in PLACE (has no administration in time range)   ketorolac (TORADOL) injection 30 mg (30 mg Intramuscular Given 4/24/22 0609)   methocarbamol (ROBAXIN) tablet 500 mg (500 mg Oral Given 4/24/22 0611)     Disposition:  The patient was discharged to home.     Impression & Plan   Medical Decision Making:  Misty Serrano is a very pleasant 27 year old year old patient who presents to the emergency department with concern of left low back pain.  She has a history of back pain in the past.  Pain has improved with interventions in the emergency department. The patient did not sustain any trauma, therefore x-rays are not necessary due to the low likelihood of fracture or subluxation.  No red flag symptoms to suggest CT and/or MRI is indicated at this point.  There is no clinical evidence of cauda equina syndrome, discitis, spinal/epidural space hematoma or epidural abscess or other worrisome etiology. The neurological exam is normal and the patient's symptoms seem consistent with musculoskeletal issues and significant muscle spasm.  The patient will be discharged with pain medications to use as directed. Ice or heat to the back and stretching exercises. No heavy lifting, bending or twisting. Return if increasing pain, numbness, weakness, or bowel or bladder dysfunction. She was advised to schedule follow-up with her primary doctor within 3 days to re-assess symptoms.     The treatment plan was discussed with the patient and they expressed understanding of this plan and consented to the plan.  In addition, the patient will return to the emergency department if their symptoms persist, worsen, if new symptoms arise or  if there is any concern as other pathology may be present that is not evident at this time. They also understand the importance of close follow up in the clinic and if unable to do so will return to the emergency department for a reevaluation. All questions were answered.    Diagnosis:    ICD-10-CM    1. Acute left-sided low back pain without sciatica  M54.50        Discharge Medications:  New Prescriptions    CYCLOBENZAPRINE (FLEXERIL) 10 MG TABLET    Take 1 tablet (10 mg) by mouth 3 times daily as needed for muscle spasms    PREDNISONE (DELTASONE) 20 MG TABLET    Take two tablets (= 40mg) each day for 5 (five) days       Scribe Disclosure:  I, Kj Hooks, am serving as a scribe at 6:01 AM on 4/24/2022 to document services personally performed by Virgilio Umanzor DO based on my observations and the provider's statements to me.         Virgilio Umanzor DO  04/24/22 0696

## 2022-04-25 LAB — BACTERIA UR CULT: NORMAL

## 2022-04-26 ENCOUNTER — VIRTUAL VISIT (OUTPATIENT)
Dept: INTERNAL MEDICINE | Facility: CLINIC | Age: 27
End: 2022-04-26
Payer: COMMERCIAL

## 2022-04-26 DIAGNOSIS — M54.50 ACUTE LEFT-SIDED LOW BACK PAIN WITHOUT SCIATICA: Primary | ICD-10-CM

## 2022-04-26 PROCEDURE — 99213 OFFICE O/P EST LOW 20 MIN: CPT | Mod: 95 | Performed by: INTERNAL MEDICINE

## 2022-04-26 ASSESSMENT — PATIENT HEALTH QUESTIONNAIRE - PHQ9
SUM OF ALL RESPONSES TO PHQ QUESTIONS 1-9: 7
SUM OF ALL RESPONSES TO PHQ QUESTIONS 1-9: 7
10. IF YOU CHECKED OFF ANY PROBLEMS, HOW DIFFICULT HAVE THESE PROBLEMS MADE IT FOR YOU TO DO YOUR WORK, TAKE CARE OF THINGS AT HOME, OR GET ALONG WITH OTHER PEOPLE: NOT DIFFICULT AT ALL

## 2022-04-26 NOTE — PROGRESS NOTES
VIDEO VISIT                                                       ASSESSMENT/PLAN                                                      (M54.50) Acute left-sided low back pain without sciatica  (primary encounter diagnosis)  Comment: significantly improved since ER visit.  Plan: continue and complete course of prednisone; continue Flexeril as needed; continue heating pad and foam roller exercises for additional pain relief; if symptoms worsen, change, or do not improve, patient to contact MD.      Total time of video call between patient and provider was 4 minutes (2:29-2:33pm). Provider location: office. Patient location: home. Platform: [email or web address].     Melody Echevarria MD   [facility] [employer]  [address] [place] [id number]  T: [phone], F: [phone]    SUBJECTIVE                                                      Misty Serrano is a very pleasant 27 year old female who requested a video visit to discuss her recent ER visit:    Patient was made aware that this visit will be billed the same as an in-person visit and has given verbal consent to proceed with this video visit.     Patient was seen in the ER 4/24/2022 with left lower back pain. Prescribed an oral steroid burst and Flexeril to be used as needed.  Patient reports that her symptoms are significantly improved (~50%) since her ER visit. She has also been using a foam roller and heating pad with additional relief of symptoms.    No other concerns or questions today.    OBJECTIVE                                                       Vitals: No vitals were obtained today due to virtual visit.    General: appears healthy, alert and no distress  Psychiatric: mentation normal, affect normal/bright, judgement and insight intact, normal speech and appearance well-groomed    ---    (Note was completed, in part, with [employer] voice-recognition software. Documentation reviewed, but some grammatical, spelling, and word errors may  remain.)

## 2022-04-27 ASSESSMENT — PATIENT HEALTH QUESTIONNAIRE - PHQ9: SUM OF ALL RESPONSES TO PHQ QUESTIONS 1-9: 7

## 2022-05-06 ENCOUNTER — MYC REFILL (OUTPATIENT)
Dept: INTERNAL MEDICINE | Facility: CLINIC | Age: 27
End: 2022-05-06
Payer: COMMERCIAL

## 2022-05-06 DIAGNOSIS — R10.9 ABDOMINAL CRAMPING: ICD-10-CM

## 2022-05-06 RX ORDER — HYOSCYAMINE SULFATE 0.125 MG
0.12 TABLET ORAL EVERY 6 HOURS PRN
Qty: 30 TABLET | Refills: 0 | Status: SHIPPED | OUTPATIENT
Start: 2022-05-06 | End: 2022-07-12

## 2022-05-21 ENCOUNTER — HEALTH MAINTENANCE LETTER (OUTPATIENT)
Age: 27
End: 2022-05-21

## 2022-07-05 ASSESSMENT — ENCOUNTER SYMPTOMS
EYE PAIN: 0
JOINT SWELLING: 0
SHORTNESS OF BREATH: 0
HEADACHES: 1
FREQUENCY: 0
HEMATURIA: 0
DYSURIA: 0
PALPITATIONS: 0
FEVER: 0
CHILLS: 0
HEARTBURN: 0
DIARRHEA: 0
ARTHRALGIAS: 0
BREAST MASS: 0
DIZZINESS: 0
HEMATOCHEZIA: 0
NAUSEA: 0
CONSTIPATION: 0
COUGH: 0
WEAKNESS: 0
ABDOMINAL PAIN: 0
SORE THROAT: 0
MYALGIAS: 0
PARESTHESIAS: 0
NERVOUS/ANXIOUS: 0

## 2022-07-05 ASSESSMENT — PATIENT HEALTH QUESTIONNAIRE - PHQ9
SUM OF ALL RESPONSES TO PHQ QUESTIONS 1-9: 14
SUM OF ALL RESPONSES TO PHQ QUESTIONS 1-9: 14
10. IF YOU CHECKED OFF ANY PROBLEMS, HOW DIFFICULT HAVE THESE PROBLEMS MADE IT FOR YOU TO DO YOUR WORK, TAKE CARE OF THINGS AT HOME, OR GET ALONG WITH OTHER PEOPLE: SOMEWHAT DIFFICULT

## 2022-07-11 PROBLEM — Z87.19 HISTORY OF DIVERTICULITIS: Status: ACTIVE | Noted: 2022-07-11

## 2022-07-12 ENCOUNTER — OFFICE VISIT (OUTPATIENT)
Dept: INTERNAL MEDICINE | Facility: CLINIC | Age: 27
End: 2022-07-12
Payer: COMMERCIAL

## 2022-07-12 VITALS
BODY MASS INDEX: 43.4 KG/M2 | WEIGHT: 293 LBS | SYSTOLIC BLOOD PRESSURE: 150 MMHG | RESPIRATION RATE: 18 BRPM | TEMPERATURE: 98.3 F | DIASTOLIC BLOOD PRESSURE: 100 MMHG | HEART RATE: 96 BPM | OXYGEN SATURATION: 99 % | HEIGHT: 69 IN

## 2022-07-12 DIAGNOSIS — D18.00 VENOUS ANGIOMA: ICD-10-CM

## 2022-07-12 DIAGNOSIS — E66.01 MORBID OBESITY (H): ICD-10-CM

## 2022-07-12 DIAGNOSIS — R06.83 SNORING: ICD-10-CM

## 2022-07-12 DIAGNOSIS — Z13.220 SCREENING FOR CHOLESTEROL LEVEL: ICD-10-CM

## 2022-07-12 DIAGNOSIS — R73.01 IMPAIRED FASTING GLUCOSE: ICD-10-CM

## 2022-07-12 DIAGNOSIS — Z00.01 ENCOUNTER FOR ROUTINE ADULT MEDICAL EXAM WITH ABNORMAL FINDINGS: Primary | ICD-10-CM

## 2022-07-12 DIAGNOSIS — R53.82 CHRONIC FATIGUE: ICD-10-CM

## 2022-07-12 DIAGNOSIS — I10 UNCONTROLLED HYPERTENSION: ICD-10-CM

## 2022-07-12 DIAGNOSIS — R40.0 DAYTIME SOMNOLENCE: ICD-10-CM

## 2022-07-12 LAB
ERYTHROCYTE [DISTWIDTH] IN BLOOD BY AUTOMATED COUNT: 14.4 % (ref 10–15)
HBA1C MFR BLD: 5.9 % (ref 0–5.6)
HCT VFR BLD AUTO: 42.2 % (ref 35–47)
HGB BLD-MCNC: 13.7 G/DL (ref 11.7–15.7)
MCH RBC QN AUTO: 28 PG (ref 26.5–33)
MCHC RBC AUTO-ENTMCNC: 32.5 G/DL (ref 31.5–36.5)
MCV RBC AUTO: 86 FL (ref 78–100)
PLATELET # BLD AUTO: 288 10E3/UL (ref 150–450)
RBC # BLD AUTO: 4.9 10E6/UL (ref 3.8–5.2)
WBC # BLD AUTO: 12.3 10E3/UL (ref 4–11)

## 2022-07-12 PROCEDURE — 99395 PREV VISIT EST AGE 18-39: CPT | Performed by: INTERNAL MEDICINE

## 2022-07-12 PROCEDURE — 80061 LIPID PANEL: CPT | Performed by: INTERNAL MEDICINE

## 2022-07-12 PROCEDURE — 85027 COMPLETE CBC AUTOMATED: CPT | Performed by: INTERNAL MEDICINE

## 2022-07-12 PROCEDURE — 99214 OFFICE O/P EST MOD 30 MIN: CPT | Mod: 25 | Performed by: INTERNAL MEDICINE

## 2022-07-12 PROCEDURE — 93000 ELECTROCARDIOGRAM COMPLETE: CPT | Performed by: INTERNAL MEDICINE

## 2022-07-12 PROCEDURE — 84443 ASSAY THYROID STIM HORMONE: CPT | Performed by: INTERNAL MEDICINE

## 2022-07-12 PROCEDURE — 82306 VITAMIN D 25 HYDROXY: CPT | Performed by: INTERNAL MEDICINE

## 2022-07-12 PROCEDURE — 36415 COLL VENOUS BLD VENIPUNCTURE: CPT | Performed by: INTERNAL MEDICINE

## 2022-07-12 PROCEDURE — 82728 ASSAY OF FERRITIN: CPT | Performed by: INTERNAL MEDICINE

## 2022-07-12 PROCEDURE — 83036 HEMOGLOBIN GLYCOSYLATED A1C: CPT | Performed by: INTERNAL MEDICINE

## 2022-07-12 PROCEDURE — 83550 IRON BINDING TEST: CPT | Performed by: INTERNAL MEDICINE

## 2022-07-12 PROCEDURE — 80053 COMPREHEN METABOLIC PANEL: CPT | Performed by: INTERNAL MEDICINE

## 2022-07-12 RX ORDER — AMLODIPINE AND VALSARTAN 5; 160 MG/1; MG/1
1 TABLET ORAL DAILY
Qty: 90 TABLET | Refills: 1 | Status: SHIPPED | OUTPATIENT
Start: 2022-07-12 | End: 2023-01-02

## 2022-07-12 NOTE — PATIENT INSTRUCTIONS
EKG today.    ---    Please schedule blood pressure follow-up with me for 2-4 weeks from now (at the  on the way out).    ---    Labs today.    ---    For suspected venous angioma, please call:     [facility]   [address] [place] [address]   Phone: [phone]     ---    You will be contacted to schedule a sleep evaluation. If you don't hear from a representative within 2 business days, please call [phone].    ---    Please START amlodipine-valsartan 5-160mg once daily for high blood pressure.

## 2022-07-12 NOTE — PROGRESS NOTES
ASSESSMENT/PLAN                                                       (Z00.01) Encounter for routine adult medical exam with abnormal findings  (primary encounter diagnosis)  Comment: PMH, PSH, FH, SH, medications, allergies, immunizations, and preventative health measures reviewed and updated as appropriate.  Plan: see below for plans.      (R53.82) Chronic fatigue  (R06.83) Snoring  (R40.0) Daytime somnolence  (E66.01) Morbid obesity (H)  Comment: suspect that chronic fatigue is related to undiagnosed/untreated BRENDEN but will evaluate for other up potential organic causes of/contributors to fatigue.  Plan: nonfasting labs today; sleep referral placed - patient will be contacted to schedule.      (I10) Uncontrolled hypertension  Comment: asymptomatic; no prior treatments.  Plan: EKG today;  START amlodipine-valsartan 5-160 mg daily; blood pressure follow-up and BMP in 2-4 weeks.     (Z13.220) Screening for cholesterol level  (R73.01) Impaired fasting glucose  Plan: Nonfasting labs today.    (D18.00) Venous angioma  Comment: left upper inner arm; suspect lesion is too large and vascular for dermatology to remove.  Plan: vascular surgery referral placed - patient will be contacted to schedule.      Melody Echevarria MD   [address] [address]  T: [phone], F: [phone]    JAI Serrano is a very pleasant 27 year old female who presents for a physical.    Patient's blood pressure is noted to be elevated today, even on repeat.  She has a history of elevated blood pressures in the past but has never been on treatment. She is asymptomatic from a blood pressure perspective: no chest pain or palpitations, no shortness of breath, no light-headedness or dizziness, no headaches or vision changes.     Patient does complain of chronic fatigue. Ongoing for as long as she can remember.  Patient is morbidly obese  (BMI 44) and has been told that she snores.    Finally, patient desires treatment for high venous angioma left upper, inner arm.  Asymptomatic but cosmetically displeasing.    ROS:  Constitutional: no unintentional weight loss or gain reported; no fevers, chills, or sweats reported  Cardiovascular: no chest pain, palpitations, or edema reported  Respiratory: no cough, wheezing, shortness of breath, or dyspnea on exertion reported  Gastrointestinal: no nausea, vomiting, constipation, diarrhea, or abdominal pain reported  Genitourinary: no urinary frequency, urgency, dysuria, or hematuria reported  Integumentary: no rash or pruritus reported  Musculoskeletal: no back pain, muscle pain, joint pain, or joint swelling reported  Neurologic: no focal weakness, numbness, or tingling reported  Hematologic: no easy bruising or bleeding reported  Endocrine: no heat or cold intolerance reported; no polyuria or polydipsia reported  Psychiatric: see PMH below    Past Medical History:   Diagnosis Date     FRANCES (generalized anxiety disorder)      History of diverticulitis     recurrent     Morbid obesity (H)      Persistent depressive disorder      Past Surgical History:   Procedure Laterality Date     COLONOSCOPY N/A 8/13/2021    Procedure: Colonoscopy, With Polypectomy And Biopsy;  Surgeon: Tejal Jarrett DO;  Location: [facility] OR     COLONOSCOPY WITH CO2 INSUFFLATION N/A 8/13/2021    Procedure: COLONOSCOPY, WITH CO2 INSUFFLATION;  Surgeon: Tejal Jarrett DO;  Location: [facility] OR     NO HISTORY OF SURGERY       Family History   Problem Relation Age of Onset     Hypertension Mother      Hypertension Father      Diabetes Type 2  Maternal Grandfather      Coronary Artery Disease Maternal Grandfather         s/p CABG later in life     Prostate Cancer Maternal Grandfather      Skin Cancer Maternal Grandfather      Diabetes Type 2  Paternal Grandfather      Coronary Artery Disease Paternal Grandfather         s/p CABG later in life      Myocardial Infarction No family hx of      Cerebrovascular Disease No family hx of      Coronary Artery Disease Early Onset No family hx of      Breast Cancer No family hx of      Colon Cancer No family hx of      Ovarian Cancer No family hx of      Social History     Occupational History     Occupation: [employer]   Tobacco Use     Smoking status: Never Smoker     Smokeless tobacco: Never Used   Vaping Use     Vaping Use: Never used   Substance and Sexual Activity     Alcohol use: Not Currently     Drug use: Never     Sexual activity: Yes     Birth control/protection: Implant     Comment: Nexplanon - placed 2020   Social History Narrative    .    No kids.    No formal exercise.      No Known Allergies     Immunization History   Administered Date(s) Administered     COVID-19,PF,Pfizer (12+ Yrs) 04/19/2021, 05/10/2021, 01/14/2022     DTaP, Unspecified 05/08/2006     HPV9 07/22/2020     HepB, Unspecified 1994     Hib, Unspecified 1994, 1994, 1994     Historical DTP/aP 1994, 1994, 1994, 09/14/1995, 03/08/1999     MMR 06/02/1995, 03/08/1999     Polio, Unspecified  1994, 1994, 09/14/1995, 03/08/1999     Td (Adult), Adsorbed 07/22/2020     Td, Absorbed, Pf, Adult, Lf Unspecified 07/22/2020     Tdap (Adacel,Boostrix) 05/08/2006     Varicella 06/02/1995     PREVENTATIVE HEALTH                                                      BMI: morbidly obese  Blood pressure: elevated - not on medication  Breast CA screening: not medically indicated at this time   Cervical CA screening: up to date   Colon CA screening: not medically indicated at this time   Lung CA screening: n/a   Dexa: not medically indicated at this time   Screening cholesterol: DUE  Screening diabetes: DUE  STD testing: no risk factors present  Alcohol misuse screening: alcohol use reviewed - no intervention indicated at this time  Immunizations: reviewed; up to date     OBJECTIVE                     "                                  BP (!) 150/100   Pulse 96   Temp 98.3  F (36.8  C) (Tympanic)   Resp 18   Ht 1.753 m (5' 9\")   Wt 135.6 kg (298 lb 14.4 oz)   SpO2 99%   BMI 44.14 kg/m    Constitutional: well-appearing  Head, Ears, and Eyes: normocephalic; normal external auditory canal and pinna; tympanic membranes visualized and normal; normal lids and conjunctivae  Neck: supple, symmetric, no thyromegaly or lymphadenopathy  Respiratory: normal respiratory effort; clear to auscultation bilaterally  Cardiovascular: regular rate and rhythm; no edema  Gastrointestinal: soft, non-tender, and non-distended; no organomegaly or masses  Musculoskeletal: normal gait and station  Psych: normal judgment and insight; normal mood and affect; recent and remote memory intact    ---  (Note was completed, in part, with [employer] voice-recognition software. Documentation was reviewed, but some grammatical, spelling, and word errors may remain.)    "

## 2022-07-13 LAB — DEPRECATED CALCIDIOL+CALCIFEROL SERPL-MC: 21 UG/L (ref 20–75)

## 2022-07-14 LAB
ALBUMIN SERPL-MCNC: 3.7 G/DL (ref 3.4–5)
ALP SERPL-CCNC: 104 U/L (ref 40–150)
ALT SERPL W P-5'-P-CCNC: 36 U/L (ref 0–50)
ANION GAP SERPL CALCULATED.3IONS-SCNC: 7 MMOL/L (ref 3–14)
AST SERPL W P-5'-P-CCNC: 18 U/L (ref 0–45)
BILIRUB SERPL-MCNC: 0.3 MG/DL (ref 0.2–1.3)
BUN SERPL-MCNC: 13 MG/DL (ref 7–30)
CALCIUM SERPL-MCNC: 9.2 MG/DL (ref 8.5–10.1)
CHLORIDE BLD-SCNC: 105 MMOL/L (ref 94–109)
CHOLEST SERPL-MCNC: 159 MG/DL
CO2 SERPL-SCNC: 23 MMOL/L (ref 20–32)
CREAT SERPL-MCNC: 0.62 MG/DL (ref 0.52–1.04)
FASTING STATUS PATIENT QL REPORTED: NO
FERRITIN SERPL-MCNC: 141 NG/ML (ref 12–150)
GFR SERPL CREATININE-BSD FRML MDRD: >90 ML/MIN/1.73M2
GLUCOSE BLD-MCNC: 84 MG/DL (ref 70–99)
HDLC SERPL-MCNC: 37 MG/DL
IRON SATN MFR SERPL: 14 % (ref 15–46)
IRON SERPL-MCNC: 53 UG/DL (ref 35–180)
LDLC SERPL CALC-MCNC: 101 MG/DL
NONHDLC SERPL-MCNC: 122 MG/DL
POTASSIUM BLD-SCNC: 4 MMOL/L (ref 3.4–5.3)
PROT SERPL-MCNC: 7.8 G/DL (ref 6.8–8.8)
SODIUM SERPL-SCNC: 135 MMOL/L (ref 133–144)
TIBC SERPL-MCNC: 389 UG/DL (ref 240–430)
TRIGL SERPL-MCNC: 105 MG/DL
TSH SERPL DL<=0.005 MIU/L-ACNC: 1.56 MU/L (ref 0.4–4)

## 2022-07-15 ENCOUNTER — TELEPHONE (OUTPATIENT)
Dept: OTHER | Facility: CLINIC | Age: 27
End: 2022-07-15

## 2022-07-15 NOTE — TELEPHONE ENCOUNTER
Pt referred to [facility] by Melody Echevarria MD for Venous angioma    Discussed this referral with Dr. Raza and we do not see these at [facility] as it is a vascular malformation. Referral needs to be sent to Dr. Thuy Mcdonnell ([facility] [place] [facility]) [phone]     Tawny MEEKS, RN    [facility]  Office: [phone]  Fax: [phone]

## 2022-07-18 DIAGNOSIS — D18.00 VENOUS ANGIOMA: Primary | ICD-10-CM

## 2022-07-19 ENCOUNTER — TELEPHONE (OUTPATIENT)
Dept: RADIOLOGY | Facility: CLINIC | Age: 27
End: 2022-07-19

## 2022-07-19 NOTE — TELEPHONE ENCOUNTER
I called and left a voicemail including my call back number.  I called about referral for left arm vascular malformation for Dr Gross.  SHANA Morales, RN, BSN  Interventional Radiology Nurse Coordinator   Phone:  [phone]

## 2022-07-20 NOTE — TELEPHONE ENCOUNTER
I called and spoke with Suki, she has noticed a bump on the outer part left arm by elbow, she has always had it.  It is bluish purple.  She says it size about a dime or smaller.  Never had bleeding episodes.  She does have some faded birthmarks on her neck, mostly faded, a few visible spots.  No family hx of vascular malformations.  She has not had any imaging done of her arm, she is worried that she will bleed out if it gets cut.  Suki will send in photo's through [employer].  Will review with Dr Gross and get appropriate imaging and clinic.  SHANA Morales, RN, BSN  Interventional Radiology Nurse Coordinator   Phone:  [phone]

## 2022-07-21 ENCOUNTER — MYC MEDICAL ADVICE (OUTPATIENT)
Dept: GASTROENTEROLOGY | Facility: CLINIC | Age: 27
End: 2022-07-21

## 2022-07-21 ENCOUNTER — TELEPHONE (OUTPATIENT)
Dept: RADIOLOGY | Facility: CLINIC | Age: 27
End: 2022-07-21

## 2022-07-21 DIAGNOSIS — Q27.9 VASCULAR MALFORMATION: Primary | ICD-10-CM

## 2022-07-21 NOTE — TELEPHONE ENCOUNTER
Replied to [employer] message.   Forwarding to provider for review/recommendations.     Tejal Alvarado RN

## 2022-07-21 NOTE — TELEPHONE ENCOUNTER
Replied to [employer] message asking for additional information.    Tejal Alvarado RN

## 2022-07-22 NOTE — TELEPHONE ENCOUNTER
Tejal Jarrett, DO  You 22 hours ago (12:56 PM)     She can either follow-up with me or her PCP - if continuing to do well follow-up with PCP is fine.   Thanks      [employer] message sent to patient with above provider response.     Tejal Alvarado RN

## 2022-07-26 ENCOUNTER — TELEPHONE (OUTPATIENT)
Dept: DERMATOLOGY | Facility: CLINIC | Age: 27
End: 2022-07-26

## 2022-07-26 NOTE — TELEPHONE ENCOUNTER
Attempted to schedule patient with Dr. Gross and Dr. Biswas for September/October [facility], no answer, left message with direct number.

## 2022-07-29 ENCOUNTER — OFFICE VISIT (OUTPATIENT)
Dept: INTERNAL MEDICINE | Facility: CLINIC | Age: 27
End: 2022-07-29
Payer: COMMERCIAL

## 2022-07-29 VITALS
HEIGHT: 69 IN | OXYGEN SATURATION: 100 % | TEMPERATURE: 98 F | DIASTOLIC BLOOD PRESSURE: 88 MMHG | BODY MASS INDEX: 43.34 KG/M2 | SYSTOLIC BLOOD PRESSURE: 126 MMHG | HEART RATE: 86 BPM | WEIGHT: 292.6 LBS

## 2022-07-29 DIAGNOSIS — I10 UNCONTROLLED HYPERTENSION: Primary | ICD-10-CM

## 2022-07-29 LAB
ANION GAP SERPL CALCULATED.3IONS-SCNC: 9 MMOL/L (ref 3–14)
BUN SERPL-MCNC: 11 MG/DL (ref 7–30)
CALCIUM SERPL-MCNC: 9.6 MG/DL (ref 8.5–10.1)
CHLORIDE BLD-SCNC: 104 MMOL/L (ref 94–109)
CO2 SERPL-SCNC: 25 MMOL/L (ref 20–32)
CREAT SERPL-MCNC: 0.57 MG/DL (ref 0.52–1.04)
GFR SERPL CREATININE-BSD FRML MDRD: >90 ML/MIN/1.73M2
GLUCOSE BLD-MCNC: 95 MG/DL (ref 70–99)
POTASSIUM BLD-SCNC: 4 MMOL/L (ref 3.4–5.3)
SODIUM SERPL-SCNC: 138 MMOL/L (ref 133–144)

## 2022-07-29 PROCEDURE — 99213 OFFICE O/P EST LOW 20 MIN: CPT | Performed by: INTERNAL MEDICINE

## 2022-07-29 PROCEDURE — 80048 BASIC METABOLIC PNL TOTAL CA: CPT | Performed by: INTERNAL MEDICINE

## 2022-07-29 PROCEDURE — 36415 COLL VENOUS BLD VENIPUNCTURE: CPT | Performed by: INTERNAL MEDICINE

## 2022-07-29 NOTE — PROGRESS NOTES
"  ASSESSMENT/PLAN                                                      (I10) Uncontrolled hypertension  (primary encounter diagnosis)  Comment: well-controlled on current regimen.    Plan: BMP today; provided this is within normal limits, continue present management.    Melody Echevarria MD   [address] [address]  T: [phone], F: [phone]    SUBJECTIVE                                                      Misty Serrano is a very pleasant 27 year old female who presents for blood pressure follow-up:    Patient was started on amlodipine-valsartan 5-160 mg daily at last visit due to significantly elevated blood pressure. Tolerating medication(s) well - no adverse side effects.  Blood pressure significantly improved and within normal limits now.    OBJECTIVE                                                      BP (!) 141/94 (BP Location: Left arm, Cuff Size: Adult Large)   Pulse 86   Temp 98  F (36.7  C) (Oral)   Ht 1.753 m (5' 9\")   Wt 132.7 kg (292 lb 9.6 oz)   SpO2 100%   BMI 43.21 kg/m    Constitutional: well-appearing  Psych: normal judgment and insight; normal mood and affect; recent and remote memory intact    ---    (Note documentation was completed, in part, with [employer] voice-recognition software. Documentation was reviewed, but some grammatical, spelling, and word errors may remain.)    "

## 2022-08-19 DIAGNOSIS — F40.240 CLAUSTROPHOBIA: Primary | ICD-10-CM

## 2022-08-19 DIAGNOSIS — Q27.9 VASCULAR MALFORMATION: ICD-10-CM

## 2022-08-19 RX ORDER — DIAZEPAM 5 MG
5 TABLET ORAL
Qty: 1 TABLET | Refills: 0 | Status: SHIPPED | OUTPATIENT
Start: 2022-08-19 | End: 2023-07-14

## 2022-08-24 ENCOUNTER — MYC MEDICAL ADVICE (OUTPATIENT)
Dept: INTERNAL MEDICINE | Facility: CLINIC | Age: 27
End: 2022-08-24

## 2022-08-24 DIAGNOSIS — R10.9 ABDOMINAL CRAMPING: ICD-10-CM

## 2022-08-25 RX ORDER — HYOSCYAMINE SULFATE 0.125 MG
0.12 TABLET ORAL EVERY 6 HOURS PRN
Qty: 30 TABLET | Refills: 0 | Status: SHIPPED | OUTPATIENT
Start: 2022-08-25 | End: 2022-09-29

## 2022-09-11 ENCOUNTER — HEALTH MAINTENANCE LETTER (OUTPATIENT)
Age: 27
End: 2022-09-11

## 2022-09-16 ENCOUNTER — ANCILLARY PROCEDURE (OUTPATIENT)
Dept: MRI IMAGING | Facility: CLINIC | Age: 27
End: 2022-09-16
Attending: RADIOLOGY
Payer: COMMERCIAL

## 2022-09-16 PROCEDURE — A9585 GADOBUTROL INJECTION: HCPCS | Performed by: RADIOLOGY

## 2022-09-16 PROCEDURE — 73220 MRI UPPR EXTREMITY W/O&W/DYE: CPT | Mod: LT | Performed by: RADIOLOGY

## 2022-09-16 RX ORDER — GADOBUTROL 604.72 MG/ML
15 INJECTION INTRAVENOUS ONCE
Status: COMPLETED | OUTPATIENT
Start: 2022-09-16 | End: 2022-09-16

## 2022-09-16 RX ADMIN — GADOBUTROL 13 ML: 604.72 INJECTION INTRAVENOUS at 18:49

## 2022-10-05 ENCOUNTER — OFFICE VISIT (OUTPATIENT)
Dept: DERMATOLOGY | Facility: CLINIC | Age: 27
End: 2022-10-05
Attending: DERMATOLOGY
Payer: COMMERCIAL

## 2022-10-05 VITALS — WEIGHT: 288.58 LBS | BODY MASS INDEX: 42.62 KG/M2

## 2022-10-05 DIAGNOSIS — Q27.8: Primary | ICD-10-CM

## 2022-10-05 PROCEDURE — G0463 HOSPITAL OUTPT CLINIC VISIT: HCPCS

## 2022-10-05 PROCEDURE — 99204 OFFICE O/P NEW MOD 45 MIN: CPT | Performed by: DERMATOLOGY

## 2022-10-05 NOTE — NURSING NOTE
"[facility] [[id number]]  Chief Complaint   Patient presents with     Consult     Left arm vascular malformation     Initial Wt 288 lb 9.3 oz (130.9 kg)   BMI 42.62 kg/m   Estimated body mass index is 42.62 kg/m  as calculated from the following:    Height as of 7/29/22: 5' 9\" (175.3 cm).    Weight as of this encounter: 288 lb 9.3 oz (130.9 kg).  Medication Reconciliation: complete    Shamar Royal, EMT        "

## 2022-10-05 NOTE — LETTER
10/5/2022      RE: Misty Serrano  [address]  [facility] [address]     Dear Colleague,    Thank you for the opportunity to participate in the care of your patient, Misty Serrano, at the [facility] at [facility]. Please see a copy of my visit note below.    [facility] Vascular Lesions Clinic  New Patient Consultation      CHIEF COMPLAINT:Blue patches on the skin    Dear Dr. Echevarria     We had the pleasure of seeing your patient, Misty Serrano in our multidisciplinary Vascular Lesions Clinic here at the [facility]. This specialized clinic offers interdisciplinary evaluation for patients with complex vascular anomalies. Multiple specialists were present in our evaluation today including Dr.'s Fernando Biswas, Emmanuelle Rivera and Clara Dale from Pediatric Dermatology, Dr. Thuy Gross from Pediatric Interventional Radiology, Dr. Hima Love, Pediatric Radiology, Dr. Carol Perkins and SIOMARA Beatty, Pediatric Hematology/Oncology, Dr. Yosi Diaz, Pediatric ENT, Dr. Una Bermudez  Plastic Surgery, and Dr. Kassidy Chauhan, OB/Gyn.      HISTORY OF PRESENT ILLNESS:Suki was seen by our multisiciplinary clinic today for several blue spots on her skin. She reports that these spots, particularly one on the left inner upper arm have been present since birth. They seem to be growing with her, though she wonders if she's getting a few more spots over time. The lesions are asymptomatic unless they get hit or bumped, especially the one on her arm. She is establishing care today to find out what these are and if they are harmful to her. Suki denies any other family members having similar lesions.     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 She is  and plans to have children one day.     REVIEW OF SYSTEMS: A 10-point review of systems was noncontributory.  Suki denies fevers, chills, weight loss, fatigue, chest pain, shortness of breath, abdominal symptoms, nausea, vomiting, diarrhea, constipation, genitourinary, or musculoskeletal complaints.     MEDICATIONS:  Current Outpatient Medications   Medication     amLODIPine-valsartan (EXFORGE) 5-160 MG tablet     Etonogestrel (NEXPLANON SC)     hyoscyamine (LEVSIN) 0.125 MG tablet     diazepam (VALIUM) 5 MG tablet     No current facility-administered medications for this visit.         ALLERGIES:NKDA    PHYSICAL EXAMINATION:  VITALS: Wt 130.9 kg (288 lb 9.3 oz)   BMI 42.62 kg/m        GENERAL:Well-appearing, well-nourished in no acute distress.  HEAD: Normocephalic, non-dysmorphic.   EYES: Clear. Conjunctiva normal.  NECK: Supple.  RESPIRATORY: Patient is breathing comfortably in room air.   CARDIOVASCULAR: Well perfused in all extremities. No peripheral edema.   ABDOMEN: Nondistended.   EXTREMITIES: No clubbing or cyanosis. Nails normal.  SKIN: Full-body skin exam including inspection and palpation of the skin and subcutaneous tissues of the scalp, face, neck, chest, abdomen, back, bilateral upper extremities, bilateral lower extremities, buttocks and genitalia was completed today. Exam notable for:  A well appearing 27 year old woman, type I skin. On the left inner arm there is a 1.5 cm firm, rubbery blue hued nodule                    Imaging was discussed and interpreted at length with our group today at the 8:00AM meeting    MR left arm  without contrast 9/16/2022 6:52 PM     Techniques: Multiplanar multisequence imaging of the left arm was  obtained without and with administration of intravenous contrast  using  routing protocol. Contrast: 13.0mL Gadavist     History: Area of interest - upper extremity, upper arm/lower arm;  Vascular malformation     Additional History from EMR: she has noticed a bump on the outer part  left arm by elbow, she has always had it. ?It is bluish purple. ?She  says it size about a dime or smaller. ?Never had bleeding episodes.  ?She does have some faded birthmarks on her neck, mostly faded, a few  visible spots. ?No family hx of vascular malformations. ?She has not  had any imaging done of her arm, she is worried that she will bleed  out if it gets cut.     Comparison: None     Findings: Images degraded by motion.     Marker over the anteromedial left upper extremity at the level of the  antecubital fossa.     In proximity to the marker, there is a T2 hyperintense, T1 isointense  to skeletal muscle, lobular enhancing focus measuring 1.4 x 1.5 x 1.5  cm. Question small adjacent linear vascular structure on series 11  images 10-11.     Osseous structures  Osseous structures: No fracture, stress reaction, avascular necrosis,  or focal osseous lesion is seen.     Joint and periarticular soft tissue     Internal derangement of joints are not well assessed owing to chosen  field of view.       Muscles and tendons  Muscles: Visualized muscles are unremarkable without evidence of  muscle strain, atrophy or mass.      Tendons: The visualized tendons are intact.     Nerves:  Normal visualized course of the ulnar nerve.     Other Findings:  None.                                                                      Impression:     Corresponding to patient marker, there is a lobular T2 hyperintense,  enhancing focus within the subcutaneous fat extending to the skin  surface measuring up to 1.5 cm. Question small adjacent vessel on  axial image 11. MR appearance compatible with clinical history  vascular malformation.     CEDRIC ROBBINS MD (Joe)          IMPRESSION AND PLAN:    Suki has  multifocal, superficial blue papules and nodules on several areas of the body, the largest is on her left upper inner arm. 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 GVMs are comprised of glomus cells and have a typical appearance both clinically and histopathologically. They tend to be multifocal and are a result of mutations in the glomulin gene. This is an autosomal dominant condition and genetic testing is available to confirm this should the family desire. We discussed this with Suki and she deferred referral to genetics at this time, we encouraged her to follow up with genetics in the future, ines  We discussed the natural history and expected clinical course for glomuvenous malformations, including their tendency to persist lifelong and that the patient may acquire more over time. However they are completely benign and not related to any increased risk for malignancy. Treatment depends on symptoms versus cosmetic concern and could include laser therapy (Nd:YAG) surgical excision or sclerotherapy depending on the situation. These possible options were discussed briefly with Suki and could be considered in the future at any time.     Follow up in 2-3 years or sooner prn.    Thank you for involving us in the care of your patient.    Sincerely,     Fernando Biswas MD  , Dermatology & Pediatrics  , Pediatric Dermatology  Director, Vascular Anomalies Center, [facility]  Faculty Advisor    [facility]  Explorer Clinic, 12th Floor  [facility] [place] [address] [phone] (clinic phone)  [phone] (fax)

## 2022-10-05 NOTE — PATIENT INSTRUCTIONS
VASCULAR ANOMALIES CLINIC, [facility]- 3rd Floor     Today you were seen in our Pediatric Vascular Lesions Clinic by one or several of the Physicians listed below:    Dr. Clara Dale and Dr. Fernando Biswas, Dr. Emmanuelle Rivera & Dr. Genoveva Doyle (Pediatric Dermatology) #[phone]  Dr. Virgilio Bella and Dr. Sidney Monzon (Pediatric Surgeon) # [phone]  Dr. Una Bermudez (Plastic and Reconstructive Surgery) # [phone]  Dr. Yosi Diaz (Pediatric Otolaryngology) # [phone]  Dr. Thuy Gross (Pediatric Interventional Radiology) # [phone]  Dr. Carol Perkins and Megan Mtz N.P. (Pediatric Hematologist-Oncology) # [phone]  Dr. Shen Smith (Pediatric Ophthalmology) # [phone]   Dr. Kassidy Chauhan (OBGYN) # [phone] or [phone] (urgent concerns)  Dr. Alexandre Bernstein (Pediatric Orthopaedic Surgeon) # [phone]  Dr. Fracisco Gracia (Genetics) & Krupa Schwab (Genetic Counselor) # [phone]- for appointments & # [phone]-for nurse questions        Clinic phone numbers have been provided should you need to call to set up any appointments with one of the specific providers in the future.     You may have additional co-pays for provider consults who will be directly involved in your care.     If additional imaging is recommended, please call [phone] or [phone] to schedule these appointments.     Thank you for your participation in the [facility]'s Vascular Lesions Clinic!       PLAN  Thank you for participating in the Vascular Lesions Clinic. The group has looked at the imaging and feels the lesions are consistent with Glomuvenous malformation (GVM). abnormal connections of veins. Every birthmark has a gene abnormality, yours coincides with Glomulen. This means you can making more spots over time. This involves just the skin and subcutaneous layer. This genetic abnormality is a 50% likelihood to pass along to  your children. Genetic testing is always an option in the future. This is not life threatening.     TREATMENT  ND Yag- usually covered by insurance. This would shrink the lesions down.  Surgical- trade for a scar

## 2022-10-05 NOTE — PROGRESS NOTES
[facility] Vascular Lesions Clinic  New Patient Consultation      CHIEF COMPLAINT:Blue patches on the skin    Dear Dr. Echevarria     We had the pleasure of seeing your patient, Misty Serrano in our multidisciplinary Vascular Lesions Clinic here at the [facility]. This specialized clinic offers interdisciplinary evaluation for patients with complex vascular anomalies. Multiple specialists were present in our evaluation today including Dr.'s Fernando Biswas, Emmanuelle Rivera and Clara Dale from Pediatric Dermatology, Dr. Thuy Gross from Pediatric Interventional Radiology, Dr. Hima Love, Pediatric Radiology, Dr. Carol Perkins and NP Megan Beatty, Pediatric Hematology/Oncology, Dr. Yosi Diaz, Pediatric ENT, Dr. Una Bermudez  Plastic Surgery, and Dr. Kassidy Chauhan, OB/Gyn.      HISTORY OF PRESENT ILLNESS:Suki was seen by our multisiciplinary clinic today for several blue spots on her skin. She reports that these spots, particularly one on the left inner upper arm have been present since birth. They seem to be growing with her, though she wonders if she's getting a few more spots over time. The lesions are asymptomatic unless they get hit or bumped, especially the one on her arm. She is establishing care today to find out what these are and if they are harmful to her. Suki denies any other family members having similar lesions.     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     REVIEW OF SYSTEMS: A 10-point review of systems was noncontributory.  Suki denies fevers, chills, weight loss, fatigue, chest  pain, shortness of breath, abdominal symptoms, nausea, vomiting, diarrhea, constipation, genitourinary, or musculoskeletal complaints.     MEDICATIONS:  Current Outpatient Medications   Medication     amLODIPine-valsartan (EXFORGE) 5-160 MG tablet     Etonogestrel (NEXPLANON SC)     hyoscyamine (LEVSIN) 0.125 MG tablet     diazepam (VALIUM) 5 MG tablet     No current facility-administered medications for this visit.         ALLERGIES:NKDA    PHYSICAL EXAMINATION:  VITALS: Wt 130.9 kg (288 lb 9.3 oz)   BMI 42.62 kg/m        GENERAL:Well-appearing, well-nourished in no acute distress.  HEAD: Normocephalic, non-dysmorphic.   EYES: Clear. Conjunctiva normal.  NECK: Supple.  RESPIRATORY: Patient is breathing comfortably in room air.   CARDIOVASCULAR: Well perfused in all extremities. No peripheral edema.   ABDOMEN: Nondistended.   EXTREMITIES: No clubbing or cyanosis. Nails normal.  SKIN: Full-body skin exam including inspection and palpation of the skin and subcutaneous tissues of the scalp, face, neck, chest, abdomen, back, bilateral upper extremities, bilateral lower extremities, buttocks and genitalia was completed today. Exam notable for:  A well appearing 27 year old woman, type I skin. On the left inner arm there is a 1.5 cm firm, rubbery blue hued nodule                    Imaging was discussed and interpreted at length with our group today at the 8:00AM meeting    MR left arm  without contrast 9/16/2022 6:52 PM     Techniques: Multiplanar multisequence imaging of the left arm was  obtained without and with administration of intravenous contrast using  routing protocol. Contrast: 13.0mL Gadavist     History: Area of interest - upper extremity, upper arm/lower arm;  Vascular malformation     Additional History from EMR: she has noticed a bump on the outer part  left arm by elbow, she has always had it. ?It is bluish purple. ?She  says it size about a dime or smaller. ?Never had bleeding episodes.  ?She does have  some faded birthmarks on her neck, mostly faded, a few  visible spots. ?No family hx of vascular malformations. ?She has not  had any imaging done of her arm, she is worried that she will bleed  out if it gets cut.     Comparison: None     Findings: Images degraded by motion.     Marker over the anteromedial left upper extremity at the level of the  antecubital fossa.     In proximity to the marker, there is a T2 hyperintense, T1 isointense  to skeletal muscle, lobular enhancing focus measuring 1.4 x 1.5 x 1.5  cm. Question small adjacent linear vascular structure on series 11  images 10-11.     Osseous structures  Osseous structures: No fracture, stress reaction, avascular necrosis,  or focal osseous lesion is seen.     Joint and periarticular soft tissue     Internal derangement of joints are not well assessed owing to chosen  field of view.       Muscles and tendons  Muscles: Visualized muscles are unremarkable without evidence of  muscle strain, atrophy or mass.      Tendons: The visualized tendons are intact.     Nerves:  Normal visualized course of the ulnar nerve.     Other Findings:  None.                                                                      Impression:     Corresponding to patient marker, there is a lobular T2 hyperintense,  enhancing focus within the subcutaneous fat extending to the skin  surface measuring up to 1.5 cm. Question small adjacent vessel on  axial image 11. MR appearance compatible with clinical history  vascular malformation.     CEDRIC ROBBINS MD (Joe)          IMPRESSION AND PLAN:    Suki has multifocal, superficial blue papules and nodules on several areas of the body, the largest is on her left upper inner arm. 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 GVMs are comprised of glomus cells and have a typical appearance both clinically and histopathologically. They tend to be multifocal and are a  result of mutations in the glomulin gene. This is an autosomal dominant condition and genetic testing is available to confirm this should the family desire. We discussed this with Suki and she deferred referral to genetics at this time, we encouraged her to follow up with genetics in the future, ines  We discussed the natural history and expected clinical course for glomuvenous malformations, including their tendency to persist lifelong and that the patient may acquire more over time. However they are completely benign and not related to any increased risk for malignancy. Treatment depends on symptoms versus cosmetic concern and could include laser therapy (Nd:YAG) surgical excision or sclerotherapy depending on the situation. These possible options were discussed briefly with Suki and could be considered in the future at any time.     Follow up in 2-3 years or sooner prn.    Thank you for involving us in the care of your patient.    Sincerely,     Fernando Biswas MD  , Dermatology & Pediatrics  , Pediatric Dermatology  Director, Vascular Anomalies Center, [facility]  Faculty Advisor    [facility]  Explorer Clinic, 12th Floor  [address] [place] [address] [phone] (clinic phone)  [phone] (fax)

## 2022-11-08 ENCOUNTER — VIRTUAL VISIT (OUTPATIENT)
Dept: SLEEP MEDICINE | Facility: CLINIC | Age: 27
End: 2022-11-08
Attending: INTERNAL MEDICINE
Payer: COMMERCIAL

## 2022-11-08 VITALS — BODY MASS INDEX: 42.21 KG/M2 | WEIGHT: 285 LBS | HEIGHT: 69 IN

## 2022-11-08 DIAGNOSIS — R29.818 SUSPECTED SLEEP APNEA: Primary | ICD-10-CM

## 2022-11-08 DIAGNOSIS — I10 PRIMARY HYPERTENSION: ICD-10-CM

## 2022-11-08 DIAGNOSIS — R06.83 SNORING: ICD-10-CM

## 2022-11-08 DIAGNOSIS — R53.82 CHRONIC FATIGUE: ICD-10-CM

## 2022-11-08 PROCEDURE — 99204 OFFICE O/P NEW MOD 45 MIN: CPT | Mod: 95 | Performed by: INTERNAL MEDICINE

## 2022-11-08 ASSESSMENT — SLEEP AND FATIGUE QUESTIONNAIRES
HOW LIKELY ARE YOU TO NOD OFF OR FALL ASLEEP WHILE SITTING AND TALKING TO SOMEONE: WOULD NEVER DOZE
HOW LIKELY ARE YOU TO NOD OFF OR FALL ASLEEP WHEN YOU ARE A PASSENGER IN A CAR FOR AN HOUR WITHOUT A BREAK: WOULD NEVER DOZE
HOW LIKELY ARE YOU TO NOD OFF OR FALL ASLEEP WHILE SITTING QUIETLY AFTER LUNCH WITHOUT ALCOHOL: WOULD NEVER DOZE
HOW LIKELY ARE YOU TO NOD OFF OR FALL ASLEEP WHILE WATCHING TV: WOULD NEVER DOZE
HOW LIKELY ARE YOU TO NOD OFF OR FALL ASLEEP WHILE LYING DOWN TO REST IN THE AFTERNOON WHEN CIRCUMSTANCES PERMIT: MODERATE CHANCE OF DOZING
HOW LIKELY ARE YOU TO NOD OFF OR FALL ASLEEP IN A CAR, WHILE STOPPED FOR A FEW MINUTES IN TRAFFIC: WOULD NEVER DOZE
HOW LIKELY ARE YOU TO NOD OFF OR FALL ASLEEP WHILE SITTING INACTIVE IN A PUBLIC PLACE: WOULD NEVER DOZE
HOW LIKELY ARE YOU TO NOD OFF OR FALL ASLEEP WHILE SITTING AND READING: SLIGHT CHANCE OF DOZING

## 2022-11-08 ASSESSMENT — PAIN SCALES - GENERAL: PAINLEVEL: NO PAIN (0)

## 2022-11-08 NOTE — PROGRESS NOTES
Suki is a 27 year old who is being evaluated via a billable video visit.      How would you like to obtain your AVS? MyChart  If the video visit is dropped, the invitation should be resent by: Send to e-mail at: [email or web address]  Will anyone else be joining your video visit? No    Abdirizak Dykes      Video-Visit Details    Video Start Time: 4:11 PM    Type of service:  Video Visit    Video End Time:4:32 PM    Originating Location (pt. Location): Home        Distant Location (provider location):  On-site    Platform used for Video Visit: [employer]     Sleep Consultation:    Date on this visit: 11/8/2022    Misty Serrano  is referred by Melody Echevarria for a sleep consultation.     Primary Physician: Melody Echevarria     Misty Serrano reports nightly snoring and poor quality of sleep and excessive daytime fatigue for many years.     Her medical history is significant for hypertension & depression.     Patient gives a history of poor sleep throughout her life.  She is most concerned about early morning awakening that happens intermittently.  She is excessively tired throughout the day.    Misty does snore loudly every night. Patient's  sleep separately because of her snoring.. She does not have witnessed apneas.  Patient sleeps on her side and stomach. She has occasional morning headaches, denies no morning dry mouth.     Misty denies any sleep walking, dream enactment and cataplexy.  She is experienced a rare episode of sleep paralysis; she remembers 2 episodes throughout her life.  She also has occasional episodes where she she has had a hypnagogic hallucinations.    She denies any restless leg symptoms.     Misty goes to sleep at 9:00 PM during the week. She falls asleep in 30 minutes.  Misty denies difficulty falling asleep.  She wakes up at 6:45 AM.  Some nights, she wakes up by around 5 to 5:30 AM and is unable to return to sleep.  She wakes up 1-2 times a night for 10 minutes before  "falling back to sleep.  Misty wakes up to go to the bathroom.  On weekends, Misty goes to sleep at 10:00 PM-  12:00 AM.  She wakes up at 7:00 - 9:00 AM.     Patient's [place] Sleepiness score 3/24 consistent with no daytime sleepiness.      Misty naps 0 times per week. She takes some inadvertant naps.  She denies closing eyes, dozing and falling asleep while driving. Patient was counseled on the importance of driving while alert, to pull over if drowsy, or nap before getting into the vehicle if sleepy.      She uses 1 1/2 cups/day of coffee. Last caffeine intake is usually before noon.      Problem List:  Patient Active Problem List    Diagnosis Date Noted     History of diverticulitis 07/11/2022     Priority: Medium     recurrent       Persistent depressive disorder      Priority: Medium     FRANCES (generalized anxiety disorder)      Priority: Medium     Morbid obesity (H) 01/17/2020     Priority: Medium        Past Medical/Surgical History:  Past Medical History:   Diagnosis Date     FRANCES (generalized anxiety disorder)      History of diverticulitis     recurrent     Morbid obesity (H)      Persistent depressive disorder      Social History     Tobacco Use     Smoking status: Never     Smokeless tobacco: Never   Vaping Use     Vaping Use: Never used   Substance Use Topics     Alcohol use: Not Currently     Drug use: Never       Family History:    Father has sleep apnea.     Physical Examination:  Vitals: Ht 1.753 m (5' 9\")   Wt 129.3 kg (285 lb)   BMI 42.09 kg/m    BMI= Body mass index is 42.09 kg/m .  GENERAL APPEARANCE: healthy, alert and no distress  NEURO: mentation intact and speech normal  PSYCH: mentation appears normal and affect normal/bright    Impression/Plan:    1.  Possible obstructive sleep apnea  2.  Hypertension    Patient is a 27 years old female with a BMI of 42, who presents with a history of loud snoring, nonrestorative sleep and excessive daytime fatigue.  There is an intermediate risk for " obstructive sleep apnea and an overnight sleep study is recommended for further evaluation.    Plan:     1. Home sleep apnea testing       She will follow up with me in approximately two weeks after her sleep study has been competed to review the results and discuss plan of care.       Polysomnography & HST reviewed.  Limitations of HST reviewed.   Obstructive sleep apnea reviewed.  Complications of untreated sleep apnea were reviewed.    I spent a total of 45 minutes for this appointment on this date of service which include time spent before, during and after the visit for chart review, patient care, counseling and coordination of care.    Dr. Dao Crews       CC: Melody Echevarria

## 2022-11-10 ENCOUNTER — TELEPHONE (OUTPATIENT)
Dept: SLEEP MEDICINE | Facility: CLINIC | Age: 27
End: 2022-11-10

## 2022-12-29 ASSESSMENT — SLEEP AND FATIGUE QUESTIONNAIRES
HOW LIKELY ARE YOU TO NOD OFF OR FALL ASLEEP IN A CAR, WHILE STOPPED FOR A FEW MINUTES IN TRAFFIC: WOULD NEVER DOZE
HOW LIKELY ARE YOU TO NOD OFF OR FALL ASLEEP WHILE SITTING AND READING: WOULD NEVER DOZE
HOW LIKELY ARE YOU TO NOD OFF OR FALL ASLEEP WHILE SITTING AND TALKING TO SOMEONE: WOULD NEVER DOZE
HOW LIKELY ARE YOU TO NOD OFF OR FALL ASLEEP WHILE SITTING INACTIVE IN A PUBLIC PLACE: WOULD NEVER DOZE
HOW LIKELY ARE YOU TO NOD OFF OR FALL ASLEEP WHILE LYING DOWN TO REST IN THE AFTERNOON WHEN CIRCUMSTANCES PERMIT: MODERATE CHANCE OF DOZING
HOW LIKELY ARE YOU TO NOD OFF OR FALL ASLEEP WHEN YOU ARE A PASSENGER IN A CAR FOR AN HOUR WITHOUT A BREAK: SLIGHT CHANCE OF DOZING
HOW LIKELY ARE YOU TO NOD OFF OR FALL ASLEEP WHILE WATCHING TV: WOULD NEVER DOZE
HOW LIKELY ARE YOU TO NOD OFF OR FALL ASLEEP WHILE SITTING QUIETLY AFTER LUNCH WITHOUT ALCOHOL: WOULD NEVER DOZE

## 2022-12-30 ENCOUNTER — MYC MEDICAL ADVICE (OUTPATIENT)
Dept: INTERNAL MEDICINE | Facility: CLINIC | Age: 27
End: 2022-12-30

## 2022-12-30 DIAGNOSIS — I10 UNCONTROLLED HYPERTENSION: ICD-10-CM

## 2023-01-02 RX ORDER — AMLODIPINE AND VALSARTAN 5; 160 MG/1; MG/1
1 TABLET ORAL DAILY
Qty: 90 TABLET | Refills: 1 | Status: SHIPPED | OUTPATIENT
Start: 2023-01-02 | End: 2023-06-08

## 2023-01-04 ENCOUNTER — OFFICE VISIT (OUTPATIENT)
Dept: SLEEP MEDICINE | Facility: CLINIC | Age: 28
End: 2023-01-04
Payer: COMMERCIAL

## 2023-01-04 DIAGNOSIS — R06.83 SNORING: ICD-10-CM

## 2023-01-04 DIAGNOSIS — R53.82 CHRONIC FATIGUE: ICD-10-CM

## 2023-01-04 DIAGNOSIS — I10 PRIMARY HYPERTENSION: ICD-10-CM

## 2023-01-04 DIAGNOSIS — R29.818 SUSPECTED SLEEP APNEA: ICD-10-CM

## 2023-01-04 PROCEDURE — G0399 HOME SLEEP TEST/TYPE 3 PORTA: HCPCS | Mod: 52 | Performed by: PSYCHIATRY & NEUROLOGY

## 2023-01-05 ENCOUNTER — DOCUMENTATION ONLY (OUTPATIENT)
Dept: SLEEP MEDICINE | Facility: CLINIC | Age: 28
End: 2023-01-05
Payer: COMMERCIAL

## 2023-01-05 NOTE — PROGRESS NOTES
Pt is completing a home sleep test. Pt was instructed on how to put on the Noxturnal T3 device and associated equipment before going to bed and given the opportunity to practice putting it on before leaving the sleep center. Pt was reminded to bring the home sleep test kit back to the center tomorrow, at agreed upon time for download and reporting.     Lisa Reid CMA, HST Specialist  [place] [facility]

## 2023-01-05 NOTE — PROGRESS NOTES
HST POST-STUDY QUESTIONNAIRE    1. What time did you go to bed?  22;20  2. How long do you think it took to fall asleep?  1 hour 10 min  3. What time did you wake up to start the day?  05:35  4. Did you get up during the night at all?  no  5. If you woke up, do you remember approximately what time(s)? 02:14  6. Did you have any difficulty with the equipment?  Yes one of the face stickers came off in the midde of the night and the nose thing popped out  7. Did you us any type of treatment with this study?  None  8. Was the head of the bed elevated? No  9. Did you sleep in a recliner?  No  10. Did you stop using CPAP at least 3 days before this test?  NA  11. Any other information you'd like us to know? Difficult to sleep through the night- might need to schedule in person

## 2023-01-05 NOTE — PROGRESS NOTES
This HSAT was performed using a Noxturnal T3 device which recorded snore, sound, movement activity, body position, nasal pressure, oronasal thermal airflow, pulse, oximetry and both chest and abdominal respiratory effort. HSAT data was restricted to the time patient states they were in bed.     HSAT was scored using 1B 4% hypopnea rule.     HST AHI (Non-PAT): 4.3.  Snoring was reported as mild.  Time with SpO2 below 89% was 0.2 minutes.   Overall signal quality was good.     Pt will follow up with sleep provider to determine appropriate therapy.

## 2023-01-05 NOTE — PROGRESS NOTES
Pt returned HST device. It was downloaded and forwarded data to the clinical specialist for scoring.  Lisa Reid CMA, HST Specialist  [place] [facility]

## 2023-01-08 NOTE — PROCEDURES
"HOME SLEEP STUDY INTERPRETATION        Patient: Misty Serrano  MRN: [id number]  YOB: 1995  Study Date: 2023  PCP/Referring Provider: Melody Echevarria;   Ordering Provider: Dao Crews MD         Indications for Home Study: Misty Serrano is a 27 year old female with a history of symptoms suggestive of obstructive sleep apnea.    Estimated body mass index is 42.09 kg/m  as calculated from the following:    Height as of 22: 1.753 m (5' 9\").    Weight as of 22: 129.3 kg (285 lb).  Total score - [place]: 3 (2022  9:53 AM)  STOP-BAN/8        Data: A full night home sleep study was performed recording the standard physiologic parameters including body position, movement, sound, nasal pressure, thermal oral airflow, chest and abdominal movements with respiratory inductance plethysmography, and oxygen saturation by pulse oximetry. Pulse rate was estimated by oximetry recording. This study was considered adequate based on > 4 hours of quality oximetry and respiratory recording. As specified by the AASM Manual for the Scoring of Sleep and Associated events, version 2.3, Rule VIII.D 1B, 4% oxygen desaturation scoring for hypopneas is used as a standard of care on all home sleep apnea testing.        Analysis Time:  352 minutes        Respiration:   Sleep Associated Hypoxemia: sustained hypoxemia was not present. Baseline oxygen saturation was 92%.  Time with saturation less than or equal to 88% was < 1 minute. The lowest oxygen saturation was 87%.   Snoring: Snoring was present.  Respiratory events: The home study revealed a presence of 1 obstructive apneas and 0 mixed and central apneas. There were 15 hypopneas resulting in a combined apnea/hypopnea index [AHI] of 4.3 events per hour.  AHI was 4.4 per hour supine, 3.7 per hour prone, 5.0 per hour on left side, and 4.3 per hour on right side.   Pattern: Excluding events noted above, respiratory rate and pattern was " Normal.      Heart Rate: By pulse oximetry tachycardia was noted.       Assessment:     This study did not demonstrate clinically significant sleep disordered breathing.     Recommendations:    While the patient may be reassured that, per this study, they do not have significant sleep apnea if there is a lingering clinical concern for sleep disordered breathing consider an in-laboratory PSG.     Suggest optimizing sleep hygiene and avoiding sleep deprivation.    Weight management.        Diagnosis Code(s): Snoring R06.83    Aram Hopper MD, January 8, 2023   Diplomate, American Board of Psychiatry and Neurology, Sleep Medicine

## 2023-03-29 ASSESSMENT — SLEEP AND FATIGUE QUESTIONNAIRES
HOW LIKELY ARE YOU TO NOD OFF OR FALL ASLEEP WHILE SITTING INACTIVE IN A PUBLIC PLACE: WOULD NEVER DOZE
HOW LIKELY ARE YOU TO NOD OFF OR FALL ASLEEP WHEN YOU ARE A PASSENGER IN A CAR FOR AN HOUR WITHOUT A BREAK: SLIGHT CHANCE OF DOZING
HOW LIKELY ARE YOU TO NOD OFF OR FALL ASLEEP WHILE WATCHING TV: WOULD NEVER DOZE
HOW LIKELY ARE YOU TO NOD OFF OR FALL ASLEEP WHILE SITTING QUIETLY AFTER LUNCH WITHOUT ALCOHOL: WOULD NEVER DOZE
HOW LIKELY ARE YOU TO NOD OFF OR FALL ASLEEP WHILE SITTING AND TALKING TO SOMEONE: WOULD NEVER DOZE
HOW LIKELY ARE YOU TO NOD OFF OR FALL ASLEEP WHILE LYING DOWN TO REST IN THE AFTERNOON WHEN CIRCUMSTANCES PERMIT: MODERATE CHANCE OF DOZING
HOW LIKELY ARE YOU TO NOD OFF OR FALL ASLEEP IN A CAR, WHILE STOPPED FOR A FEW MINUTES IN TRAFFIC: WOULD NEVER DOZE
HOW LIKELY ARE YOU TO NOD OFF OR FALL ASLEEP WHILE SITTING AND READING: WOULD NEVER DOZE

## 2023-04-04 ENCOUNTER — MYC MEDICAL ADVICE (OUTPATIENT)
Dept: SLEEP MEDICINE | Facility: CLINIC | Age: 28
End: 2023-04-04
Payer: COMMERCIAL

## 2023-04-04 DIAGNOSIS — R29.818 SUSPECTED SLEEP APNEA: Primary | ICD-10-CM

## 2023-04-04 DIAGNOSIS — R53.82 CHRONIC FATIGUE: ICD-10-CM

## 2023-04-04 DIAGNOSIS — I10 PRIMARY HYPERTENSION: ICD-10-CM

## 2023-04-04 DIAGNOSIS — R06.83 SNORING: ICD-10-CM

## 2023-04-04 NOTE — TELEPHONE ENCOUNTER
Patient is requesting an order for an in lab PSG per recommendations on her sleep study.  She wants to hopefully get it done before the follow up.  Scheduled for follow up 8/21/23.

## 2023-06-07 DIAGNOSIS — I10 UNCONTROLLED HYPERTENSION: ICD-10-CM

## 2023-06-08 RX ORDER — AMLODIPINE AND VALSARTAN 5; 160 MG/1; MG/1
1 TABLET ORAL DAILY
Qty: 90 TABLET | Refills: 0 | Status: SHIPPED | OUTPATIENT
Start: 2023-06-08 | End: 2023-07-14

## 2023-06-08 NOTE — TELEPHONE ENCOUNTER
Prescription approved per [facility] Refill Protocol.  Conchis Schumacher, RN  [facility] Triage Nurse

## 2023-07-13 PROBLEM — I10 BENIGN ESSENTIAL HYPERTENSION: Status: ACTIVE | Noted: 2023-07-13

## 2023-07-13 ASSESSMENT — ENCOUNTER SYMPTOMS
HEMATOCHEZIA: 0
JOINT SWELLING: 0
EYE PAIN: 0
SORE THROAT: 0
CHILLS: 0
CONSTIPATION: 0
SHORTNESS OF BREATH: 0
WEAKNESS: 0
HEADACHES: 0
PALPITATIONS: 0
COUGH: 0
DYSURIA: 0
NAUSEA: 0
ABDOMINAL PAIN: 0
FEVER: 0
DIZZINESS: 0
FREQUENCY: 0
BREAST MASS: 0
HEMATURIA: 0
PARESTHESIAS: 0
DIARRHEA: 0
NERVOUS/ANXIOUS: 0
ARTHRALGIAS: 0
MYALGIAS: 0
HEARTBURN: 0

## 2023-07-13 ASSESSMENT — PATIENT HEALTH QUESTIONNAIRE - PHQ9
SUM OF ALL RESPONSES TO PHQ QUESTIONS 1-9: 15
SUM OF ALL RESPONSES TO PHQ QUESTIONS 1-9: 15
10. IF YOU CHECKED OFF ANY PROBLEMS, HOW DIFFICULT HAVE THESE PROBLEMS MADE IT FOR YOU TO DO YOUR WORK, TAKE CARE OF THINGS AT HOME, OR GET ALONG WITH OTHER PEOPLE: VERY DIFFICULT

## 2023-07-14 ENCOUNTER — OFFICE VISIT (OUTPATIENT)
Dept: INTERNAL MEDICINE | Facility: CLINIC | Age: 28
End: 2023-07-14
Payer: COMMERCIAL

## 2023-07-14 VITALS
HEIGHT: 69 IN | TEMPERATURE: 97.6 F | DIASTOLIC BLOOD PRESSURE: 82 MMHG | SYSTOLIC BLOOD PRESSURE: 124 MMHG | HEART RATE: 91 BPM | WEIGHT: 278 LBS | RESPIRATION RATE: 18 BRPM | OXYGEN SATURATION: 100 % | BODY MASS INDEX: 41.18 KG/M2

## 2023-07-14 DIAGNOSIS — Z13.1 SCREENING FOR DIABETES MELLITUS: ICD-10-CM

## 2023-07-14 DIAGNOSIS — Z13.220 SCREENING FOR CHOLESTEROL LEVEL: ICD-10-CM

## 2023-07-14 DIAGNOSIS — F34.1 PERSISTENT DEPRESSIVE DISORDER: ICD-10-CM

## 2023-07-14 DIAGNOSIS — R73.01 IMPAIRED FASTING GLUCOSE: ICD-10-CM

## 2023-07-14 DIAGNOSIS — R10.9 ABDOMINAL CRAMPING: ICD-10-CM

## 2023-07-14 DIAGNOSIS — E66.01 MORBID OBESITY (H): ICD-10-CM

## 2023-07-14 DIAGNOSIS — I10 BENIGN ESSENTIAL HYPERTENSION: ICD-10-CM

## 2023-07-14 DIAGNOSIS — F41.1 GAD (GENERALIZED ANXIETY DISORDER): ICD-10-CM

## 2023-07-14 DIAGNOSIS — Z00.00 ROUTINE HISTORY AND PHYSICAL EXAMINATION OF ADULT: Primary | ICD-10-CM

## 2023-07-14 LAB
ANION GAP SERPL CALCULATED.3IONS-SCNC: 12 MMOL/L (ref 7–15)
BUN SERPL-MCNC: 11.4 MG/DL (ref 6–20)
CALCIUM SERPL-MCNC: 9.4 MG/DL (ref 8.6–10)
CHLORIDE SERPL-SCNC: 104 MMOL/L (ref 98–107)
CHOLEST SERPL-MCNC: 132 MG/DL
CREAT SERPL-MCNC: 0.61 MG/DL (ref 0.51–0.95)
DEPRECATED HCO3 PLAS-SCNC: 22 MMOL/L (ref 22–29)
GFR SERPL CREATININE-BSD FRML MDRD: >90 ML/MIN/1.73M2
GLUCOSE SERPL-MCNC: 93 MG/DL (ref 70–99)
HBA1C MFR BLD: 6 % (ref 0–5.6)
HDLC SERPL-MCNC: 35 MG/DL
LDLC SERPL CALC-MCNC: 80 MG/DL
NONHDLC SERPL-MCNC: 97 MG/DL
POTASSIUM SERPL-SCNC: 3.8 MMOL/L (ref 3.4–5.3)
SODIUM SERPL-SCNC: 138 MMOL/L (ref 136–145)
TRIGL SERPL-MCNC: 86 MG/DL

## 2023-07-14 PROCEDURE — 36415 COLL VENOUS BLD VENIPUNCTURE: CPT | Performed by: INTERNAL MEDICINE

## 2023-07-14 PROCEDURE — 80048 BASIC METABOLIC PNL TOTAL CA: CPT | Performed by: INTERNAL MEDICINE

## 2023-07-14 PROCEDURE — 80061 LIPID PANEL: CPT | Performed by: INTERNAL MEDICINE

## 2023-07-14 PROCEDURE — 83036 HEMOGLOBIN GLYCOSYLATED A1C: CPT | Performed by: INTERNAL MEDICINE

## 2023-07-14 PROCEDURE — 99395 PREV VISIT EST AGE 18-39: CPT | Performed by: INTERNAL MEDICINE

## 2023-07-14 RX ORDER — AMLODIPINE AND VALSARTAN 5; 160 MG/1; MG/1
1 TABLET ORAL DAILY
Qty: 90 TABLET | Refills: 3 | Status: SHIPPED | OUTPATIENT
Start: 2023-07-14 | End: 2023-10-19

## 2023-07-14 RX ORDER — HYOSCYAMINE SULFATE 0.125 MG
0.12 TABLET ORAL EVERY 6 HOURS PRN
Qty: 30 TABLET | Refills: 1 | Status: SHIPPED | OUTPATIENT
Start: 2023-07-14 | End: 2023-08-01

## 2023-07-14 NOTE — PROGRESS NOTES
ASSESSMENT/PLAN                                                       (Z00.00) Routine history and physical examination of adult  (primary encounter diagnosis)  Comment: PMH, PSH, FH, SH, medications, allergies, immunizations, and preventative health measures reviewed and updated as appropriate.  Plan: see below for plans.      (I10) Benign essential hypertension  Comment: well-controlled on current regimen.    Plan: continue present management; refills provided.     (R10.9) Abdominal cramping  Comment: well-controlled on current regimen.    Plan: continue present management; refills provided.     (Z13.220) Screening for cholesterol level  (Z13.1) Screening for diabetes mellitus  (R73.01) Impaired fasting glucose  Plan: non-fasting labs today.     (F34.1) Persistent depressive disorder  (F41.1) FRANCES (generalized anxiety disorder)  Comment: stable with regular counseling; declines medication at this time.    (E66.01) Morbid obesity (H)  Comment: known issue that I take into account for their medical decisions, no current exacerbations or new concerns.    Melody Echevarria MD   [address] [address]  T: [phone], F: [phone]    SUBJECTIVE                                                      Misty Serrano is a very pleasant 28 year old female who presents for a physical.    ROS:  Constitutional: no unintentional weight loss or gain reported; no fevers, chills, or sweats reported  Cardiovascular: no chest pain, palpitations, or edema reported  Respiratory: no cough, wheezing, shortness of breath, or dyspnea on exertion reported  Gastrointestinal: no nausea, vomiting, constipation, diarrhea, or abdominal pain reported  Genitourinary: no urinary frequency, urgency, dysuria, or hematuria reported  Integumentary: no rash or pruritus reported  Musculoskeletal: no back pain, muscle pain, joint pain, or joint swelling reported  Neurologic: no focal weakness, numbness, or  tingling reported  Hematologic: no easy bruising or bleeding reported  Endocrine: no heat or cold intolerance reported; no polyuria or polydipsia reported  Psychiatric: see PMH below    Past Medical History:   Diagnosis Date     Benign essential hypertension      FRANCES (generalized anxiety disorder)      History of diverticulitis     recurrent     Morbid obesity (H)      Persistent depressive disorder      Past Surgical History:   Procedure Laterality Date     COLONOSCOPY N/A 8/13/2021    Procedure: Colonoscopy, With Polypectomy And Biopsy;  Surgeon: Tejal Jarrett DO;  Location: [facility] OR     COLONOSCOPY WITH CO2 INSUFFLATION N/A 8/13/2021    Procedure: COLONOSCOPY, WITH CO2 INSUFFLATION;  Surgeon: Tejal Jarrett DO;  Location: [facility] OR     NO HISTORY OF SURGERY       Family History   Problem Relation Age of Onset     Hypertension Mother      Rheumatoid Arthritis Mother      Hypertension Father      Diabetes Type 2  Maternal Grandfather      Coronary Artery Disease Maternal Grandfather         s/p CABG later in life     Prostate Cancer Maternal Grandfather      Skin Cancer Maternal Grandfather      Diabetes Type 2  Paternal Grandfather      Coronary Artery Disease Paternal Grandfather         s/p CABG later in life     Myocardial Infarction No family hx of      Cerebrovascular Disease No family hx of      Coronary Artery Disease Early Onset No family hx of      Breast Cancer No family hx of      Colon Cancer No family hx of      Ovarian Cancer No family hx of      Social History     Occupational History     Occupation: [employer] -    Tobacco Use     Smoking status: Never     Smokeless tobacco: Never   Vaping Use     Vaping Use: Never used   Substance and Sexual Activity     Alcohol use: Not Currently     Drug use: Never     Sexual activity: Yes     Birth control/protection: Implant     Comment: Nexplanon - placed 2020   Social History Narrative    .    No kids.    No formal exercise.  "     No Known Allergies     Current Outpatient Medications   Medication Sig     amLODIPine-valsartan (EXFORGE) 5-160 MG tablet Take 1 tablet by mouth daily     Etonogestrel (NEXPLANON SC)      hyoscyamine (LEVSIN) 0.125 MG tablet Take 1 tablet (125 mcg) by mouth every 6 hours as needed for cramping     Immunization History   Administered Date(s) Administered     COVID-19 MONOVALENT 12+ (Pfizer) 04/19/2021, 05/10/2021, 01/14/2022     DTaP, Unspecified 05/08/2006     HPV9 07/22/2020     HepB, Unspecified 1994     Hib, Unspecified 1995, 1995, 1994     Historical DTP/aP 1995, 1994, 1994, 09/14/1995, 03/08/1999     MMR 06/02/1995, 03/08/1999     Polio, Unspecified  1994, 1994, 09/14/1995, 03/08/1999     TDAP (Adacel,Boostrix) 05/08/2006     Td (Adult), Adsorbed 07/22/2020     Td, Absorbed, Pf, Adult, Lf Unspecified 07/22/2020     Varicella 06/02/1995     PREVENTATIVE HEALTH                                                      BMI: morbidly obese  Blood pressure: well-controlled on current regimen   Breast CA screening: not medically indicated at this time   Cervical CA screening: up to date   Colon CA screening: not medically indicated at this time   Lung CA screening: n/a   Dexa: not medically indicated at this time   Screening cholesterol: DUE  Screening diabetes: DUE  STD testing: no risk factors present  Alcohol misuse screening: alcohol use reviewed - no intervention indicated at this time  Immunizations: reviewed; up to date     OBJECTIVE                                                      /82   Pulse 91   Temp 97.6  F (36.4  C) (Temporal)   Resp 18   Ht 1.753 m (5' 9\")   Wt 126.1 kg (278 lb)   SpO2 100%   BMI 41.05 kg/m    Constitutional: well-appearing  Head, Ears, and Eyes: normocephalic; normal external auditory canal and pinna; tympanic membranes visualized and normal; normal lids and conjunctivae  Neck: supple, symmetric, no thyromegaly or " lymphadenopathy  Respiratory: normal respiratory effort; clear to auscultation bilaterally  Cardiovascular: regular rate and rhythm; no edema  Gastrointestinal: soft, non-tender, and non-distended; no organomegaly or masses  Musculoskeletal: normal gait and station  Psych: normal judgment and insight; normal mood and affect; recent and remote memory intact    ---  (Note was completed, in part, with [employer] voice-recognition software. Documentation was reviewed, but some grammatical, spelling, and word errors may remain.)

## 2023-08-01 ENCOUNTER — MYC MEDICAL ADVICE (OUTPATIENT)
Dept: INTERNAL MEDICINE | Facility: CLINIC | Age: 28
End: 2023-08-01

## 2023-08-01 DIAGNOSIS — R10.9 ABDOMINAL CRAMPING: ICD-10-CM

## 2023-08-01 RX ORDER — HYOSCYAMINE SULFATE 0.125 MG
TABLET ORAL
Qty: 30 TABLET | Refills: 1 | Status: SHIPPED | OUTPATIENT
Start: 2023-08-01 | End: 2023-12-21

## 2023-08-08 DIAGNOSIS — R10.9 ABDOMINAL CRAMPING: ICD-10-CM

## 2023-08-08 RX ORDER — HYOSCYAMINE SULFATE 0.125 MG
TABLET ORAL
Qty: 30 TABLET | Refills: 1 | OUTPATIENT
Start: 2023-08-08

## 2023-08-08 ASSESSMENT — SLEEP AND FATIGUE QUESTIONNAIRES
HOW LIKELY ARE YOU TO NOD OFF OR FALL ASLEEP WHILE SITTING AND READING: WOULD NEVER DOZE
HOW LIKELY ARE YOU TO NOD OFF OR FALL ASLEEP IN A CAR, WHILE STOPPED FOR A FEW MINUTES IN TRAFFIC: WOULD NEVER DOZE
HOW LIKELY ARE YOU TO NOD OFF OR FALL ASLEEP WHILE WATCHING TV: WOULD NEVER DOZE
HOW LIKELY ARE YOU TO NOD OFF OR FALL ASLEEP WHILE LYING DOWN TO REST IN THE AFTERNOON WHEN CIRCUMSTANCES PERMIT: MODERATE CHANCE OF DOZING
HOW LIKELY ARE YOU TO NOD OFF OR FALL ASLEEP WHEN YOU ARE A PASSENGER IN A CAR FOR AN HOUR WITHOUT A BREAK: SLIGHT CHANCE OF DOZING
HOW LIKELY ARE YOU TO NOD OFF OR FALL ASLEEP WHILE SITTING AND TALKING TO SOMEONE: WOULD NEVER DOZE
HOW LIKELY ARE YOU TO NOD OFF OR FALL ASLEEP WHILE SITTING INACTIVE IN A PUBLIC PLACE: WOULD NEVER DOZE
HOW LIKELY ARE YOU TO NOD OFF OR FALL ASLEEP WHILE SITTING QUIETLY AFTER LUNCH WITHOUT ALCOHOL: WOULD NEVER DOZE

## 2023-08-08 NOTE — TELEPHONE ENCOUNTER
Script sent to the pharmacy on 8/1/23 for 30 tablets with one additional refill on file.     Patient should have refills on file with the pharmacy.     Conchis Schumacher RN

## 2023-08-14 ENCOUNTER — THERAPY VISIT (OUTPATIENT)
Dept: SLEEP MEDICINE | Facility: CLINIC | Age: 28
End: 2023-08-14
Payer: COMMERCIAL

## 2023-08-14 DIAGNOSIS — R06.83 SNORING: ICD-10-CM

## 2023-08-14 DIAGNOSIS — R29.818 SUSPECTED SLEEP APNEA: ICD-10-CM

## 2023-08-14 DIAGNOSIS — R53.82 CHRONIC FATIGUE: ICD-10-CM

## 2023-08-14 DIAGNOSIS — G47.33 OSA (OBSTRUCTIVE SLEEP APNEA): Primary | ICD-10-CM

## 2023-08-14 DIAGNOSIS — I10 PRIMARY HYPERTENSION: ICD-10-CM

## 2023-08-14 PROCEDURE — 95810 POLYSOM 6/> YRS 4/> PARAM: CPT | Performed by: INTERNAL MEDICINE

## 2023-08-15 PROBLEM — G47.33 OSA (OBSTRUCTIVE SLEEP APNEA): Status: ACTIVE | Noted: 2023-08-15

## 2023-08-15 LAB — SLPCOMP: NORMAL

## 2023-08-15 NOTE — PROCEDURES
"   SLEEP STUDY INTERPRETATION  DIAGNOSTIC POLYSOMNOGRAPHY REPORT      Patient: BECK VACA  YOB: 1995  Study Date: 8/14/2023  MRN: [id number]  Referring Provider: -  Ordering Provider: Dao Crews MD    Indications for Polysomnography: The patient is a 28 year old Female who is 5' 9\" and weighs 278.0 lbs. Her BMI is 41.2, [place] sleepiness scale 3 and neck circumference is 46 cm. A diagnostic polysomnogram was performed to evaluate for sleep apnea.    Polysomnogram Data: A full night polysomnogram recorded the standard physiologic parameters including EEG, EOG, EMG, ECG, nasal and oral airflow. Respiratory parameters of chest and abdominal movements were recorded with respiratory inductance plethysmography. Oxygen saturation was recorded by pulse oximetry. Hypopnea scoring rule used: 1B 4%.    Sleep Architecture: Severely fragmented sleep with reduced sleep efficiency. Percentage of REM was low.   The total recording time of the polysomnogram was 491.6 minutes. The total sleep time was 225.0 minutes. Sleep latency was increased at 55.7 minutes without the use of a sleep aid. REM latency was 315.0 minutes. Arousal index was increased at 111.7 arousals per hour. Sleep efficiency was decreased at 45.8%. Wake after sleep onset was 210.5 minutes. The patient spent 13.3% of total sleep time in Stage N1, 45.1% in Stage N2, 36.7% in Stage N3, and 4.9% in REM. Time in REM supine was - minutes.    Respiration: Severe obstructive sleep apnea with associated oxygen desaturations.   Events ? The polysomnogram revealed a presence of 15 obstructive, - central, and - mixed apneas resulting in an apnea index of 4.0 events per hour. There were 171 obstructive hypopneas and - central hypopneas resulting in an obstructive hypopnea index of 45.6 and central hypopnea index of - events per hour. The combined apnea/hypopnea index was 49.6 events per hour (central apnea/hypopnea index was - events per hour). The REM " AHI was 43.6 events per hour. The supine AHI was 65.5 events per hour. The RERA index was 40.8 events per hour.  The RDI was 90.4 events per hour.  Snoring - was reported as moderate.  Respiratory rate and pattern - was notable for normal respiratory rate and pattern.  Sustained Sleep Associated Hypoventilation - Transcutaneous carbon dioxide monitoring was not used; however significant hypoventilation was not suggested by oximetry.  Sleep Associated Hypoxemia - (Greater than 5 minutes O2 sat at or below 88%) was present. Baseline oxygen saturation was 93.8%. Lowest oxygen saturation was 81.0%. Time spent less than or equal to 88% was 20.2 minutes. Time spent less than or equal to 89% was 32.0 minutes.    Movement Activity: Negative for significant movement abnormalities.   Periodic Limb Activity - There were 43 PLMs during the entire study. The PLM index was 11.5 movements per hour. The PLM Arousal Index was 8.5 per hour.  REM EMG Activity - Excessive transient/sustained muscle activity was not present.  Nocturnal Behavior - Abnormal sleep related behaviors were not noted during/arising out of NREM / REM sleep.   Bruxism - None apparent.    Cardiac Summary: Sinus rhythm.   The average pulse rate was 79.1 bpm. The minimum pulse rate was 54.0 bpm while the maximum pulse rate was 113.0 bpm.  Arrhythmias were not noted.    Assessment:   Severe obstructive sleep apnea with associated oxygen desaturations and sleep fragmentation. Of note, there was minimal supine sleep during this test.   Sleep latency was prolonged, sleep efficiency was reduced  and percentage of REM was low. Sleep was highly fragmented.     Recommendations:  CPAP therapy is recommended for treatment of severe obstructive sleep apnea. Treatment could be empirically initiated with Auto?titrating PAP therapy with a range of 5 to 15 cmH2O. Recommend clinical follow up with sleep management team.  If CPAP is not tolerated or accepted, alternative therapy  considerations include dental appliance through referral to Sleep or evaluation of possible surgical options through referral to specialized ENT-Sleep provider.  Suggest optimizing sleep schedule and avoiding sleep deprivation.  Weight management (if BMI > 30).    Diagnostic Codes:   Obstructive Sleep Apnea G47.33  Sleep Hypoxemia G47.36   Repetitive Intrusions Into Sleep F51.8    8/14/2023 [facility] Study (278.0 lbs) - AHI 49.6, RDI 90.4, Supine AHI 65.5, REM AHI 43.6, Low O2 81.0%, Time Spent ?88% 20.2 minutes / Time Spent ?89% 32.0 minutes.    _____________________________________   Electronically Signed By: Dao Crews MD 08/15/2023

## 2023-08-31 ASSESSMENT — SLEEP AND FATIGUE QUESTIONNAIRES
HOW LIKELY ARE YOU TO NOD OFF OR FALL ASLEEP WHILE WATCHING TV: WOULD NEVER DOZE
HOW LIKELY ARE YOU TO NOD OFF OR FALL ASLEEP WHILE LYING DOWN TO REST IN THE AFTERNOON WHEN CIRCUMSTANCES PERMIT: MODERATE CHANCE OF DOZING
HOW LIKELY ARE YOU TO NOD OFF OR FALL ASLEEP WHILE SITTING INACTIVE IN A PUBLIC PLACE: WOULD NEVER DOZE
HOW LIKELY ARE YOU TO NOD OFF OR FALL ASLEEP WHILE SITTING AND READING: WOULD NEVER DOZE
HOW LIKELY ARE YOU TO NOD OFF OR FALL ASLEEP WHEN YOU ARE A PASSENGER IN A CAR FOR AN HOUR WITHOUT A BREAK: SLIGHT CHANCE OF DOZING
HOW LIKELY ARE YOU TO NOD OFF OR FALL ASLEEP IN A CAR, WHILE STOPPED FOR A FEW MINUTES IN TRAFFIC: WOULD NEVER DOZE
HOW LIKELY ARE YOU TO NOD OFF OR FALL ASLEEP WHILE SITTING QUIETLY AFTER LUNCH WITHOUT ALCOHOL: WOULD NEVER DOZE
HOW LIKELY ARE YOU TO NOD OFF OR FALL ASLEEP WHILE SITTING AND TALKING TO SOMEONE: WOULD NEVER DOZE

## 2023-09-06 ENCOUNTER — VIRTUAL VISIT (OUTPATIENT)
Dept: SLEEP MEDICINE | Facility: CLINIC | Age: 28
End: 2023-09-06
Payer: COMMERCIAL

## 2023-09-06 DIAGNOSIS — G47.33 OSA (OBSTRUCTIVE SLEEP APNEA): Primary | ICD-10-CM

## 2023-09-06 PROCEDURE — 99213 OFFICE O/P EST LOW 20 MIN: CPT | Mod: VID | Performed by: INTERNAL MEDICINE

## 2023-09-06 NOTE — PROGRESS NOTES
Suki is a 28 year old who is being evaluated via a billable video visit.      Video-Visit Details    Type of service:  Video Visit     Originating Location (pt. Location): Home    Distant Location (provider location):  On-site  Platform used for Video Visit: [facility]    Sleep Study Follow-Up Visit:    Date on this visit: 9/6/2023    Misty Serrano comes in today for follow-up of her sleep study done on 8/14/23 at the [facility] for possible sleep apnea.    Sleep latency 55.7 minutes without Ambien.  REM achieved.   REM latency 315 minutes.  Sleep efficiency 45.8%. Total sleep time 225 minutes.    Sleep architecture:  Stage 1, 13.3% (5%), stage 2, 45% (45-55%), stage 3, 36.7% (15-20%), stage REM, 4.9% (20-25%).  AHI was 49.6, with desaturations. RDI 90.4.  REM AHI 43.6, consistent with severe REM BRENDEN.  Supine AHI 65.5, consistent with severe SUPINE BRENDEN.  Periodic Limb Movement Index 11.5/hour.       These findings were reviewed with patient.     Past medical/surgical history, family history, social history, medications and allergies were reviewed.      Impression/Plan:    Severe obstructive sleep apnea  Sleep associated hypoxemia     PSG was reviewed in detail. We discussed severe obstructive sleep apnea, consequences of untreated disease and management options. CPAP therapy is the treatment of choice for severe obstructive sleep apnea.  Patient has reservations about her ability to use CPAP and asked about alternate therapy options.  I reviewed alternate therapy options, mainly dental appliance and surgical options and efficacy was compared.  Patient also has concerns regarding cost of CPAP therapy.  After informed discussion, we decided on the following plan.  I will initiate a prescription for CPAP and she will consult DME regarding.  Initiation of CPAP.    Plan:     Start auto titrating CPAP therapy with a pressure range of 5 to 15 cm H2O  She was advised to follow-up in about 2 to 3 months after  starting treatment to monitor response     I spent a total of 20 minutes for this appointment on this date of service which include time spent before, during and after the visit for chart review, patient care, counseling and coordination of care.      Dr. Dao Crews     CC: Melody Echevarria

## 2023-10-15 ENCOUNTER — MYC MEDICAL ADVICE (OUTPATIENT)
Dept: INTERNAL MEDICINE | Facility: CLINIC | Age: 28
End: 2023-10-15
Payer: COMMERCIAL

## 2023-10-15 DIAGNOSIS — I10 BENIGN ESSENTIAL HYPERTENSION: ICD-10-CM

## 2023-10-19 RX ORDER — AMLODIPINE AND VALSARTAN 5; 160 MG/1; MG/1
1 TABLET ORAL DAILY
Qty: 90 TABLET | Refills: 3 | Status: SHIPPED | OUTPATIENT
Start: 2023-10-19

## 2023-10-23 ENCOUNTER — DOCUMENTATION ONLY (OUTPATIENT)
Dept: SLEEP MEDICINE | Facility: CLINIC | Age: 28
End: 2023-10-23
Payer: COMMERCIAL

## 2023-10-23 DIAGNOSIS — G47.33 OSA (OBSTRUCTIVE SLEEP APNEA): Primary | ICD-10-CM

## 2023-10-23 NOTE — PROGRESS NOTES
Patient was offered choice of vendor and chose [facility].  Patient Misty Serrano was set up at [facility] on October 23, 2023. Patient received a Resmed Airsense 11 Pressures were set at  5-15 cm H2O.   Patient s ramp is 5 cm H2O for Off and FLEX/EPR is 2.  Patient received a [employer] Mask name: NUANCE PRO  Pillow mask size Medium, heated tubing and heated humidifier.  Patient has the following compliance requirements: none.    Abimael Tomlin

## 2023-10-26 ENCOUNTER — DOCUMENTATION ONLY (OUTPATIENT)
Dept: SLEEP MEDICINE | Facility: CLINIC | Age: 28
End: 2023-10-26
Payer: COMMERCIAL

## 2023-10-26 DIAGNOSIS — G47.33 OSA (OBSTRUCTIVE SLEEP APNEA): Primary | ICD-10-CM

## 2023-10-26 NOTE — PROGRESS NOTES
3 day Sleep therapy management telephone visit    Diagnostic AHI: 49.6 PSG    Confirmed with patient at time of call- N/A Patient is still interested in STM service       Message left for patient to return call    Order settings:  CPAP MIN CPAP MAX   5 cm H2O 15 cm H2O       Device settings:  CPAP MIN CPAP MAX EPR RESMED SOFT RESPONSE SETTING   5 cm  H20 15 cm  H20 2 OFF       Compliance 0 %    Assessment: No usage but has account in [facility]       Action plan: Patient to have 14 day STM visit. Patient has a follow up visit scheduled:   no and not required by insurance    Replacement device: No  STM ordered by provider: Yes     Total time spent on accessing and  interpreting remote patient PAP therapy data  10 minutes    Total time spent counseling, coaching  and reviewing PAP therapy data with patient  1 minutes    [id number] no

## 2023-11-08 ENCOUNTER — DOCUMENTATION ONLY (OUTPATIENT)
Dept: SLEEP MEDICINE | Facility: CLINIC | Age: 28
End: 2023-11-08
Payer: COMMERCIAL

## 2023-11-08 DIAGNOSIS — G47.33 OSA (OBSTRUCTIVE SLEEP APNEA): Primary | ICD-10-CM

## 2023-11-08 NOTE — PROGRESS NOTES
14  DAY STM VISIT    Diagnostic AHI: 49.6 4.3 PSG    Message left for patient to return call.    Assessment: Pt not meeting objective benchmarks for compliance       Action plan: pt to have 30 day STM visit.      Device type: Auto-CPAP    PAP settings: CPAP min 5 cm  H20       CPAP max 15 cm  H20          Mask type:  Per patient choice    Objective measures: 14 day rolling measures           Objective measure goal  Compliance   Goal >70%  Leak   Goal < 24 lpm  AHI  Goal < 5  Usage  Goal >240        Total time spent on accessing and interpreting remote patient PAP therapy data  10 minutes    Total time spent counseling, coaching  and reviewing PAP therapy data with patient  1 minutes    [id number]  [id number]  no (3 day STM)

## 2023-11-10 ENCOUNTER — DOCUMENTATION ONLY (OUTPATIENT)
Dept: SLEEP MEDICINE | Facility: CLINIC | Age: 28
End: 2023-11-10
Payer: COMMERCIAL

## 2023-11-10 ENCOUNTER — IMMUNIZATION (OUTPATIENT)
Dept: NURSING | Facility: CLINIC | Age: 28
End: 2023-11-10
Payer: COMMERCIAL

## 2023-11-10 DIAGNOSIS — G47.33 OSA (OBSTRUCTIVE SLEEP APNEA): Primary | ICD-10-CM

## 2023-11-10 PROCEDURE — 90480 ADMN SARSCOV2 VAC 1/ONLY CMP: CPT

## 2023-11-10 PROCEDURE — 91320 SARSCV2 VAC 30MCG TRS-SUC IM: CPT

## 2023-11-10 NOTE — PROGRESS NOTES
Normal mucosa STM Recheck:  Patient called she is getting a lot of nasal congestion and has minimal use of her CPAP.  Patient is going to call [facility] to do a mask exchange to a full  face mask.

## 2023-11-17 ENCOUNTER — DOCUMENTATION ONLY (OUTPATIENT)
Dept: SLEEP MEDICINE | Facility: CLINIC | Age: 28
End: 2023-11-17
Payer: COMMERCIAL

## 2023-11-17 DIAGNOSIS — G47.33 OSA (OBSTRUCTIVE SLEEP APNEA): Primary | ICD-10-CM

## 2023-11-17 NOTE — PROGRESS NOTES
DATE: 11/17/23  LOCATION OF MASK FITTING: [facility]  REASON FOR MASK FITTING: BREATHING THROUGH MOUTH  DISCUSSED/VIEWED THE FOLLOWING MASKS: AIRTOUCH/FIT F20, AIRFIT N30I    MASK AND SIZE SELECTED: AIRTOUCH F20 MEDIUM  MASK CLARIFICATION NEEDED: N

## 2023-12-21 ENCOUNTER — MYC REFILL (OUTPATIENT)
Dept: INTERNAL MEDICINE | Facility: CLINIC | Age: 28
End: 2023-12-21
Payer: COMMERCIAL

## 2023-12-21 DIAGNOSIS — R10.9 ABDOMINAL CRAMPING: ICD-10-CM

## 2023-12-21 RX ORDER — HYOSCYAMINE SULFATE 0.125 MG
TABLET ORAL
Qty: 30 TABLET | Refills: 1 | Status: SHIPPED | OUTPATIENT
Start: 2023-12-21 | End: 2024-01-04

## 2023-12-23 ENCOUNTER — OFFICE VISIT (OUTPATIENT)
Dept: URGENT CARE | Facility: URGENT CARE | Age: 28
End: 2023-12-23
Payer: COMMERCIAL

## 2023-12-23 VITALS
RESPIRATION RATE: 16 BRPM | WEIGHT: 276.7 LBS | HEART RATE: 88 BPM | SYSTOLIC BLOOD PRESSURE: 136 MMHG | TEMPERATURE: 97.6 F | DIASTOLIC BLOOD PRESSURE: 81 MMHG | BODY MASS INDEX: 40.86 KG/M2 | OXYGEN SATURATION: 95 %

## 2023-12-23 DIAGNOSIS — R10.32 ABDOMINAL PAIN, LEFT LOWER QUADRANT: Primary | ICD-10-CM

## 2023-12-23 PROCEDURE — 99214 OFFICE O/P EST MOD 30 MIN: CPT | Performed by: STUDENT IN AN ORGANIZED HEALTH CARE EDUCATION/TRAINING PROGRAM

## 2023-12-23 NOTE — PROGRESS NOTES
"  Assessment & Plan     Abdominal pain, left lower quadrant  Concern for diverticulitis given patient's history and progressively worsening pain symptoms in left lower quadrant.  - Recommended immediate further evaluation at emergency department with possible CT scan     BMI:   Estimated body mass index is 40.86 kg/m  as calculated from the following:    Height as of 7/14/23: 1.753 m (5' 9\").    Weight as of this encounter: 125.5 kg (276 lb 11.2 oz).     Catherine Espinosa MD  [facility]    Orion Cohn is a 28 year old, presenting for the following health issues:  Diverticulitis (Diverticulitis flare up. )      HPI     Patient reports that she has been having lower abdominal pain on the left lower quadrant 1-2 weeks. Has been taking tylenol at home and now it is no longer helping. She reports that her pain is continually gotten worse over the last week and a half. She does report that it feels exactly like her previous diverticulitis flare.    Patient denies any fevers, nausea, vomiting, diarrhea. She denies any hematochezia or melena. Patient denies any dysuria.    Review of Systems   Constitutional, HEENT, cardiovascular, pulmonary, gi and gu systems are negative, except as otherwise noted.      Objective    /81   Pulse 88   Temp 97.6  F (36.4  C) (Tympanic)   Resp 16   Wt 125.5 kg (276 lb 11.2 oz)   SpO2 95%   BMI 40.86 kg/m    Body mass index is 40.86 kg/m .  Physical Exam   Constitutional: No Acute Distress. Awake and alert  Eyes: anicteric, EOMI, PERRLA  ENT: oropharynx clear, MMM, no Cervical LAD  Respiratory: good air movement, clear to auscultation bilaterally, no crackles or wheezing  Cardiovascular: regular rate and rhythm, normal S1 and S2, no murmur noted  GI: normal bowel sounds, soft, non-distended, moderate tenderness to palpation of the left lower quadrant, otherwise nontender, no masses palpated, no hepatosplenomegaly  Skin: No rashes, or " suspicious lesions  Musculoskeletal: No pedal edema  Neurologic: no focal neurologic deficits appreciated

## 2023-12-23 NOTE — PATIENT INSTRUCTIONS
I recommend you go to the Emergency Department immediately to get further work up for possible diverticulitis. You will likely need a CT for further evaluation.

## 2024-01-02 ENCOUNTER — MYC MEDICAL ADVICE (OUTPATIENT)
Dept: GASTROENTEROLOGY | Facility: CLINIC | Age: 29
End: 2024-01-02
Payer: COMMERCIAL

## 2024-01-03 NOTE — TELEPHONE ENCOUNTER
Tejal Jarrett DO [place], NATHAN Lombardo  Phone Number: [phone]     If pain is still improving can start to slowly advance diet as tolerated.  If worsens or develops fevers/chills will need to go to ER for further evaluation.  Antibiotics are often not indicated for diverticulitis. Can have follow-up with either me or one of the APPs as well.      Pay with a Tweet message sent to patient with the above info from provider.    Munira Wiley RN

## 2024-01-03 NOTE — TELEPHONE ENCOUNTER
Replied to Thrinacia message that her message was forwarded to provider for review.    Munira Wiley RN

## 2024-01-04 ENCOUNTER — MYC REFILL (OUTPATIENT)
Dept: INTERNAL MEDICINE | Facility: CLINIC | Age: 29
End: 2024-01-04
Payer: COMMERCIAL

## 2024-01-04 ENCOUNTER — APPOINTMENT (OUTPATIENT)
Dept: CT IMAGING | Facility: CLINIC | Age: 29
End: 2024-01-04
Attending: EMERGENCY MEDICINE
Payer: COMMERCIAL

## 2024-01-04 ENCOUNTER — HOSPITAL ENCOUNTER (EMERGENCY)
Facility: CLINIC | Age: 29
Discharge: HOME | End: 2024-01-05
Attending: EMERGENCY MEDICINE | Admitting: EMERGENCY MEDICINE
Payer: COMMERCIAL

## 2024-01-04 VITALS
RESPIRATION RATE: 16 BRPM | BODY MASS INDEX: 39.69 KG/M2 | HEART RATE: 98 BPM | SYSTOLIC BLOOD PRESSURE: 135 MMHG | HEIGHT: 69 IN | OXYGEN SATURATION: 99 % | TEMPERATURE: 97.6 F | WEIGHT: 268 LBS | DIASTOLIC BLOOD PRESSURE: 91 MMHG

## 2024-01-04 DIAGNOSIS — R10.9 ABDOMINAL CRAMPING: ICD-10-CM

## 2024-01-04 DIAGNOSIS — K57.92 ACUTE DIVERTICULITIS: Primary | ICD-10-CM

## 2024-01-04 LAB
ALBUMIN SERPL BCG-MCNC: 4 G/DL (ref 3.5–5.2)
ALBUMIN UR-MCNC: 10 MG/DL
ALP SERPL-CCNC: 97 U/L (ref 40–150)
ALT SERPL W P-5'-P-CCNC: 32 U/L (ref 0–50)
ANION GAP SERPL CALCULATED.3IONS-SCNC: 9 MMOL/L (ref 7–15)
APPEARANCE UR: ABNORMAL
AST SERPL W P-5'-P-CCNC: 26 U/L (ref 0–45)
BASOPHILS # BLD AUTO: 0 10E3/UL (ref 0–0.2)
BASOPHILS NFR BLD AUTO: 0 %
BILIRUB SERPL-MCNC: 0.4 MG/DL
BILIRUB UR QL STRIP: NEGATIVE
BUN SERPL-MCNC: 4.9 MG/DL (ref 6–20)
CALCIUM SERPL-MCNC: 9.6 MG/DL (ref 8.6–10)
CHLORIDE SERPL-SCNC: 99 MMOL/L (ref 98–107)
COLOR UR AUTO: YELLOW
CREAT SERPL-MCNC: 0.54 MG/DL (ref 0.51–0.95)
DEPRECATED HCO3 PLAS-SCNC: 28 MMOL/L (ref 22–29)
EGFRCR SERPLBLD CKD-EPI 2021: >90 ML/MIN/1.73M2
EOSINOPHIL # BLD AUTO: 0.1 10E3/UL (ref 0–0.7)
EOSINOPHIL NFR BLD AUTO: 1 %
ERYTHROCYTE [DISTWIDTH] IN BLOOD BY AUTOMATED COUNT: 13.2 % (ref 10–15)
GLUCOSE SERPL-MCNC: 86 MG/DL (ref 70–99)
GLUCOSE UR STRIP-MCNC: NEGATIVE MG/DL
HCG UR QL: NEGATIVE
HCT VFR BLD AUTO: 36 % (ref 35–47)
HGB BLD-MCNC: 11.5 G/DL (ref 11.7–15.7)
HGB UR QL STRIP: NEGATIVE
HOLD SPECIMEN: NORMAL
IMM GRANULOCYTES # BLD: 0 10E3/UL
IMM GRANULOCYTES NFR BLD: 0 %
KETONES UR STRIP-MCNC: 20 MG/DL
LEUKOCYTE ESTERASE UR QL STRIP: ABNORMAL
LYMPHOCYTES # BLD AUTO: 2.1 10E3/UL (ref 0.8–5.3)
LYMPHOCYTES NFR BLD AUTO: 18 %
MCH RBC QN AUTO: 27.2 PG (ref 26.5–33)
MCHC RBC AUTO-ENTMCNC: 31.9 G/DL (ref 31.5–36.5)
MCV RBC AUTO: 85 FL (ref 78–100)
MONOCYTES # BLD AUTO: 0.8 10E3/UL (ref 0–1.3)
MONOCYTES NFR BLD AUTO: 7 %
MUCOUS THREADS #/AREA URNS LPF: PRESENT /LPF
NEUTROPHILS # BLD AUTO: 8.3 10E3/UL (ref 1.6–8.3)
NEUTROPHILS NFR BLD AUTO: 74 %
NITRATE UR QL: NEGATIVE
NRBC # BLD AUTO: 0 10E3/UL
NRBC BLD AUTO-RTO: 0 /100
PH UR STRIP: 7 [PH] (ref 5–7)
PLATELET # BLD AUTO: 421 10E3/UL (ref 150–450)
POTASSIUM SERPL-SCNC: 3.9 MMOL/L (ref 3.4–5.3)
PROT SERPL-MCNC: 8.1 G/DL (ref 6.4–8.3)
RBC # BLD AUTO: 4.23 10E6/UL (ref 3.8–5.2)
RBC URINE: 2 /HPF
SODIUM SERPL-SCNC: 136 MMOL/L (ref 135–145)
SP GR UR STRIP: 1.02 (ref 1–1.03)
SQUAMOUS EPITHELIAL: 3 /HPF
UROBILINOGEN UR STRIP-MCNC: NORMAL MG/DL
WBC # BLD AUTO: 11.3 10E3/UL (ref 4–11)
WBC URINE: 2 /HPF

## 2024-01-04 PROCEDURE — 96360 HYDRATION IV INFUSION INIT: CPT | Mod: 59

## 2024-01-04 PROCEDURE — 81025 URINE PREGNANCY TEST: CPT | Performed by: EMERGENCY MEDICINE

## 2024-01-04 PROCEDURE — 96361 HYDRATE IV INFUSION ADD-ON: CPT

## 2024-01-04 PROCEDURE — 85025 COMPLETE CBC W/AUTO DIFF WBC: CPT | Performed by: EMERGENCY MEDICINE

## 2024-01-04 PROCEDURE — 250N000009 HC RX 250: Performed by: EMERGENCY MEDICINE

## 2024-01-04 PROCEDURE — 81001 URINALYSIS AUTO W/SCOPE: CPT | Performed by: EMERGENCY MEDICINE

## 2024-01-04 PROCEDURE — 80053 COMPREHEN METABOLIC PANEL: CPT | Performed by: EMERGENCY MEDICINE

## 2024-01-04 PROCEDURE — 74177 CT ABD & PELVIS W/CONTRAST: CPT

## 2024-01-04 PROCEDURE — 36415 COLL VENOUS BLD VENIPUNCTURE: CPT | Performed by: EMERGENCY MEDICINE

## 2024-01-04 PROCEDURE — 250N000013 HC RX MED GY IP 250 OP 250 PS 637: Performed by: EMERGENCY MEDICINE

## 2024-01-04 PROCEDURE — 258N000003 HC RX IP 258 OP 636: Performed by: EMERGENCY MEDICINE

## 2024-01-04 PROCEDURE — 250N000011 HC RX IP 250 OP 636: Performed by: EMERGENCY MEDICINE

## 2024-01-04 PROCEDURE — 99285 EMERGENCY DEPT VISIT HI MDM: CPT | Mod: 25

## 2024-01-04 RX ORDER — IOPAMIDOL 755 MG/ML
135 INJECTION, SOLUTION INTRAVASCULAR ONCE
Status: COMPLETED | OUTPATIENT
Start: 2024-01-04 | End: 2024-01-04

## 2024-01-04 RX ORDER — HYOSCYAMINE SULFATE 0.125 MG
TABLET ORAL
Qty: 30 TABLET | Refills: 1 | Status: SHIPPED | OUTPATIENT
Start: 2024-01-04 | End: 2024-01-24

## 2024-01-04 RX ADMIN — SODIUM CHLORIDE 79 ML: 9 INJECTION, SOLUTION INTRAVENOUS at 22:29

## 2024-01-04 RX ADMIN — SODIUM CHLORIDE 1000 ML: 9 INJECTION, SOLUTION INTRAVENOUS at 22:20

## 2024-01-04 RX ADMIN — IOPAMIDOL 135 ML: 755 INJECTION, SOLUTION INTRAVENOUS at 22:29

## 2024-01-04 RX ADMIN — AMOXICILLIN AND CLAVULANATE POTASSIUM 1 TABLET: 875; 125 TABLET, FILM COATED ORAL at 23:56

## 2024-01-04 ASSESSMENT — ENCOUNTER SYMPTOMS
COUGH: 0
HEMATURIA: 0
ABDOMINAL PAIN: 1
HEADACHES: 0
DYSURIA: 0
NECK PAIN: 0
BLOOD IN STOOL: 1
FEVER: 0
RHINORRHEA: 0
CHILLS: 1
VOMITING: 0
SHORTNESS OF BREATH: 0
NAUSEA: 0
DIZZINESS: 0
BACK PAIN: 0

## 2024-01-04 ASSESSMENT — ACTIVITIES OF DAILY LIVING (ADL)
ADLS_ACUITY_SCORE: 35
ADLS_ACUITY_SCORE: 33

## 2024-01-05 NOTE — ED PROVIDER NOTES
History     Chief Complaint:  Abdominal Pain       HPI   Misty Serrano is a 28 year old female with history of diverticulitis presents to the emergency department with lower abdominal pain.  Patient states that she has had 2 to 3 weeks history of lower abdominal pain in which she describes it as a dull ache.  Patient has history of diverticulitis.  Patient states that initially she has been managing this with Tylenol, but over the past few days, she has transitioned to a clear liquid diet, and started advancing to low fiber diet per her GI specialist yesterday, but notes significant worsening of her pain this morning when she woke up rating it an 8 out of 10.  Patient states that the pain has radiated from her left-sided abdomen across her lower abdomen.  Patient has associated mild blood in stools.      Review of Systems   Constitutional:  Positive for chills. Negative for fever.   HENT:  Negative for congestion and rhinorrhea.    Respiratory:  Negative for cough and shortness of breath.    Cardiovascular:  Negative for chest pain.   Gastrointestinal:  Positive for abdominal pain and blood in stool. Negative for nausea and vomiting.   Genitourinary:  Negative for dysuria and hematuria.   Musculoskeletal:  Negative for back pain and neck pain.   Neurological:  Negative for dizziness and headaches.       Independent Historian:   None - Patient Only    Review of External Notes:   12/23/23 UC Visit note - patient was recommend at that time to go to ED for CT imaging    Medications:    amoxicillin-clavulanate (AUGMENTIN) 875-125 MG tablet  amLODIPine-valsartan (EXFORGE) 5-160 MG tablet  Etonogestrel (NEXPLANON SC)  hyoscyamine (LEVSIN) 0.125 MG tablet        Past Medical History:    Past Medical History:   Diagnosis Date    Benign essential hypertension     FRANCES (generalized anxiety disorder)     History of diverticulitis     Impaired fasting glucose     Morbid obesity (H)     Persistent depressive disorder   "      Past Surgical History:    Past Surgical History:   Procedure Laterality Date    COLONOSCOPY N/A 8/13/2021    Procedure: Colonoscopy, With Polypectomy And Biopsy;  Surgeon: Tejal Jarrett DO;  Location: [facility] OR    COLONOSCOPY WITH CO2 INSUFFLATION N/A 8/13/2021    Procedure: COLONOSCOPY, WITH CO2 INSUFFLATION;  Surgeon: Tejal Jarrett DO;  Location: [facility] OR    NO HISTORY OF SURGERY          Physical Exam   Patient Vitals for the past 24 hrs:   BP Temp Temp src Pulse Resp SpO2 Height Weight   01/04/24 2209 (!) 135/91 97.6  F (36.4  C) Oral 98 16 99 % -- --   01/04/24 1950 (!) 166/104 -- -- (!) 122 -- -- 1.753 m (5' 9\") 121.6 kg (268 lb)   01/04/24 1923 (!) 158/81 98.2  F (36.8  C) Temporal 110 16 97 % 1.753 m (5' 9\") --      Constitutional:       General: Not in acute distress.        Appearance: Normal appearance.   HENT:      Head: Normocephalic and atraumatic.   Eyes:      Extraocular Movements: Extraocular movements intact.      Conjunctiva/sclera: Conjunctivae normal.   Cardiovascular:      Rate and Rhythm: Normal rate and regular rhythm.   Pulmonary:      Effort: Pulmonary effort is normal. No respiratory distress.      Breath sounds: Normal breath sounds.   Abdominal:      General: Abdomen is flat. There is no distension.      Palpations: Abdomen is soft.      Tenderness: There is no abdominal tenderness.   Musculoskeletal:      Cervical back: Normal range of motion. No rigidity.      Right lower leg: No edema.      Left lower leg: No edema.   Skin:     General: Skin is warm and dry.   Neurological:      General: No focal deficit present.      Mental Status: Alert and oriented to person, place, and time.   Psychiatric:         Mood and Affect: Mood normal.         Behavior: Behavior normal.    Emergency Department Course     Imaging:  CT Abdomen Pelvis w Contrast   Final Result   IMPRESSION:    1.  Prominent inflamed appearance involving proximal one half of sigmoid colon somewhat atypical for both " diverticulitis or regional colitis but diverticula are present and certainly a recurrent or somewhat indolent diverticulitis could have this    appearance.   2.  However, given the typical appearance of an associated lymph nodes, consider direct optical evaluation once patient's acute symptoms have cleared in order to exclude other pathology involving the sigmoid.         Report per radiology    Laboratory:  Labs Ordered and Resulted from Time of ED Arrival to Time of ED Departure   COMPREHENSIVE METABOLIC PANEL - Abnormal       Result Value    Sodium 136      Potassium 3.9      Carbon Dioxide (CO2) 28      Anion Gap 9      Urea Nitrogen 4.9 (*)     Creatinine 0.54      GFR Estimate >90      Calcium 9.6      Chloride 99      Glucose 86      Alkaline Phosphatase 97      AST 26      ALT 32      Protein Total 8.1      Albumin 4.0      Bilirubin Total 0.4     ROUTINE UA WITH MICROSCOPIC REFLEX TO CULTURE - Abnormal    Color Urine Yellow      Appearance Urine Slightly Cloudy (*)     Glucose Urine Negative      Bilirubin Urine Negative      Ketones Urine 20 (*)     Specific Gravity Urine 1.021      Blood Urine Negative      pH Urine 7.0      Protein Albumin Urine 10 (*)     Urobilinogen Urine Normal      Nitrite Urine Negative      Leukocyte Esterase Urine Trace (*)     Mucus Urine Present (*)     RBC Urine 2      WBC Urine 2      Squamous Epithelials Urine 3 (*)    CBC WITH PLATELETS AND DIFFERENTIAL - Abnormal    WBC Count 11.3 (*)     RBC Count 4.23      Hemoglobin 11.5 (*)     Hematocrit 36.0      MCV 85      MCH 27.2      MCHC 31.9      RDW 13.2      Platelet Count 421      % Neutrophils 74      % Lymphocytes 18      % Monocytes 7      % Eosinophils 1      % Basophils 0      % Immature Granulocytes 0      NRBCs per 100 WBC 0      Absolute Neutrophils 8.3      Absolute Lymphocytes 2.1      Absolute Monocytes 0.8      Absolute Eosinophils 0.1      Absolute Basophils 0.0      Absolute Immature Granulocytes 0.0       Absolute NRBCs 0.0     HCG QUALITATIVE URINE - Normal    hCG Urine Qualitative Negative          Emergency Department Course & Assessments:       Interventions:  Medications   sodium chloride 0.9% BOLUS 1,000 mL (0 mLs Intravenous Stopped 24)   iopamidol (ISOVUE-370) solution 135 mL (135 mLs Intravenous $Given 24)   Saline Flush (79 mLs Intravenous $Given 24)   amoxicillin-clavulanate (AUGMENTIN) 875-125 MG per tablet 1 tablet (1 tablet Oral $Given 24)        Independent Interpretation (X-rays, CTs, rhythm strip):  None    Assessments/Consultations/Discussion of Management or Tests:  ED Course as of 24 0304   Thu  Pulse: 98  Improved   2350 Patient updated on CT findings including lymph node findings.  While atypical, symptoms today likely secondary to recurrent diverticulitis given patient's reported history.  Will prescribe Augmentin for symptom management.  Advised for patient to follow-up with her gastroenterologist for reevaluation and potential repeat colonoscopy based on CT findings today.  Discussed strict return precautions.  Answered all questions.  Patient voiced understanding and agreement with plan.       Social Determinants of Health affecting care:   None    Disposition:  The patient was discharged to home.     Impression & Plan    CMS Diagnoses: None    Medical Decision Makin-year-old female as described above presents to the emergency department for lower abdominal pain ongoing for the past 2 to 3 weeks, but worsening this morning.  Patient hemodynamically stable at time of evaluation.  Nonseptic appearing.  Nontoxic-appearing.  Initially mildly tachycardic, but that has resolved while in the ER without intervention.  No significant reproducible pain on palpation of the abdomen.  Benign abdominal exam that does not appear peritonitic.  Mild leukocytosis of 11.3.  History of diverticulitis.  Broad differential diagnosis considered  includes, but not limited to, acute diverticulitis, infectious colitis, bowel obstruction, ovarian torsion, nephrolithiasis/ureteral colic, pyelonephritis, and urinary tract infection.  Abdominal pain lab work ordered from triage otherwise unremarkable at this time.  Will obtain CT abdomen pelvis with contrast for further evaluation of above discussed differentials.  Discussed care plan with patient who voiced understanding and agreement with plan.  Answered all questions.  Additional workup and orders as listed in chart.    Please refer to ED course above as part of continuation of MDM for details on the patient's treatment course and any changes or updates in care plan beyond my initial evaluation and MDM creation.      Diagnosis:    ICD-10-CM    1. Acute diverticulitis  K57.92            Discharge Medications:  Discharge Medication List as of 1/4/2024 11:57 PM        START taking these medications    Details   amoxicillin-clavulanate (AUGMENTIN) 875-125 MG tablet Take 1 tablet by mouth 2 times daily for 7 days, Disp-14 tablet, R-0, E-Prescribe              AKIKO DOYLE DO  1/4/2024   Akiko Doyle DO Yeh, Ferris, DO  01/05/24 0302

## 2024-01-05 NOTE — ED TRIAGE NOTES
Patient has been having intermittent abdomen pain for the last 3 weeks that over the last 24 hours the pain has been increasing. Patient states that today she noticed some bright red blood in her stool. Denies urinary symptoms.      Triage Assessment (Adult)       Row Name 01/04/24 1952          Triage Assessment    Airway WDL WDL        Respiratory WDL    Respiratory WDL WDL        Skin Circulation/Temperature WDL    Skin Circulation/Temperature WDL WDL        Cardiac WDL    Cardiac WDL X;rhythm     Cardiac Rhythm ST        Peripheral/Neurovascular WDL    Peripheral Neurovascular WDL WDL        Cognitive/Neuro/Behavioral WDL    Cognitive/Neuro/Behavioral WDL WDL

## 2024-01-24 ENCOUNTER — MYC REFILL (OUTPATIENT)
Dept: INTERNAL MEDICINE | Facility: CLINIC | Age: 29
End: 2024-01-24
Payer: COMMERCIAL

## 2024-01-24 DIAGNOSIS — R10.9 ABDOMINAL CRAMPING: ICD-10-CM

## 2024-01-25 ENCOUNTER — APPOINTMENT (OUTPATIENT)
Dept: CT IMAGING | Facility: CLINIC | Age: 29
DRG: 392 | End: 2024-01-25
Attending: EMERGENCY MEDICINE
Payer: COMMERCIAL

## 2024-01-25 ENCOUNTER — HOSPITAL ENCOUNTER (INPATIENT)
Facility: CLINIC | Age: 29
LOS: 4 days | Discharge: HOME | DRG: 392 | End: 2024-01-29
Attending: EMERGENCY MEDICINE | Admitting: HOSPITALIST
Payer: COMMERCIAL

## 2024-01-25 DIAGNOSIS — K57.92 DIVERTICULITIS: ICD-10-CM

## 2024-01-25 DIAGNOSIS — R10.32 ABDOMINAL PAIN, LEFT LOWER QUADRANT: ICD-10-CM

## 2024-01-25 LAB
ALBUMIN SERPL BCG-MCNC: 3.9 G/DL (ref 3.5–5.2)
ALBUMIN UR-MCNC: 20 MG/DL
ALP SERPL-CCNC: 78 U/L (ref 40–150)
ALT SERPL W P-5'-P-CCNC: 34 U/L (ref 0–50)
ANION GAP SERPL CALCULATED.3IONS-SCNC: 11 MMOL/L (ref 7–15)
APPEARANCE UR: CLEAR
AST SERPL W P-5'-P-CCNC: 18 U/L (ref 0–45)
BASOPHILS # BLD AUTO: 0 10E3/UL (ref 0–0.2)
BASOPHILS NFR BLD AUTO: 0 %
BILIRUB SERPL-MCNC: 0.5 MG/DL
BILIRUB UR QL STRIP: NEGATIVE
BUN SERPL-MCNC: 4.1 MG/DL (ref 6–20)
CALCIUM SERPL-MCNC: 9.6 MG/DL (ref 8.6–10)
CHLORIDE SERPL-SCNC: 101 MMOL/L (ref 98–107)
COLOR UR AUTO: YELLOW
CREAT SERPL-MCNC: 0.59 MG/DL (ref 0.51–0.95)
DEPRECATED HCO3 PLAS-SCNC: 28 MMOL/L (ref 22–29)
EGFRCR SERPLBLD CKD-EPI 2021: >90 ML/MIN/1.73M2
EOSINOPHIL # BLD AUTO: 0.1 10E3/UL (ref 0–0.7)
EOSINOPHIL NFR BLD AUTO: 0 %
ERYTHROCYTE [DISTWIDTH] IN BLOOD BY AUTOMATED COUNT: 13.4 % (ref 10–15)
GLUCOSE SERPL-MCNC: 101 MG/DL (ref 70–99)
GLUCOSE UR STRIP-MCNC: NEGATIVE MG/DL
HCT VFR BLD AUTO: 34.8 % (ref 35–47)
HGB BLD-MCNC: 10.7 G/DL (ref 11.7–15.7)
HGB UR QL STRIP: ABNORMAL
IMM GRANULOCYTES # BLD: 0.1 10E3/UL
IMM GRANULOCYTES NFR BLD: 0 %
KETONES UR STRIP-MCNC: 10 MG/DL
LACTATE SERPL-SCNC: 1.9 MMOL/L (ref 0.7–2)
LEUKOCYTE ESTERASE UR QL STRIP: NEGATIVE
LYMPHOCYTES # BLD AUTO: 1.3 10E3/UL (ref 0.8–5.3)
LYMPHOCYTES NFR BLD AUTO: 8 %
MCH RBC QN AUTO: 26.2 PG (ref 26.5–33)
MCHC RBC AUTO-ENTMCNC: 30.7 G/DL (ref 31.5–36.5)
MCV RBC AUTO: 85 FL (ref 78–100)
MONOCYTES # BLD AUTO: 1.2 10E3/UL (ref 0–1.3)
MONOCYTES NFR BLD AUTO: 8 %
MUCOUS THREADS #/AREA URNS LPF: PRESENT /LPF
NEUTROPHILS # BLD AUTO: 13.3 10E3/UL (ref 1.6–8.3)
NEUTROPHILS NFR BLD AUTO: 84 %
NITRATE UR QL: NEGATIVE
NRBC # BLD AUTO: 0 10E3/UL
NRBC BLD AUTO-RTO: 0 /100
PH UR STRIP: 6 [PH] (ref 5–7)
PLATELET # BLD AUTO: 431 10E3/UL (ref 150–450)
POTASSIUM SERPL-SCNC: 4.1 MMOL/L (ref 3.4–5.3)
PROT SERPL-MCNC: 7.9 G/DL (ref 6.4–8.3)
RBC # BLD AUTO: 4.08 10E6/UL (ref 3.8–5.2)
RBC URINE: 5 /HPF
SODIUM SERPL-SCNC: 140 MMOL/L (ref 135–145)
SP GR UR STRIP: 1.02 (ref 1–1.03)
SQUAMOUS EPITHELIAL: 1 /HPF
UROBILINOGEN UR STRIP-MCNC: NORMAL MG/DL
WBC # BLD AUTO: 15.9 10E3/UL (ref 4–11)
WBC URINE: 3 /HPF

## 2024-01-25 PROCEDURE — 85025 COMPLETE CBC W/AUTO DIFF WBC: CPT | Performed by: EMERGENCY MEDICINE

## 2024-01-25 PROCEDURE — 258N000003 HC RX IP 258 OP 636: Performed by: EMERGENCY MEDICINE

## 2024-01-25 PROCEDURE — 99418 PROLNG IP/OBS E/M EA 15 MIN: CPT

## 2024-01-25 PROCEDURE — 87040 BLOOD CULTURE FOR BACTERIA: CPT | Performed by: EMERGENCY MEDICINE

## 2024-01-25 PROCEDURE — 99223 1ST HOSP IP/OBS HIGH 75: CPT

## 2024-01-25 PROCEDURE — 36415 COLL VENOUS BLD VENIPUNCTURE: CPT | Performed by: EMERGENCY MEDICINE

## 2024-01-25 PROCEDURE — 250N000011 HC RX IP 250 OP 636: Performed by: EMERGENCY MEDICINE

## 2024-01-25 PROCEDURE — 83605 ASSAY OF LACTIC ACID: CPT | Performed by: EMERGENCY MEDICINE

## 2024-01-25 PROCEDURE — 80053 COMPREHEN METABOLIC PANEL: CPT | Performed by: EMERGENCY MEDICINE

## 2024-01-25 PROCEDURE — 96376 TX/PRO/DX INJ SAME DRUG ADON: CPT

## 2024-01-25 PROCEDURE — 96375 TX/PRO/DX INJ NEW DRUG ADDON: CPT

## 2024-01-25 PROCEDURE — 250N000013 HC RX MED GY IP 250 OP 250 PS 637: Performed by: EMERGENCY MEDICINE

## 2024-01-25 PROCEDURE — 81001 URINALYSIS AUTO W/SCOPE: CPT | Performed by: EMERGENCY MEDICINE

## 2024-01-25 PROCEDURE — 250N000009 HC RX 250: Performed by: EMERGENCY MEDICINE

## 2024-01-25 PROCEDURE — 96361 HYDRATE IV INFUSION ADD-ON: CPT

## 2024-01-25 PROCEDURE — 96365 THER/PROPH/DIAG IV INF INIT: CPT

## 2024-01-25 PROCEDURE — 120N000001 HC R&B MED SURG/OB

## 2024-01-25 PROCEDURE — 74177 CT ABD & PELVIS W/CONTRAST: CPT

## 2024-01-25 PROCEDURE — 99285 EMERGENCY DEPT VISIT HI MDM: CPT | Mod: 25

## 2024-01-25 RX ORDER — AMOXICILLIN 250 MG
1 CAPSULE ORAL 2 TIMES DAILY PRN
Status: DISCONTINUED | OUTPATIENT
Start: 2024-01-25 | End: 2024-01-28

## 2024-01-25 RX ORDER — HYOSCYAMINE SULFATE 0.125 MG
TABLET ORAL
Qty: 30 TABLET | Refills: 1 | Status: SHIPPED | OUTPATIENT
Start: 2024-01-25 | End: 2024-03-17

## 2024-01-25 RX ORDER — LIDOCAINE 40 MG/G
CREAM TOPICAL
Status: DISCONTINUED | OUTPATIENT
Start: 2024-01-25 | End: 2024-01-29 | Stop reason: HOSPADM

## 2024-01-25 RX ORDER — ACETAMINOPHEN 325 MG/1
2 TABLET ORAL 3 TIMES DAILY PRN
Status: ON HOLD | COMMUNITY
End: 2024-03-30

## 2024-01-25 RX ORDER — ONDANSETRON 4 MG/1
4 TABLET, ORALLY DISINTEGRATING ORAL EVERY 6 HOURS PRN
Status: DISCONTINUED | OUTPATIENT
Start: 2024-01-25 | End: 2024-01-29 | Stop reason: HOSPADM

## 2024-01-25 RX ORDER — AMOXICILLIN 250 MG
2 CAPSULE ORAL 2 TIMES DAILY PRN
Status: DISCONTINUED | OUTPATIENT
Start: 2024-01-25 | End: 2024-01-28

## 2024-01-25 RX ORDER — ACETAMINOPHEN 325 MG/1
650 TABLET ORAL EVERY 4 HOURS PRN
Status: DISCONTINUED | OUTPATIENT
Start: 2024-01-25 | End: 2024-01-29 | Stop reason: HOSPADM

## 2024-01-25 RX ORDER — LACTOBACILLUS RHAMNOSUS GG 10B CELL
1 CAPSULE ORAL DAILY PRN
COMMUNITY
End: 2024-04-23

## 2024-01-25 RX ORDER — KETOROLAC TROMETHAMINE 15 MG/ML
15 INJECTION, SOLUTION INTRAMUSCULAR; INTRAVENOUS ONCE
Status: COMPLETED | OUTPATIENT
Start: 2024-01-25 | End: 2024-01-25

## 2024-01-25 RX ORDER — SODIUM CHLORIDE 9 MG/ML
INJECTION, SOLUTION INTRAVENOUS CONTINUOUS
Status: DISCONTINUED | OUTPATIENT
Start: 2024-01-25 | End: 2024-01-28

## 2024-01-25 RX ORDER — MORPHINE SULFATE 4 MG/ML
4 INJECTION, SOLUTION INTRAMUSCULAR; INTRAVENOUS ONCE
Status: COMPLETED | OUTPATIENT
Start: 2024-01-25 | End: 2024-01-25

## 2024-01-25 RX ORDER — PIPERACILLIN SODIUM, TAZOBACTAM SODIUM 3; .375 G/15ML; G/15ML
3.38 INJECTION, POWDER, LYOPHILIZED, FOR SOLUTION INTRAVENOUS EVERY 6 HOURS
Status: DISCONTINUED | OUTPATIENT
Start: 2024-01-25 | End: 2024-01-25

## 2024-01-25 RX ORDER — ACETAMINOPHEN 500 MG
1000 TABLET ORAL ONCE
Status: COMPLETED | OUTPATIENT
Start: 2024-01-25 | End: 2024-01-25

## 2024-01-25 RX ORDER — PIPERACILLIN SODIUM, TAZOBACTAM SODIUM 4; .5 G/20ML; G/20ML
4.5 INJECTION, POWDER, LYOPHILIZED, FOR SOLUTION INTRAVENOUS EVERY 6 HOURS
Status: DISCONTINUED | OUTPATIENT
Start: 2024-01-25 | End: 2024-01-25

## 2024-01-25 RX ORDER — NALOXONE HYDROCHLORIDE 0.4 MG/ML
0.4 INJECTION, SOLUTION INTRAMUSCULAR; INTRAVENOUS; SUBCUTANEOUS
Status: DISCONTINUED | OUTPATIENT
Start: 2024-01-25 | End: 2024-01-29 | Stop reason: HOSPADM

## 2024-01-25 RX ORDER — NALOXONE HYDROCHLORIDE 0.4 MG/ML
0.2 INJECTION, SOLUTION INTRAMUSCULAR; INTRAVENOUS; SUBCUTANEOUS
Status: DISCONTINUED | OUTPATIENT
Start: 2024-01-25 | End: 2024-01-29 | Stop reason: HOSPADM

## 2024-01-25 RX ORDER — IOPAMIDOL 755 MG/ML
135 INJECTION, SOLUTION INTRAVASCULAR ONCE
Status: COMPLETED | OUTPATIENT
Start: 2024-01-25 | End: 2024-01-25

## 2024-01-25 RX ORDER — ACETAMINOPHEN 650 MG/1
650 SUPPOSITORY RECTAL EVERY 4 HOURS PRN
Status: DISCONTINUED | OUTPATIENT
Start: 2024-01-25 | End: 2024-01-29 | Stop reason: HOSPADM

## 2024-01-25 RX ORDER — PIPERACILLIN SODIUM, TAZOBACTAM SODIUM 4; .5 G/20ML; G/20ML
4.5 INJECTION, POWDER, LYOPHILIZED, FOR SOLUTION INTRAVENOUS EVERY 6 HOURS
Status: DISCONTINUED | OUTPATIENT
Start: 2024-01-25 | End: 2024-01-26

## 2024-01-25 RX ORDER — PIPERACILLIN SODIUM, TAZOBACTAM SODIUM 4; .5 G/20ML; G/20ML
4.5 INJECTION, POWDER, LYOPHILIZED, FOR SOLUTION INTRAVENOUS ONCE
Status: COMPLETED | OUTPATIENT
Start: 2024-01-25 | End: 2024-01-25

## 2024-01-25 RX ORDER — MORPHINE SULFATE 2 MG/ML
2 INJECTION, SOLUTION INTRAMUSCULAR; INTRAVENOUS
Status: DISCONTINUED | OUTPATIENT
Start: 2024-01-25 | End: 2024-01-28

## 2024-01-25 RX ORDER — ONDANSETRON 2 MG/ML
4 INJECTION INTRAMUSCULAR; INTRAVENOUS EVERY 6 HOURS PRN
Status: DISCONTINUED | OUTPATIENT
Start: 2024-01-25 | End: 2024-01-29 | Stop reason: HOSPADM

## 2024-01-25 RX ORDER — MORPHINE SULFATE 2 MG/ML
1 INJECTION, SOLUTION INTRAMUSCULAR; INTRAVENOUS
Status: DISCONTINUED | OUTPATIENT
Start: 2024-01-25 | End: 2024-01-28

## 2024-01-25 RX ADMIN — ACETAMINOPHEN 1000 MG: 500 TABLET, FILM COATED ORAL at 16:37

## 2024-01-25 RX ADMIN — SODIUM CHLORIDE 1000 ML: 9 INJECTION, SOLUTION INTRAVENOUS at 18:07

## 2024-01-25 RX ADMIN — PIPERACILLIN AND TAZOBACTAM 4.5 G: 4; .5 INJECTION, POWDER, FOR SOLUTION INTRAVENOUS at 16:50

## 2024-01-25 RX ADMIN — SODIUM CHLORIDE 79 ML: 9 INJECTION, SOLUTION INTRAVENOUS at 17:34

## 2024-01-25 RX ADMIN — MORPHINE SULFATE 4 MG: 4 INJECTION, SOLUTION INTRAMUSCULAR; INTRAVENOUS at 19:21

## 2024-01-25 RX ADMIN — SODIUM CHLORIDE 1000 ML: 9 INJECTION, SOLUTION INTRAVENOUS at 16:45

## 2024-01-25 RX ADMIN — KETOROLAC TROMETHAMINE 15 MG: 15 INJECTION, SOLUTION INTRAMUSCULAR; INTRAVENOUS at 20:04

## 2024-01-25 RX ADMIN — MORPHINE SULFATE 4 MG: 4 INJECTION, SOLUTION INTRAMUSCULAR; INTRAVENOUS at 16:46

## 2024-01-25 RX ADMIN — IOPAMIDOL 135 ML: 755 INJECTION, SOLUTION INTRAVENOUS at 17:33

## 2024-01-25 ASSESSMENT — ACTIVITIES OF DAILY LIVING (ADL)
ADLS_ACUITY_SCORE: 35
ADLS_ACUITY_SCORE: 35
ADLS_ACUITY_SCORE: 18
ADLS_ACUITY_SCORE: 35

## 2024-01-25 NOTE — ED PROVIDER NOTES
History     Chief Complaint:  Abdominal Pain    The history is provided by the patient.      Misty Serrano is a 28 year old female with a history of diverticulitis who presents to the ED for evaluation of abdominal pain. 3 weeks ago, the patient came into the ER for lower abdominal pain. Patient reports she was diagnosed with diverticulitis and colitis. She was prescribed antibiotics, which she has now finished. The abdominal pain is worse again today, which prompted her presentation to the ED. She states that the pain is worse on the left side. She notes chills and nausea, but no vomiting. Additionally, she hasn't had much to eat today. Patient denies having any bloody stools since she started antibiotics. She reports that she has had some loose stools that sometimes appears a mucous white color.     Independent Historian:   None - Patient Only    Review of External Notes:   Reviewed patient's visit from 1/4/2024, she was diagnosed with diverticulitis, placed on Augmentin for 7 days.    Medications:    Amlodipine-valsartan  Etonogestrel  Hyoscyamine  Cetirizine  Budesonide    Past Medical History:    Hypertension   FRANCES  Diverticulitis   Impaired fasting glucose  Morbid obesity  Persistent depressive disorder  BRENDEN    Past Surgical History:    Colonoscopy     Physical Exam   Patient Vitals for the past 24 hrs:   BP Temp Temp src Pulse Resp SpO2   01/25/24 1921 122/79 (!) 101.6  F (38.7  C) Oral 108 20 97 %   01/25/24 1907 -- -- -- -- -- 97 %   01/25/24 1811 -- -- -- -- -- 96 %   01/25/24 1803 116/76 -- -- 64 -- 92 %   01/25/24 1558 (!) 147/82 (!) 101.3  F (38.5  C) Temporal 117 16 99 %     Physical Exam  General: Well-nourished, resting comfortably when I enter the room  Eyes: Pupils equal, conjunctivae pink no scleral icterus or conjunctival injection  ENT:  Moist mucus membranes  Respiratory:  Lungs clear to auscultation bilaterally, no crackles/rubs/wheezes.  Good air movement  CV: Normal rate and rhythm, no  murmurs  GI:  Abdomen soft and non-distended.  No guarding or rebound.  Tenderness to palpation in the left lower quadrant.  Skin: Warm, dry.  No rashes or petechiae  Musculoskeletal: No peripheral edema or calf tenderness  Neuro: Alert and oriented to person/place/time  Psychiatric: Normal affect    Emergency Department Course   Imaging:  Abd/pelvis CT,  IV  contrast only TRAUMA / AAA   Final Result   IMPRESSION:    1.  Interval worsening of proximal sigmoid colon diverticulitis with interval development of small fluid and gas-filled sinus tracts extending into the sigmoid mesenteric fat, one of which extends to the left ovary. No drainable abscess.        Laboratory:  Labs Ordered and Resulted from Time of ED Arrival to Time of ED Departure   COMPREHENSIVE METABOLIC PANEL - Abnormal       Result Value    Sodium 140      Potassium 4.1      Carbon Dioxide (CO2) 28      Anion Gap 11      Urea Nitrogen 4.1 (*)     Creatinine 0.59      GFR Estimate >90      Calcium 9.6      Chloride 101      Glucose 101 (*)     Alkaline Phosphatase 78      AST 18      ALT 34      Protein Total 7.9      Albumin 3.9      Bilirubin Total 0.5     CBC WITH PLATELETS AND DIFFERENTIAL - Abnormal    WBC Count 15.9 (*)     RBC Count 4.08      Hemoglobin 10.7 (*)     Hematocrit 34.8 (*)     MCV 85      MCH 26.2 (*)     MCHC 30.7 (*)     RDW 13.4      Platelet Count 431      % Neutrophils 84      % Lymphocytes 8      % Monocytes 8      % Eosinophils 0      % Basophils 0      % Immature Granulocytes 0      NRBCs per 100 WBC 0      Absolute Neutrophils 13.3 (*)     Absolute Lymphocytes 1.3      Absolute Monocytes 1.2      Absolute Eosinophils 0.1      Absolute Basophils 0.0      Absolute Immature Granulocytes 0.1      Absolute NRBCs 0.0     ROUTINE UA WITH MICROSCOPIC REFLEX TO CULTURE - Abnormal    Color Urine Yellow      Appearance Urine Clear      Glucose Urine Negative      Bilirubin Urine Negative      Ketones Urine 10 (*)     Specific Gravity  Urine 1.024      Blood Urine Small (*)     pH Urine 6.0      Protein Albumin Urine 20 (*)     Urobilinogen Urine Normal      Nitrite Urine Negative      Leukocyte Esterase Urine Negative      Mucus Urine Present (*)     RBC Urine 5 (*)     WBC Urine 3      Squamous Epithelials Urine 1     LACTIC ACID WHOLE BLOOD - Normal    Lactic Acid 1.9     BLOOD CULTURE   BLOOD CULTURE     Emergency Department Course & Assessments:    Interventions:  Medications   piperacillin-tazobactam (ZOSYN) 3.375 g vial to attach to  mL bag (has no administration in time range)   sodium chloride 0.9% BOLUS 1,000 mL (0 mLs Intravenous Stopped 1/25/24 1919)   morphine (PF) injection 4 mg (4 mg Intravenous $Given 1/25/24 1646)   acetaminophen (TYLENOL) tablet 1,000 mg (1,000 mg Oral $Given 1/25/24 1637)   piperacillin-tazobactam (ZOSYN) 4.5 g vial to attach to  mL bag (0 g Intravenous Stopped 1/25/24 1805)   iopamidol (ISOVUE-370) solution 135 mL (135 mLs Intravenous $Given 1/25/24 1733)   sodium chloride 0.9 % bag 500 mL for CT scan flush use (79 mLs Intravenous $Given 1/25/24 1734)   sodium chloride 0.9% BOLUS 1,000 mL (0 mLs Intravenous Stopped 1/25/24 1919)   morphine (PF) injection 4 mg (4 mg Intravenous $Given 1/25/24 1921)   ketorolac (TORADOL) injection 15 mg (15 mg Intravenous $Given 1/25/24 2004)     Assessments:  1614 I obtained history and examined the patient as noted above.   1904 I rechecked the patient and explained findings.     Independent Interpretation (X-rays, CTs, rhythm strip):  None    Consultations/Discussion of Management or Tests:  ED Course as of 01/25/24 2050 Thu Jan 25, 2024 1931 I spoke with Cari, hospitalist, regarding the patient's presentation and plan of care.          Social Determinants of Health affecting care:   None    Disposition:  The patient was admitted to the hospital under the care of Dr. Gupta.     Impression & Plan    CMS Diagnoses: None    Medical Decision  Makin-year-old female presents emergency department with a complaint of worsening left lower quadrant pain.  Patient reports that she was diagnosed with diverticulitis on 2024.  She took 7 days of antibiotics and improved.  She states that now her pain is getting worse.  Started to get worse today with nausea.  No vomiting.  She does report a fever.  Denies any pain with urination.  She has not had any abdominal surgeries.  On exam patient has tenderness in the left lower quadrant.  She is also febrile and tachycardic.  I gave her 2 L normal saline.  I did give her a dose of Zosyn and Tylenol.  White blood cell count is elevated at 15.9, she has increased from 3 weeks ago.  Her lactic acid is normal.  Vital signs show that she is febrile and tachycardic.  On exam patient's abdomen is soft.  She does have tenderness to palpation in the left lower quadrant, she is not guarding, and there are no peritoneal signs.  She does not have an acute abdomen.  I do have concern for abscess, perforation, intra-abdominal catastrophe, urinary tract infection.  CT scan shows worsening diverticulitis with interval development of small fluid and gas-filled sinus tracts extending into the sigmoid mesenteric fat, one of which extends to the left ovary. No drainable abscess.   Patient is admitted to Dr. Gupta.      Diagnosis:    ICD-10-CM    1. Abdominal pain, left lower quadrant  R10.32       2. Diverticulitis  K57.92         Scribe Disclosure:  I, Yosi Nelson, am serving as a scribe at 5:56 PM on 2024 to document services personally performed by Kathie Nguyen MD, based on my observations and the provider's statements to me.   2024   Kathie Nguyen MD Richardson, Elizabeth, MD  24

## 2024-01-25 NOTE — ED TRIAGE NOTES
Recently dx with diverticulitis and colitis and finished antibiotics, abd pain worse again today

## 2024-01-25 NOTE — PHARMACY-ADMISSION MEDICATION HISTORY
Pharmacist Admission Medication History    Admission medication history is complete. The information provided in this note is only as accurate as the sources available at the time of the update.    Information Source(s): Patient, Hospital records, and CareEverywhere/SureScripts via in-person    Pertinent Information: None    Changes made to PTA medication list:  Added: None  Deleted: None  Changed: None      Medication History Completed By: Tesfaye Stearns RPH 1/25/2024 5:21 PM    PTA Med List   Medication Sig Last Dose    acetaminophen (TYLENOL) 325 MG tablet Take 325-650 mg by mouth every 4 hours as needed for mild pain 1/25/2024    amLODIPine-valsartan (EXFORGE) 5-160 MG tablet Take 1 tablet by mouth daily 1/25/2024 at AM    Etonogestrel (NEXPLANON SC)      hyoscyamine (LEVSIN) 0.125 MG tablet TAKE 1 TABLET(125 MCG) BY MOUTH EVERY 6 HOURS AS NEEDED FOR CRAMPING 1/25/2024    lactobacillus rhamnosus, GG, (CULTURELL) capsule Take 1 capsule by mouth daily 1/25/2024

## 2024-01-26 LAB
CRP SERPL-MCNC: 199.83 MG/L
ERYTHROCYTE [DISTWIDTH] IN BLOOD BY AUTOMATED COUNT: 13.5 % (ref 10–15)
HCG INTACT+B SERPL-ACNC: <1 MIU/ML
HCT VFR BLD AUTO: 28.1 % (ref 35–47)
HGB BLD-MCNC: 8.9 G/DL (ref 11.7–15.7)
LACTATE SERPL-SCNC: 0.5 MMOL/L (ref 0.7–2)
MCH RBC QN AUTO: 26.3 PG (ref 26.5–33)
MCHC RBC AUTO-ENTMCNC: 31.7 G/DL (ref 31.5–36.5)
MCV RBC AUTO: 83 FL (ref 78–100)
PLATELET # BLD AUTO: 353 10E3/UL (ref 150–450)
RBC # BLD AUTO: 3.38 10E6/UL (ref 3.8–5.2)
WBC # BLD AUTO: 12.5 10E3/UL (ref 4–11)

## 2024-01-26 PROCEDURE — 250N000011 HC RX IP 250 OP 636

## 2024-01-26 PROCEDURE — 84702 CHORIONIC GONADOTROPIN TEST: CPT | Performed by: HOSPITALIST

## 2024-01-26 PROCEDURE — 99232 SBSQ HOSP IP/OBS MODERATE 35: CPT | Performed by: INTERNAL MEDICINE

## 2024-01-26 PROCEDURE — 85027 COMPLETE CBC AUTOMATED: CPT

## 2024-01-26 PROCEDURE — 258N000003 HC RX IP 258 OP 636: Performed by: INTERNAL MEDICINE

## 2024-01-26 PROCEDURE — 258N000003 HC RX IP 258 OP 636

## 2024-01-26 PROCEDURE — 250N000011 HC RX IP 250 OP 636: Performed by: HOSPITALIST

## 2024-01-26 PROCEDURE — 36415 COLL VENOUS BLD VENIPUNCTURE: CPT

## 2024-01-26 PROCEDURE — 83605 ASSAY OF LACTIC ACID: CPT | Performed by: HOSPITALIST

## 2024-01-26 PROCEDURE — 36415 COLL VENOUS BLD VENIPUNCTURE: CPT | Performed by: HOSPITALIST

## 2024-01-26 PROCEDURE — 250N000013 HC RX MED GY IP 250 OP 250 PS 637

## 2024-01-26 PROCEDURE — 86140 C-REACTIVE PROTEIN: CPT | Performed by: INTERNAL MEDICINE

## 2024-01-26 PROCEDURE — 120N000001 HC R&B MED SURG/OB

## 2024-01-26 RX ORDER — PIPERACILLIN SODIUM, TAZOBACTAM SODIUM 3; .375 G/15ML; G/15ML
3.38 INJECTION, POWDER, LYOPHILIZED, FOR SOLUTION INTRAVENOUS EVERY 6 HOURS
Status: DISCONTINUED | OUTPATIENT
Start: 2024-01-26 | End: 2024-01-28

## 2024-01-26 RX ADMIN — ACETAMINOPHEN 650 MG: 325 TABLET, FILM COATED ORAL at 09:39

## 2024-01-26 RX ADMIN — MORPHINE SULFATE 1 MG: 2 INJECTION, SOLUTION INTRAMUSCULAR; INTRAVENOUS at 02:37

## 2024-01-26 RX ADMIN — SODIUM CHLORIDE: 9 INJECTION, SOLUTION INTRAVENOUS at 09:44

## 2024-01-26 RX ADMIN — PIPERACILLIN AND TAZOBACTAM 4.5 G: 4; .5 INJECTION, POWDER, FOR SOLUTION INTRAVENOUS at 00:11

## 2024-01-26 RX ADMIN — MORPHINE SULFATE 2 MG: 2 INJECTION, SOLUTION INTRAMUSCULAR; INTRAVENOUS at 12:48

## 2024-01-26 RX ADMIN — PIPERACILLIN AND TAZOBACTAM 3.38 G: 3; .375 INJECTION, POWDER, FOR SOLUTION INTRAVENOUS at 10:38

## 2024-01-26 RX ADMIN — PIPERACILLIN AND TAZOBACTAM 4.5 G: 4; .5 INJECTION, POWDER, FOR SOLUTION INTRAVENOUS at 05:34

## 2024-01-26 RX ADMIN — MORPHINE SULFATE 1 MG: 2 INJECTION, SOLUTION INTRAMUSCULAR; INTRAVENOUS at 21:03

## 2024-01-26 RX ADMIN — PIPERACILLIN AND TAZOBACTAM 3.38 G: 3; .375 INJECTION, POWDER, FOR SOLUTION INTRAVENOUS at 17:07

## 2024-01-26 RX ADMIN — SODIUM CHLORIDE: 9 INJECTION, SOLUTION INTRAVENOUS at 00:07

## 2024-01-26 RX ADMIN — SODIUM CHLORIDE: 9 INJECTION, SOLUTION INTRAVENOUS at 21:11

## 2024-01-26 RX ADMIN — ACETAMINOPHEN 650 MG: 325 TABLET, FILM COATED ORAL at 15:42

## 2024-01-26 RX ADMIN — MORPHINE SULFATE 1 MG: 2 INJECTION, SOLUTION INTRAMUSCULAR; INTRAVENOUS at 09:39

## 2024-01-26 RX ADMIN — ACETAMINOPHEN 650 MG: 325 TABLET, FILM COATED ORAL at 02:37

## 2024-01-26 RX ADMIN — PIPERACILLIN AND TAZOBACTAM 3.38 G: 3; .375 INJECTION, POWDER, FOR SOLUTION INTRAVENOUS at 23:51

## 2024-01-26 ASSESSMENT — ACTIVITIES OF DAILY LIVING (ADL)
ADLS_ACUITY_SCORE: 18

## 2024-01-26 NOTE — H&P
[facility]    History and Physical - Hospitalist Service       Date of Admission:  1/25/2024    Assessment & Plan      Misty Serrano is a 28 year old female admitted on 1/25/2024. She a past medical history of benign essential hypertension, recurrent diverticulitis, depression and anxiety who presents with 1 month left lower quadrant abdominal pain recently treated with OP Augmentin for diverticulitis.     Acute recurrent diverticulitis   Has previous seen Gastroenterologist  Dr. Jarrett but lost to follow up secondary to miscommunications.  Colonoscopy last done August 2021, biopsies taken. Patient has been battling LLQ pain since December 15th. Patient feels challenges with insurance and miscommunications lead to delay in seeking care. Seen in ED 1/4/24 and given a course of Augmentin which she finished 1/12/24. LLQ pain has not resolved.   -Admit to inpatient  -CT abdomen pelvis shows interval worsening sigmoid colon diverticulitis with interval development of small fluid and gas-filed sinus tracts extending into sigmoid mesenteric fat, one which extend to the left ovary.  -UA not suspicious of infection   -LA 1.9  -Monitor fever curve and vs q 4hrs    -Zosyn 4.5g IV q 6 hrs   -x2 peripheral blood cultures; pending   -Analgesia:prn acetaminophen or morphine 1-2 mg q2 hrs prn   -clear liquid diet and NPO at midnight   - mL/hr while NPO  -Colorectal surgery consultation given recurrence and development of small fluid and gas filled sinus tracts     Essential hypertension   [PTA: amlodipine-valsartan 5-160 mg daily]  Normotensive on admission. In light of fever and infection will hold PTA antihypertensive.   -Can reassess daily regarding appropriateness of resuming     Depression & anxiety   Stable with regular counseling; has declined medications in the past         Diet:  NPO  DVT Prophylaxis: Pneumatic Compression Devices  Clinton Catheter: Not present  Lines: None     Cardiac  "Monitoring: None  Code Status:  FULL     Clinically Significant Risk Factors Present on Admission                  # Hypertension: Noted on problem list      # Obesity: Estimated body mass index is 39.58 kg/m  as calculated from the following:    Height as of 1/4/24: 1.753 m (5' 9\").    Weight as of 1/4/24: 121.6 kg (268 lb).              Disposition Plan      Expected Discharge Date: 01/27/2024                The patient's care was discussed with the Attending Physician, Dr. Gupta .    Carolyn Alcala NP  Hospitalist Service  [facility]  Securely message with [employer] (more info)  Text page via [facility] Paging/Directory     ______________________________________________________________________    Chief Complaint   LLQ abdominal pain     History is obtained from the patient    History of Present Illness   Misty Serrano is a 28 year old female admitted on 1/25/2024. She a past medical history of benign essential hypertension, recurrent diverticulitis, depression and anxiety who presents with 1 month left lower quadrant abdominal pain recently treated with OP Augmentin for diverticulitis. Has previously seen Gastroenterologist  Dr. Jarrett but lost to follow up secondary to miscommunications.  Colonoscopy last done August 2021, biopsies taken. Patient has been battling LLQ pain since December 15th. Patient feels challenges with insurance and miscommunications lead to delay in seeking care. Seen in ED 1/4/24 and given a course of Augmentin which she finished 1/12/24. Sharp LLQ pain has not resolved. No nausea or vomiting. Denies dizziness. No dysuria. 6-8 bowel movements per day. Recent menses so difficult to tell true hematochezia.       Past Medical History    Past Medical History:   Diagnosis Date    Benign essential hypertension     FRANCES (generalized anxiety disorder)     History of diverticulitis     recurrent    Impaired fasting glucose     Morbid obesity (H)     Persistent depressive " disorder        Past Surgical History   Past Surgical History:   Procedure Laterality Date    COLONOSCOPY N/A 8/13/2021    Procedure: Colonoscopy, With Polypectomy And Biopsy;  Surgeon: Tejal Jarrett DO;  Location: [facility] OR    COLONOSCOPY WITH CO2 INSUFFLATION N/A 8/13/2021    Procedure: COLONOSCOPY, WITH CO2 INSUFFLATION;  Surgeon: Tejal Jarrett DO;  Location: [facility] OR    NO HISTORY OF SURGERY         Prior to Admission Medications   Prior to Admission Medications   Prescriptions Last Dose Informant Patient Reported? Taking?   Etonogestrel (NEXPLANON SC)  Self Yes Yes   acetaminophen (TYLENOL) 325 MG tablet 1/25/2024  Yes Yes   Sig: Take 325-650 mg by mouth every 4 hours as needed for mild pain   amLODIPine-valsartan (EXFORGE) 5-160 MG tablet 1/25/2024 at AM  No Yes   Sig: Take 1 tablet by mouth daily   hyoscyamine (LEVSIN) 0.125 MG tablet 1/25/2024  No Yes   Sig: TAKE 1 TABLET(125 MCG) BY MOUTH EVERY 6 HOURS AS NEEDED FOR CRAMPING   lactobacillus rhamnosus, GG, (CULTURELL) capsule 1/25/2024  Yes Yes   Sig: Take 1 capsule by mouth daily      Facility-Administered Medications: None        Review of Systems    The 10 point Review of Systems is negative other than noted in the HPI or here.     Social History   I have reviewed this patient's social history and updated it with pertinent information if needed.  Social History     Tobacco Use    Smoking status: Never    Smokeless tobacco: Never   Vaping Use    Vaping Use: Never used   Substance Use Topics    Alcohol use: Not Currently    Drug use: Never         Allergies   No Known Allergies     Physical Exam   Vital Signs: Temp: (!) 101.6  F (38.7  C) Temp src: Oral BP: 122/79 Pulse: 108   Resp: 20 SpO2: 97 % O2 Device: None (Room air)    Weight: 0 lbs 0 oz    Physical Exam  Constitutional:       General: She is not in acute distress.     Appearance: She is obese. She is not toxic-appearing.   HENT:      Head: Normocephalic.      Mouth/Throat:      Mouth: Mucous  membranes are moist.   Eyes:      Pupils: Pupils are equal, round, and reactive to light.   Cardiovascular:      Rate and Rhythm: Regular rhythm. Tachycardia present.      Pulses: Normal pulses.      Heart sounds: No murmur heard.  Pulmonary:      Effort: Pulmonary effort is normal.      Breath sounds: Normal breath sounds.   Abdominal:      General: Abdomen is protuberant.      Tenderness: There is abdominal tenderness in the left lower quadrant. There is no guarding or rebound.   Musculoskeletal:         General: Normal range of motion.   Skin:     General: Skin is warm and dry.      Capillary Refill: Capillary refill takes less than 2 seconds.   Neurological:      Mental Status: She is alert and oriented to person, place, and time.   Psychiatric:         Attention and Perception: Attention normal.         Mood and Affect: Mood normal.         Speech: Speech normal.           Medical Decision Making       90 MINUTES SPENT BY ME on the date of service doing chart review, history, exam, documentation & further activities per the note.      Data     I have personally reviewed the following data over the past 24 hrs:    15.9 (H)  \   10.7 (L)   / 431     140 101 4.1 (L) /  101 (H)   4.1 28 0.59 \     ALT: 34 AST: 18 AP: 78 TBILI: 0.5   ALB: 3.9 TOT PROTEIN: 7.9 LIPASE: N/A     Procal: N/A CRP: N/A Lactic Acid: 1.9         Imaging results reviewed over the past 24 hrs:   Recent Results (from the past 24 hour(s))   Abd/pelvis CT,  IV  contrast only TRAUMA / AAA    Narrative    EXAM: CT ABDOMEN PELVIS W CONTRAST  LOCATION: [facility]  DATE: 1/25/2024    INDICATION: recent divertiultis dx, now worse pain, sepsis  COMPARISON: 01/04/2024  TECHNIQUE: CT scan of the abdomen and pelvis was performed following injection of IV contrast. Multiplanar reformats were obtained. Dose reduction techniques were used.  CONTRAST: 135mL Isovue 370    FINDINGS:   LOWER CHEST: Normal.    HEPATOBILIARY:  Normal.    PANCREAS: Normal.    SPLEEN: Normal.    ADRENAL GLANDS: Normal.    KIDNEYS/BLADDER: Normal.    BOWEL: Worsening of severe proximal sigmoid diverticulitis. There is extensive adjacent fat stranding. There are new small fluid and gas-containing tracts within the sigmoid mesenteric fat, one of which extends towards the left ovary. No drainable   abscess. No gross intraperitoneal free air.    LYMPH NODES: Normal.    VASCULATURE: Unremarkable.    PELVIC ORGANS: Normal.    MUSCULOSKELETAL: Normal.      Impression    IMPRESSION:   1.  Interval worsening of proximal sigmoid colon diverticulitis with interval development of small fluid and gas-filled sinus tracts extending into the sigmoid mesenteric fat, one of which extends to the left ovary. No drainable abscess.   outpatient

## 2024-01-26 NOTE — PROGRESS NOTES
RECEIVING UNIT ED HANDOFF REVIEW    ED Nurse Handoff Report was reviewed by: Nneka Jordan RN on January 25, 2024 at 9:06 PM

## 2024-01-26 NOTE — PLAN OF CARE
Goal Outcome Evaluation:     Summary: diverticulitis, abdominal pain     DATE & TIME: 1/25/24 2146-2300    Cognitive Concerns/ Orientation : A& O x 4, pleasant   BEHAVIOR & AGGRESSION TOOL COLOR: green  CIWA SCORE: NA   ABNL VS/O2: temp 99.7  MOBILITY: independent  PAIN MANAGMENT: mild L abdominal pain  DIET: clear liq, NPO at midnight  BOWEL/BLADDER: continent  ABNL LAB/BG: BC pending, WBC 15.9  DRAIN/DEVICES: 2 right PIV SL, intermittent IV abx.  TELEMETRY RHYTHM: NA  SKIN: WDL  TESTS/PROCEDURES: none  D/C DAY/GOALS/PLACE: pending improvements  OTHER IMPORTANT INFO: n/a    Admission    Patient arrives to room 619 via cart from ED.  Care plan note: completed    Inpatient nursing criteria listed below were met:    Did you put disposition on whiteboard and in sticky note: No  Full skin assessment done (add LDA if skin issue present). Initials of 2nd RN: Yes, O.O.  Isolation education started/completed NA  Patient allergies verified with patient: Yes  Fall Risk? (Care plan updated, Education given and documented) NA  Primary Care Plan initiated: Yes  Home medications documented in belongings flowsheet: NA  Patient belongings documented in belongings flowsheet: Yes  Reminder note (belongings/ medications) placed in discharge instructions:Yes  Admission profile/ required documentation complete: Yes  If patient is a 72 hour hold/Commitment are belongings removed from room and locked up? NA

## 2024-01-26 NOTE — PLAN OF CARE
Goal Outcome Evaluation:       Shift:01/25-01/26   [id number]    Summary: diverticulitis, abdominal pain    Cognitive Concerns/ Orientation : A& O x 4, pleasant   BEHAVIOR & AGGRESSION TOOL COLOR: green   ABNL VS/O2: VSS RA  MOBILITY: ind  PAIN MANAGMENT: PRN tylenol & morphine x 1 given  DIET: NPO   BOWEL/BLADDER: cont B/B. BM x2 this shift.  ABNL LAB/BG: see chart  DRAIN/DEVICES: 2 right PIV. R arm dorsal infusing NS @100ml/hr. intermittent IV abx.  SKIN: WDL  TESTS/PROCEDURES: none  D/C DAY/GOALS/PLACE: pending improvements  OTHER IMPORTANT INFO: Colorectal surgery consult.BC result pending.

## 2024-01-26 NOTE — PLAN OF CARE
Shift: 01/26 0700-1930    Summary: diverticulitis, abdominal pain    Cognitive Concerns/ Orientation : A& O x 4, pleasant   BEHAVIOR & AGGRESSION TOOL COLOR: green   ABNL VS/O2: VSS RA, tmax 102.7, tylenol given  MOBILITY: ind  PAIN MANAGMENT: PRN tylenol & morphine x 2 given, effective  DIET: Clears - tolerating well  BOWEL/BLADDER: cont B/B. BM x4 this shift.  ABNL LAB/BG: see chart  DRAIN/DEVICES: 2 right PIV. R arm infusing NS @50 ml/hr. intermittent IV abx.  SKIN: WDL  TESTS/PROCEDURES: none  D/C DAY/GOALS/PLACE: pending improvements  OTHER IMPORTANT INFO: Colorectal surgery consulted. BC result pending.

## 2024-01-26 NOTE — CONSULTS
[facility]  Colon and Rectal Surgery Consult Note  Name: Misty Serrano    MRN: [id number]  YOB: 1995    Age: 28 year old  Date of admission: 1/25/2024  Primary care provider: No Ref-Primary, Physician     Requesting Physician:  Carolyn Alcala NP  Reason for consult:  diverticulitis            History of Present Illness:   Misty Serrano is a 28 year old female, seen at the request of Ms. Alcala, who presents with worsening abdominal pain.  She has had left-sided abdominal pain since mid December.  She was evaluated in the ER on 1/4, and was discharged home with a course of Augmentin for management of uncomplicated diverticulitis.  Despite antibiotics, her symptoms have not improved so she presented to the ER for evaluation.  On presentation she was noted to be febrile to 101.6, with mild tachycardia and normal blood pressure.  She had a leukocytosis to 16, and CT scan of the abdomen and pelvis demonstrated ongoing inflammation of the proximal sigmoid colon with a fistulous tract that extends towards the left ovary.  No evidence of drainable abscess.  She reports improvement in her abdominal pain since admission and initiation of IV antibiotics.  She underwent a colonoscopy in 2021 due to recurrent episodes of diverticulitis, was found to have diverticulosis with a mildly inflamed area at the sigmoid colon without evidence of a luminal mass.                Past Medical History:     Past Medical History:   Diagnosis Date    Benign essential hypertension     FRANCES (generalized anxiety disorder)     History of diverticulitis     recurrent    Impaired fasting glucose     Morbid obesity (H)     Persistent depressive disorder              Past Surgical History:     Past Surgical History:   Procedure Laterality Date    COLONOSCOPY N/A 8/13/2021    Procedure: Colonoscopy, With Polypectomy And Biopsy;  Surgeon: Tejal Jarrett DO;  Location: [facility] OR    COLONOSCOPY WITH CO2 INSUFFLATION  N/A 8/13/2021    Procedure: COLONOSCOPY, WITH CO2 INSUFFLATION;  Surgeon: Tejal Jarrett DO;  Location: [facility] OR    NO HISTORY OF SURGERY                 Social History:     Social History     Tobacco Use    Smoking status: Never    Smokeless tobacco: Never   Substance Use Topics    Alcohol use: Not Currently             Family History:     Family History   Problem Relation Age of Onset    Hypertension Mother     Rheumatoid Arthritis Mother     Hypertension Father     Diabetes Type 2  Maternal Grandfather     Coronary Artery Disease Maternal Grandfather         s/p CABG later in life    Prostate Cancer Maternal Grandfather     Skin Cancer Maternal Grandfather     Diabetes Type 2  Paternal Grandfather     Coronary Artery Disease Paternal Grandfather         s/p CABG later in life    Myocardial Infarction No family hx of     Cerebrovascular Disease No family hx of     Coronary Artery Disease Early Onset No family hx of     Breast Cancer No family hx of     Colon Cancer No family hx of     Ovarian Cancer No family hx of              Allergies:   No Known Allergies          Medications:      [Held by provider] amlodipine-valsartan (EXFORGE) 5-160 mg combo dose   Oral Daily    piperacillin-tazobactam  3.375 g Intravenous Q6H    sodium chloride (PF)  3 mL Intracatheter Q8H             Review of Systems:   A comprehensive greater than 10 system review of systems was carried out.  Pertinent positives and negatives are noted above.  Otherwise negative for contributory info.            Physical Exam:     Blood pressure 125/83, pulse 72, temperature 98.9  F (37.2  C), temperature source Oral, resp. rate 14, SpO2 99%, not currently breastfeeding.    Intake/Output Summary (Last 24 hours) at 1/26/2024 1353  Last data filed at 1/26/2024 1040  Gross per 24 hour   Intake 340 ml   Output --   Net 340 ml     Exam:  General - Awake alert and oriented, appears stated age  Pulm - Non-labored breathing with normal respiratory effort  CVS  - reg rate and rhythm, no peripheral edema  Abd -soft, focal tenderness to palpation in the left lower quadrant.  No guarding, rigidity or peritoneal signs.   Neuro - CN II-XII grossly intact  Musculoskeletal - extremities with no clubbing, cyanosis or edema; able to ambulate  Psych - responsive, alert, cooperative; oriented x3; appropriate mood and affect  External/skin - inspection reveals no rashes, lesions or ulcers, normal coloring             Data Reviewed:     Recent Results (from the past 24 hour(s))   Abd/pelvis CT,  IV  contrast only TRAUMA / AAA    Narrative    EXAM: CT ABDOMEN PELVIS W CONTRAST  LOCATION: [facility]  DATE: 1/25/2024    INDICATION: recent divertiultis dx, now worse pain, sepsis  COMPARISON: 01/04/2024  TECHNIQUE: CT scan of the abdomen and pelvis was performed following injection of IV contrast. Multiplanar reformats were obtained. Dose reduction techniques were used.  CONTRAST: 135mL Isovue 370    FINDINGS:   LOWER CHEST: Normal.    HEPATOBILIARY: Normal.    PANCREAS: Normal.    SPLEEN: Normal.    ADRENAL GLANDS: Normal.    KIDNEYS/BLADDER: Normal.    BOWEL: Worsening of severe proximal sigmoid diverticulitis. There is extensive adjacent fat stranding. There are new small fluid and gas-containing tracts within the sigmoid mesenteric fat, one of which extends towards the left ovary. No drainable   abscess. No gross intraperitoneal free air.    LYMPH NODES: Normal.    VASCULATURE: Unremarkable.    PELVIC ORGANS: Normal.    MUSCULOSKELETAL: Normal.      Impression    IMPRESSION:   1.  Interval worsening of proximal sigmoid colon diverticulitis with interval development of small fluid and gas-filled sinus tracts extending into the sigmoid mesenteric fat, one of which extends to the left ovary. No drainable abscess.       Recent Labs   Lab 01/26/24  0647 01/25/24  1606   WBC 12.5* 15.9*   HGB 8.9* 10.7*   HCT 28.1* 34.8*   MCV 83 85    431     Recent Labs    Lab 01/25/24  1606      POTASSIUM 4.1   CHLORIDE 101   CO2 28   ANIONGAP 11   *   BUN 4.1*   CR 0.59   GFRESTIMATED >90   JOSÉ 9.6   PROTTOTAL 7.9   ALBUMIN 3.9   BILITOTAL 0.5   ALKPHOS 78   AST 18   ALT 34         Assessment and Plan:   Misty Serrano is a 28 year old female who presented with recurrent, acute diverticulitis with possible fistulous connection to the left ovary.      No indication for acute surgical intervention at this time.  We discussed the typical etiology and management of diverticular disease in detail today.  We discussed that as her tachycardia has resolved, and her abdominal exam is reassuring, I do not think she requires urgent surgical intervention at this time.  If her symptoms do continue to improve with IV antibiotic management, will plan for a repeat CT scan of the outpatient in approximately 1 to 2 weeks.  If she has increasing pain, leukocytosis or fevers, we will repeat a CT scan while inpatient to assess for drainable fluid collection.  If her symptoms significantly worsened, we discussed that she may require surgical intervention during this admission which would entail an open sigmoid colectomy and creation of a temporary colostomy bag.  Given the possible fistulous connection to the left ovary, as well as the recurrent nature of her diverticular disease, we discussed that she would be a good candidate for elective surgical resection in approximately 6 to 8 weeks.  We discussed that in the elective setting, surgery can typically be performed in a minimally invasive fashion, and usually does not require a temporary ostomy bag.  She is okay for a clear liquid diet, and would recommend continuing antibiotics for at least 48 hours and with symptom improvement can transition to oral antibiotics with plans for a 2-week course.    Additional history obtained from chart.  Time spent on consultation: 60 minutes, greater than 50% spent on counseling and/or coordination of  care.      Namrata Evangelista MD  Colorectal Surgery    [facility]  [address]  [place] [address]  T: [phone]  F: [phone]

## 2024-01-26 NOTE — ED NOTES
[facility]  ED Nurse Handoff Report    ED Chief complaint: Abdominal Pain      ED Diagnosis: Diverticulitis   Final diagnoses:   Abdominal pain, left lower quadrant   Diverticulitis   Acute sepsis (H)       Code Status: MD to address     Allergies: No Known Allergies    Patient Story: Recently dx with diverticulitis and colitis and finished antibiotics, abd pain worse again today   Focused Assessment:  LLQ abdominal discomfort     Treatments and/or interventions provided: Labs,imaging,fluids, pain management   Patient's response to treatments and/or interventions: In progress   Does this patient have any cognitive concerns?:  N/A    Activity level - Baseline/Home:  Independent  Activity Level - Current:   Independent    Patient's Preferred language: English   Needed?: No    Isolation: None  Infection: Not Applicable  Patient tested for COVID 19 prior to admission: NO  Bariatric?: No    Vital Signs:   Vitals:    01/25/24 1803 01/25/24 1811 01/25/24 1907 01/25/24 1921   BP: 116/76   122/79   Pulse: 64   108   Resp:    20   Temp:    (!) 101.6  F (38.7  C)   TempSrc:    Oral   SpO2: 92% 96% 97% 97%       Cardiac Rhythm:     Was the PSS-3 completed:   Yes  WOBS brochure/video discussed/provided to patient/family: N/A                For the majority of the shift this patient's behavior was Green.       ED NURSE PHONE NUMBER: [phone]

## 2024-01-26 NOTE — UTILIZATION REVIEW
Admission Status; Secondary Review Determination       Under the authority of the Utilization Management Committee, the utilization review process indicated a secondary review on the above patient. The review outcome is based on review of the medical records, discussions with staff, and applying clinical experience noted on the date of the review.     (x) Inpatient Status Appropriate - This patient's medical care is consistent with medical management for inpatient care and reasonable inpatient medical practice.     RATIONALE FOR DETERMINATION     Misty Serrano is a 28 year old female admitted on 1/25/2024. She has history of benign essential hypertension, recurrent diverticulitis, depression, and anxiety. She presented with one month of left lower quadrant abdominal pain after recently being treated as an outpatient for diverticulitis with Augmentin.  Emergency department evaluation showed temperature 101.6, heart rate in the 100s, and white blood cell count of 15.9.  CT of the abdomen and pelvis showed interval worsening of sigmoid colon diverticulitis with interval development of small fluid and gas-filed sinus tracts extending into sigmoid mesenteric fat, one which extended to the left ovary. Suki has been admitted to the hospital and treated with Zosyn for worsening diverticulitis with sepsis (fever, tachycardia, and leukocytosis).  Inpatient admission is appropriate for treatment of worsening diverticulitis (by CT scan) with sepsis.  Treatment with IV antibiotics and colorectal surgery evaluation in the inpatient setting are appropriate.    At the time of admission with the information available to the attending physician more than 2 nights Hospital complex care was anticipated, based on patient risk of adverse outcome if treated as outpatient and complex care required. Inpatient admission is appropriate based on the Medicare guidelines.     This document was produced using voice recognition software        The information on this document is developed by the utilization review team in order for the business office to ensure compliance. This only denotes the appropriateness of proper admission status and does not reflect the quality of care rendered.   The definitions of Inpatient Status and Observation Status used in making the determination above are those provided in the CMS Coverage Manual, Chapter 1 and Chapter 6, section 70.4.   Sincerely,     Osmar Da Silva MD    Utilization Review  Physician Advisor  [facility].

## 2024-01-26 NOTE — PROGRESS NOTES
Summary: diverticulitis, abdominal pain     DATE & TIME: 1/25/24 2146-2300    Cognitive Concerns/ Orientation : A& O x 4, pleasant   BEHAVIOR & AGGRESSION TOOL COLOR: green  CIWA SCORE: NA   ABNL VS/O2: temp 99.7  MOBILITY: independent  PAIN MANAGMENT: mild L abdominal pain - declined PRN tylenol.  DIET: clear liq, NPO at midnight  BOWEL/BLADDER: continent  ABNL LAB/BG: BC pending, WBC 15.9  DRAIN/DEVICES: 2 right PIV SL, intermittent IV abx.  TELEMETRY RHYTHM: NA  SKIN: WDL  TESTS/PROCEDURES: none  D/C DAY/GOALS/PLACE: pending improvements  OTHER IMPORTANT INFO: n/a    Admission    Patient arrives to room 619 via cart from ED at 1915.  Care plan note: completed    Inpatient nursing criteria listed below were met:    Did you put disposition on whiteboard and in sticky note: No  Full skin assessment done (add LDA if skin issue present). Initials of 2nd RN: Yes, O.O.  Isolation education started/completed NA  Patient allergies verified with patient: Yes  Fall Risk? (Care plan updated, Education given and documented) NA  Primary Care Plan initiated: Yes  Home medications documented in belongings flowsheet: NA  Patient belongings documented in belongings flowsheet: Yes  Reminder note (belongings/ medications) placed in discharge instructions:Yes  Admission profile/ required documentation complete: Yes  If patient is a 72 hour hold/Commitment are belongings removed from room and locked up? NA

## 2024-01-26 NOTE — PROGRESS NOTES
[facility]    Medicine Progress Note - Hospitalist Service    Date of Admission:  1/25/2024    Assessment & Plan   Misty Serrano is a 28 year old female admitted on 1/25/2024. She a past medical history of benign essential hypertension, recurrent diverticulitis, depression and anxiety who presents with 1 month left lower quadrant abdominal pain recently treated with OP Augmentin for diverticulitis.   Presented back with more ABD complaints and imaging concerning for worsening diverticulitis.    Acute diverticulitis with progression despite recent treatment:  -  Zosyn IV  -  Pain control  -  Clear liquid diet  -  CRS consult appreciated, plan for clear liquids and inpatient treatment for now.  If worsening will need more urgent surgery.  If improves, likely can discharge in a couple days and then be on a prolonged abx course with close CRS follow-up.  She may still need surgery, but she would be lower risk for issues and be possibly able to avoid an ostomy if she can have surgery in 6-8 weeks electively.    Essential hypertension:  [PTA: amlodipine-valsartan 5-160 mg daily]  Normotensive on admission. In light of fever and infection will hold PTA antihypertensive.   -Can reassess daily regarding appropriateness of resuming      Depression & anxiety:  Stable with regular counseling; has declined medications in the past       Medical Decision Making       35 MINUTES SPENT BY ME on the date of service doing chart review, history, exam, documentation & further activities per the note.      Labs/Imaging Reviewed:  See Information above and Data section below    PPE Used:  Mask       Diet: Clear Liquid Diet    DVT Prophylaxis: Pneumatic Compression Devices and Ambulate every shift  Clinton Catheter: Not present  Lines: None     Cardiac Monitoring: None  Code Status: Full Code      Clinically Significant Risk Factors Present on Admission                  # Hypertension: Noted on problem list     "  # Obesity: Estimated body mass index is 39.58 kg/m  as calculated from the following:    Height as of 1/4/24: 1.753 m (5' 9\").    Weight as of 1/4/24: 121.6 kg (268 lb).              Disposition Plan      Expected Discharge Date: 01/29/2024                  Onesimo Acosta DO  Hospitalist Service  [facility]  Securely message with [employer] (more info)  Text page via [email or web address] Paging/Directory   ______________________________________________________________________    Interval History   Assumed care, history reviewed.  Patient reports pain improved though still uncomfortable.    Physical Exam   Vital Signs: Temp: 98.7  F (37.1  C) Temp src: Oral BP: 131/88 Pulse: 112   Resp: 16 SpO2: 97 % O2 Device: None (Room air)    Weight: 0 lbs 0 oz    GEN:  Alert, oriented x 3, appears ill but comfortable, no overt distress.  HEENT:  Normocephalic/atraumatic, no scleral icterus, no nasal discharge, mouth moist.  CV:  Regular rate and rhythm, borderline tachy.  No loud murmur.  LUNGS:  Clear to auscultation bilaterally without rales/rhonchi/wheezing/retractions.  Symmetric chest rise on inhalation noted.  ABD:  Active bowel sounds, soft, tender mostly left side.  No guarding/rigidity.  EXT:  No edema.  No cyanosis.  No acute joint synovitis noted.  SKIN:  Dry to touch, no exanthems noted in the visualized areas.    Medications    sodium chloride 100 mL/hr at 01/26/24 0944      [Held by provider] amlodipine-valsartan (EXFORGE) 5-160 mg combo dose   Oral Daily    piperacillin-tazobactam  3.375 g Intravenous Q6H    sodium chloride (PF)  3 mL Intracatheter Q8H       Data     Labs and Imaging results below reviewed today.    I have personally reviewed the following data over the past 24 hrs:    12.5 (H)  \   8.9 (L)   / 353     N/A N/A N/A /  N/A   N/A N/A N/A \     Procal: N/A CRP: N/A Lactic Acid: 0.5 (L)             No results found for this or any previous visit (from the past 24 hour(s)).      "

## 2024-01-27 LAB
ANION GAP SERPL CALCULATED.3IONS-SCNC: 11 MMOL/L (ref 7–15)
BUN SERPL-MCNC: 3.3 MG/DL (ref 6–20)
CALCIUM SERPL-MCNC: 9.1 MG/DL (ref 8.6–10)
CHLORIDE SERPL-SCNC: 100 MMOL/L (ref 98–107)
CREAT SERPL-MCNC: 0.51 MG/DL (ref 0.51–0.95)
CRP SERPL-MCNC: 243.26 MG/L
DEPRECATED HCO3 PLAS-SCNC: 26 MMOL/L (ref 22–29)
EGFRCR SERPLBLD CKD-EPI 2021: >90 ML/MIN/1.73M2
ERYTHROCYTE [DISTWIDTH] IN BLOOD BY AUTOMATED COUNT: 13.4 % (ref 10–15)
GLUCOSE SERPL-MCNC: 105 MG/DL (ref 70–99)
HCT VFR BLD AUTO: 30.7 % (ref 35–47)
HGB BLD-MCNC: 9.6 G/DL (ref 11.7–15.7)
LACTATE SERPL-SCNC: 0.8 MMOL/L (ref 0.7–2)
MCH RBC QN AUTO: 26.4 PG (ref 26.5–33)
MCHC RBC AUTO-ENTMCNC: 31.3 G/DL (ref 31.5–36.5)
MCV RBC AUTO: 84 FL (ref 78–100)
PLATELET # BLD AUTO: 403 10E3/UL (ref 150–450)
POTASSIUM SERPL-SCNC: 3.4 MMOL/L (ref 3.4–5.3)
RBC # BLD AUTO: 3.64 10E6/UL (ref 3.8–5.2)
SODIUM SERPL-SCNC: 137 MMOL/L (ref 135–145)
WBC # BLD AUTO: 13.8 10E3/UL (ref 4–11)

## 2024-01-27 PROCEDURE — 250N000011 HC RX IP 250 OP 636: Performed by: HOSPITALIST

## 2024-01-27 PROCEDURE — 99232 SBSQ HOSP IP/OBS MODERATE 35: CPT | Performed by: INTERNAL MEDICINE

## 2024-01-27 PROCEDURE — 83605 ASSAY OF LACTIC ACID: CPT | Performed by: INTERNAL MEDICINE

## 2024-01-27 PROCEDURE — 250N000011 HC RX IP 250 OP 636

## 2024-01-27 PROCEDURE — 120N000001 HC R&B MED SURG/OB

## 2024-01-27 PROCEDURE — 258N000003 HC RX IP 258 OP 636: Performed by: INTERNAL MEDICINE

## 2024-01-27 PROCEDURE — 250N000013 HC RX MED GY IP 250 OP 250 PS 637

## 2024-01-27 PROCEDURE — 36415 COLL VENOUS BLD VENIPUNCTURE: CPT | Performed by: INTERNAL MEDICINE

## 2024-01-27 PROCEDURE — 80048 BASIC METABOLIC PNL TOTAL CA: CPT | Performed by: INTERNAL MEDICINE

## 2024-01-27 PROCEDURE — 86140 C-REACTIVE PROTEIN: CPT | Performed by: INTERNAL MEDICINE

## 2024-01-27 PROCEDURE — 250N000013 HC RX MED GY IP 250 OP 250 PS 637: Performed by: INTERNAL MEDICINE

## 2024-01-27 PROCEDURE — 85027 COMPLETE CBC AUTOMATED: CPT | Performed by: INTERNAL MEDICINE

## 2024-01-27 RX ORDER — AMLODIPINE BESYLATE 2.5 MG/1
2.5 TABLET ORAL DAILY
Status: DISCONTINUED | OUTPATIENT
Start: 2024-01-27 | End: 2024-01-28

## 2024-01-27 RX ADMIN — SODIUM CHLORIDE: 9 INJECTION, SOLUTION INTRAVENOUS at 21:01

## 2024-01-27 RX ADMIN — MORPHINE SULFATE 2 MG: 2 INJECTION, SOLUTION INTRAMUSCULAR; INTRAVENOUS at 05:28

## 2024-01-27 RX ADMIN — PIPERACILLIN AND TAZOBACTAM 3.38 G: 3; .375 INJECTION, POWDER, FOR SOLUTION INTRAVENOUS at 10:31

## 2024-01-27 RX ADMIN — ACETAMINOPHEN 650 MG: 325 TABLET, FILM COATED ORAL at 11:39

## 2024-01-27 RX ADMIN — ACETAMINOPHEN 650 MG: 325 TABLET, FILM COATED ORAL at 19:10

## 2024-01-27 RX ADMIN — AMLODIPINE BESYLATE 2.5 MG: 2.5 TABLET ORAL at 17:11

## 2024-01-27 RX ADMIN — PIPERACILLIN AND TAZOBACTAM 3.38 G: 3; .375 INJECTION, POWDER, FOR SOLUTION INTRAVENOUS at 04:35

## 2024-01-27 RX ADMIN — PIPERACILLIN AND TAZOBACTAM 3.38 G: 3; .375 INJECTION, POWDER, FOR SOLUTION INTRAVENOUS at 22:36

## 2024-01-27 RX ADMIN — ACETAMINOPHEN 650 MG: 325 TABLET, FILM COATED ORAL at 06:28

## 2024-01-27 RX ADMIN — PIPERACILLIN AND TAZOBACTAM 3.38 G: 3; .375 INJECTION, POWDER, FOR SOLUTION INTRAVENOUS at 17:10

## 2024-01-27 ASSESSMENT — ACTIVITIES OF DAILY LIVING (ADL)
ADLS_ACUITY_SCORE: 19
ADLS_ACUITY_SCORE: 18
ADLS_ACUITY_SCORE: 18
ADLS_ACUITY_SCORE: 19
ADLS_ACUITY_SCORE: 18
ADLS_ACUITY_SCORE: 19
ADLS_ACUITY_SCORE: 18

## 2024-01-27 NOTE — PROGRESS NOTES
Colorectal Surgery Progress Note      Interval History:  Patient having multiple bowel movements.  Pain is improving.    Physical Exam:   Temp:  [98.2  F (36.8  C)-102.7  F (39.3  C)] 98.7  F (37.1  C)  Pulse:  [104-114] 104  Resp:  [16-18] 18  BP: (129-141)/(79-90) 129/87  SpO2:  [97 %-100 %] 97 %  General: Alert, oriented, appears comfortable, NAD.  Respiratory: breathing non labored  Abdomen: Abdomen is soft, no tender, no distended.   Data:   All laboratory and imaging data in the past 24 hours reviewed  I/O last 3 completed shifts:  In: 340 [P.O.:340]  Out: -   Recent Labs   Lab Test 01/27/24  1039 01/26/24  0647 01/25/24  1606   WBC 13.8* 12.5* 15.9*   HGB 9.6* 8.9* 10.7*    353 431      Recent Labs   Lab Test 01/27/24  1039 01/25/24  1606 01/04/24 2006    140 136   POTASSIUM 3.4 4.1 3.9   CHLORIDE 100 101 99   CO2 26 28 28   BUN 3.3* 4.1* 4.9*   CR 0.51 0.59 0.54   ANIONGAP 11 11 9   JOSÉ 9.1 9.6 9.6   * 101* 86        Recent Labs   Lab Test 01/25/24  1606   PROTTOTAL 7.9   ALBUMIN 3.9   BILITOTAL 0.5   ALKPHOS 78   AST 18   ALT 34       Assessment and Plan:     Suki is a 28-year-old with recurrent diverticulitis.  She is presenting with abdominal pain and a white count of 15 that is now downtrending to 13.  She has return of bowel function.  Her pain control is okay.  She is currently on IV antibiotics.    Plan  Okay to advance to full liquid diet  Continue IV antibiotics  Trend white count  Will continue to follow  Please page if abdominal exam or clinical status changes    Pt d/w Dr Burgess Richelle Florez MD  Fellow, Colon and Rectal Surgery  [facility]  Pager: [phone]      Richelle Florez MD  Fellow, Colon and Rectal Surgery  [facility]  Pager: [phone]     1

## 2024-01-27 NOTE — PLAN OF CARE
Goal Outcome Evaluation:      Plan of Care Reviewed With: patient    Overall Patient Progress: no changeOverall Patient Progress: no change         Summary: Diverticulitis    Date/shift: 1/27/24    Cognitive Concerns/ Orientation : A&O x4, pleasant, coop.   BEHAVIOR & AGGRESSION TOOL COLOR: Green  ABNL VS/O2: VSS on RA.  MOBILITY: Up ad jessica, Ind, amb to BR/in room.   PAIN MANAGMENT: PRN tylenol effective.   DIET: Clear liq, tolerating.   BOWEL/BLADDER: Cont B/B. BM x5.  ABNL LAB: WBC 13.8, .26, Lactic acid 0.8.  DRAIN/DEVICES: R hand PIV infusing NS @50ml/hr.  SKIN: WDL  TESTS/PROCEDURES: None  D/C DAY/GOALS/PLACE: Pending progress.  OTHER IMPORTANT INFO: Receiving zosyn, CRS following. Resting or watching tv between cares.

## 2024-01-27 NOTE — PLAN OF CARE
Goal Outcome Evaluation:  Shift: 01/26/24-01/27/24 [id number]  Summary: diverticulitis, abdominal pain    Cognitive Concerns/ Orientation : A& O x 4, pleasant   BEHAVIOR & AGGRESSION TOOL COLOR: green   ABNL VS/O2: VSS RA  MOBILITY: ind  PAIN MANAGMENT: PRN morphine x 2 given and PRN Tylenol given x1  DIET: Clears - tolerating well  BOWEL/BLADDER: cont B/B. BM x4 this shift.  ABNL LAB/BG: see chart  DRAIN/DEVICES: 2 right PIV. R arm infusing NS @100ml/hr. intermittent IV abx.  SKIN: WDL  TESTS/PROCEDURES: none  D/C DAY/GOALS/PLACE: pending improvements  OTHER IMPORTANT INFO: Colorectal surgery consult. BC result pending.

## 2024-01-27 NOTE — PROGRESS NOTES
[facility]    Medicine Progress Note - Hospitalist Service    Date of Admission:  1/25/2024    Assessment & Plan   Misty Serrano is a 28 year old female admitted on 1/25/2024. She a past medical history of benign essential hypertension, recurrent diverticulitis, depression and anxiety who presents with 1 month left lower quadrant abdominal pain recently treated with OP Augmentin for diverticulitis.   Presented back with more ABD complaints and imaging concerning for worsening diverticulitis.    Acute diverticulitis with progression despite recent treatment:  -  Patient with another high fever afternoon 1/26 and some more pain overnight into AM 1/27.  As AM progressed though now feeling better.  Continue Zosyn IV + Pain control.  Should continue in the hospital for at least 1-2 more days given ongoing pain and recent high fever less than 24 hours ago.  Still difficult to say if making substantial improvement.  She felt worse last night but better today.  CRP is higher along with WBC, will recheck in AM.  -  CRS consult appreciated, plan for clear liquids and inpatient treatment for now.  If worsening will need more urgent surgery.  If improves, likely can discharge in a couple days and then be on a prolonged abx course with close CRS follow-up.  She may still need surgery, but she would be lower risk for issues and be possibly able to avoid an ostomy if she can have surgery in 6-8 weeks electively.  -  Will defer diet advancement to CRS    Essential hypertension:  [PTA: amlodipine-valsartan 5-160 mg daily]  Normotensive on admission. In light of fever and infection will held PTA antihypertensive.  BP now trending up.  Will resume portion of medications starting with amlodipine 2.5 mg daily.  -Can reassess daily regarding appropriateness of resuming more to PTA dose      Depression & anxiety:  Stable with regular counseling; has declined medications in the past       Medical Decision  "Making       40 MINUTES SPENT BY ME on the date of service doing chart review, history, exam, documentation & further activities per the note.      Labs/Imaging Reviewed:  See Information above and Data section below    PPE Used:  Mask       Diet: Full Liquid Diet    DVT Prophylaxis: Pneumatic Compression Devices and Ambulate every shift  Clinton Catheter: Not present  Lines: None     Cardiac Monitoring: None  Code Status: Full Code      Clinically Significant Risk Factors                  # Hypertension: Noted on problem list        # Obesity: Estimated body mass index is 39.58 kg/m  as calculated from the following:    Height as of 1/4/24: 1.753 m (5' 9\").    Weight as of 1/4/24: 121.6 kg (268 lb)., PRESENT ON ADMISSION            Disposition Plan     Expected Discharge Date: 01/29/2024                  Onesimo Acosta DO  Hospitalist Service  [facility]  Securely message with [employer] (more info)  Text page via [employer] Paging/Directory   ______________________________________________________________________    Interval History   [place] ill last evening after high fever.  Earlier this AM had more ABD pain after some clears.  As AM progressed though has felt better.  Denies current nausea.  Overall feeling a little better than yesterday.    Physical Exam   Vital Signs: Temp: 98.9  F (37.2  C) Temp src: Oral BP: (!) 144/90 Pulse: 100   Resp: 18 SpO2: 99 % O2 Device: None (Room air)    Weight: 0 lbs 0 oz    GEN:  Alert, oriented x 3, appears ill but comfortable, no overt distress.  HEENT:  Normocephalic/atraumatic, no scleral icterus, no nasal discharge, mouth moist.  CV:  Regular rate and rhythm, borderline tachy.  No loud murmur.  LUNGS:  Clear to auscultation bilaterally without rales/rhonchi/wheezing/retractions.  Symmetric chest rise on inhalation noted.  ABD:  Active bowel sounds, soft, tender mostly left side similar to prior.  No guarding/rigidity.  EXT:  No edema.  No cyanosis.  No acute " joint synovitis noted.  SKIN:  Dry to touch, no exanthems noted in the visualized areas.    Medications    sodium chloride 50 mL/hr at 01/26/24 2111      [Held by provider] amlodipine-valsartan (EXFORGE) 5-160 mg combo dose   Oral Daily    piperacillin-tazobactam  3.375 g Intravenous Q6H    sodium chloride (PF)  3 mL Intracatheter Q8H       Data     Labs and Imaging results below reviewed today.    I have personally reviewed the following data over the past 24 hrs:    13.8 (H)  \   9.6 (L)   / 403     137 100 3.3 (L) /  105 (H)   3.4 26 0.51 \     Procal: N/A CRP: 243.26 (H) Lactic Acid: 0.8             No results found for this or any previous visit (from the past 24 hour(s)).

## 2024-01-28 LAB
ANION GAP SERPL CALCULATED.3IONS-SCNC: 11 MMOL/L (ref 7–15)
BUN SERPL-MCNC: 3.1 MG/DL (ref 6–20)
CALCIUM SERPL-MCNC: 9.1 MG/DL (ref 8.6–10)
CHLORIDE SERPL-SCNC: 101 MMOL/L (ref 98–107)
CREAT SERPL-MCNC: 0.54 MG/DL (ref 0.51–0.95)
CRP SERPL-MCNC: 221.26 MG/L
DEPRECATED HCO3 PLAS-SCNC: 26 MMOL/L (ref 22–29)
EGFRCR SERPLBLD CKD-EPI 2021: >90 ML/MIN/1.73M2
ERYTHROCYTE [DISTWIDTH] IN BLOOD BY AUTOMATED COUNT: 13.4 % (ref 10–15)
GLUCOSE SERPL-MCNC: 130 MG/DL (ref 70–99)
HCT VFR BLD AUTO: 30.2 % (ref 35–47)
HGB BLD-MCNC: 9.8 G/DL (ref 11.7–15.7)
MAGNESIUM SERPL-MCNC: 2.2 MG/DL (ref 1.7–2.3)
MCH RBC QN AUTO: 26.7 PG (ref 26.5–33)
MCHC RBC AUTO-ENTMCNC: 32.5 G/DL (ref 31.5–36.5)
MCV RBC AUTO: 82 FL (ref 78–100)
PLATELET # BLD AUTO: 426 10E3/UL (ref 150–450)
POTASSIUM SERPL-SCNC: 3.2 MMOL/L (ref 3.4–5.3)
POTASSIUM SERPL-SCNC: 3.5 MMOL/L (ref 3.4–5.3)
RBC # BLD AUTO: 3.67 10E6/UL (ref 3.8–5.2)
SODIUM SERPL-SCNC: 138 MMOL/L (ref 135–145)
WBC # BLD AUTO: 10 10E3/UL (ref 4–11)

## 2024-01-28 PROCEDURE — 250N000013 HC RX MED GY IP 250 OP 250 PS 637: Performed by: INTERNAL MEDICINE

## 2024-01-28 PROCEDURE — 83735 ASSAY OF MAGNESIUM: CPT | Performed by: INTERNAL MEDICINE

## 2024-01-28 PROCEDURE — 99233 SBSQ HOSP IP/OBS HIGH 50: CPT | Performed by: INTERNAL MEDICINE

## 2024-01-28 PROCEDURE — 250N000011 HC RX IP 250 OP 636: Performed by: HOSPITALIST

## 2024-01-28 PROCEDURE — 120N000001 HC R&B MED SURG/OB

## 2024-01-28 PROCEDURE — 85027 COMPLETE CBC AUTOMATED: CPT | Performed by: INTERNAL MEDICINE

## 2024-01-28 PROCEDURE — 80048 BASIC METABOLIC PNL TOTAL CA: CPT | Performed by: INTERNAL MEDICINE

## 2024-01-28 PROCEDURE — 250N000013 HC RX MED GY IP 250 OP 250 PS 637

## 2024-01-28 PROCEDURE — 36415 COLL VENOUS BLD VENIPUNCTURE: CPT | Performed by: INTERNAL MEDICINE

## 2024-01-28 PROCEDURE — 86140 C-REACTIVE PROTEIN: CPT | Performed by: INTERNAL MEDICINE

## 2024-01-28 PROCEDURE — 84132 ASSAY OF SERUM POTASSIUM: CPT | Performed by: INTERNAL MEDICINE

## 2024-01-28 RX ORDER — OXYCODONE HYDROCHLORIDE 5 MG/1
5 TABLET ORAL EVERY 4 HOURS PRN
Status: DISCONTINUED | OUTPATIENT
Start: 2024-01-28 | End: 2024-01-29 | Stop reason: HOSPADM

## 2024-01-28 RX ORDER — METRONIDAZOLE 500 MG/1
500 TABLET ORAL 3 TIMES DAILY
Status: DISCONTINUED | OUTPATIENT
Start: 2024-01-28 | End: 2024-01-29 | Stop reason: HOSPADM

## 2024-01-28 RX ORDER — LEVOFLOXACIN 500 MG/1
500 TABLET, FILM COATED ORAL DAILY
Status: DISCONTINUED | OUTPATIENT
Start: 2024-01-28 | End: 2024-01-29 | Stop reason: HOSPADM

## 2024-01-28 RX ORDER — AMLODIPINE BESYLATE 5 MG/1
5 TABLET ORAL DAILY
Status: DISCONTINUED | OUTPATIENT
Start: 2024-01-29 | End: 2024-01-29 | Stop reason: HOSPADM

## 2024-01-28 RX ORDER — AMLODIPINE BESYLATE 2.5 MG/1
2.5 TABLET ORAL ONCE
Status: COMPLETED | OUTPATIENT
Start: 2024-01-28 | End: 2024-01-28

## 2024-01-28 RX ORDER — VALSARTAN 80 MG/1
80 TABLET ORAL DAILY
Status: DISCONTINUED | OUTPATIENT
Start: 2024-01-29 | End: 2024-01-29 | Stop reason: HOSPADM

## 2024-01-28 RX ORDER — POTASSIUM CHLORIDE 1500 MG/1
40 TABLET, EXTENDED RELEASE ORAL ONCE
Status: COMPLETED | OUTPATIENT
Start: 2024-01-28 | End: 2024-01-28

## 2024-01-28 RX ADMIN — ACETAMINOPHEN 650 MG: 325 TABLET, FILM COATED ORAL at 13:27

## 2024-01-28 RX ADMIN — PIPERACILLIN AND TAZOBACTAM 3.38 G: 3; .375 INJECTION, POWDER, FOR SOLUTION INTRAVENOUS at 05:11

## 2024-01-28 RX ADMIN — ACETAMINOPHEN 650 MG: 325 TABLET, FILM COATED ORAL at 06:53

## 2024-01-28 RX ADMIN — AMLODIPINE BESYLATE 2.5 MG: 2.5 TABLET ORAL at 10:45

## 2024-01-28 RX ADMIN — ACETAMINOPHEN 325 MG: 325 TABLET, FILM COATED ORAL at 00:07

## 2024-01-28 RX ADMIN — ACETAMINOPHEN 325 MG: 325 TABLET, FILM COATED ORAL at 00:04

## 2024-01-28 RX ADMIN — METRONIDAZOLE 500 MG: 500 TABLET ORAL at 21:18

## 2024-01-28 RX ADMIN — POTASSIUM CHLORIDE 40 MEQ: 1500 TABLET, EXTENDED RELEASE ORAL at 10:45

## 2024-01-28 RX ADMIN — AMLODIPINE BESYLATE 2.5 MG: 2.5 TABLET ORAL at 07:44

## 2024-01-28 RX ADMIN — PIPERACILLIN AND TAZOBACTAM 3.38 G: 3; .375 INJECTION, POWDER, FOR SOLUTION INTRAVENOUS at 17:03

## 2024-01-28 RX ADMIN — ACETAMINOPHEN 650 MG: 325 TABLET, FILM COATED ORAL at 20:14

## 2024-01-28 RX ADMIN — LEVOFLOXACIN 500 MG: 500 TABLET, FILM COATED ORAL at 20:14

## 2024-01-28 RX ADMIN — PIPERACILLIN AND TAZOBACTAM 3.38 G: 3; .375 INJECTION, POWDER, FOR SOLUTION INTRAVENOUS at 10:45

## 2024-01-28 ASSESSMENT — ACTIVITIES OF DAILY LIVING (ADL)
ADLS_ACUITY_SCORE: 18

## 2024-01-28 NOTE — PLAN OF CARE
Summary: Diverticulitis     Date/shift: 1/27/24 [id number]  Cognitive Concerns/ Orientation: Alert/Oriented x4  BEHAVIOR & AGGRESSION TOOL COLOR: Green  ABNL VS/O2: VSS, room air  MOBILITY: Independent  PAIN MANAGMENT: PRN tylenol for L abdominal discomfort  DIET: Full liquids   BOWEL/BLADDER: Continent-several BMs during day  ABNL LAB: WBC 13.8; HGB 9.6; hematocrit 30.7; .26; Lactic 0.8.  DRAIN/DEVICES: R hand PIV infusing Normal Saline @50ml/hr, Q6h Zosyn   SKIN: WDL  TESTS/PROCEDURES: AM labs   D/C DAY/GOALS/PLACE: Pending progress, possible Sunday or Monday

## 2024-01-28 NOTE — PLAN OF CARE
Goal Outcome Evaluation:      Plan of Care Reviewed With: patient    Overall Patient Progress: improvingOverall Patient Progress: improving         Summary: Diverticulitis     Date/shift: 1/28/24   Cognitive Concerns/ Orientation: A&O x4, pleasant, calm and coop.   BEHAVIOR & AGGRESSION TOOL COLOR: Green  ABNL VS/O2: VSS on RA. PTA amlodipine 5mg restarted.   MOBILITY: Ind, up ad jessica in room.  PAIN MANAGMENT: PRN tylenol effective for 3/10 abd discomfort.   DIET: Advanced to Low fiber diet, tolerating, good appetite.   BOWEL/BLADDER: Continent b/b. BM x4.  ABNL LAB: WBC 10, .26 trends down. K 3.2, replaced, recheck at 1540.  DRAIN/DEVICES: R hand PIV SL.  SKIN: WDL  TESTS/PROCEDURES: Following labs.   D/C DAY/GOALS/PLACE: Pending progress, likely Monday.  OTHER: Received zosyn, plan to change to PO. CRS following. Education provided re: diverticulitis and low fiber diet.

## 2024-01-28 NOTE — PLAN OF CARE
Summary: Diverticulitis    Date/shift: 1/27/24 Evenings   Cognitive Concerns/ Orientation : A&OX4. Calm, cooperative, and pleasant.   BEHAVIOR & AGGRESSION TOOL COLOR: Green  ABNL VS/O2: VSS on RA.  MOBILITY: Up ad jessica. Moving freq.   PAIN MANAGMENT: PRN tylenol x1 for 3/10 abdominal discomforts.   DIET: Advanced to Fulls and tolerating. Good fluid intake.   BOWEL/BLADDER: Continent. Using bathroom. Reports freq small BMs >6 today.   ABNL LAB: WBC 13.8, .26, Lactic 0.8.  DRAIN/DEVICES: R hand PIV infusing NS @50ml/hr with Q6h Zosyn infusions.   SKIN: WDL. Pt showered this evening. Linens changed.   TESTS/PROCEDURES: AM labs   D/C DAY/GOALS/PLACE: Pending progress, possible Sunday or Monday.   OTHER IMPORTANT INFO: Calls as needed. Tolerating diet and pain manageable with Tylenol. Family at bedside this evening supportive of pt.

## 2024-01-28 NOTE — PROGRESS NOTES
[facility]    Medicine Progress Note - Hospitalist Service    Date of Admission:  1/25/2024    Assessment & Plan   Misty Serrano is a 28 year old female admitted on 1/25/2024. She a past medical history of benign essential hypertension, recurrent diverticulitis, depression and anxiety who presents with 1 month left lower quadrant abdominal pain recently treated with OP Augmentin for diverticulitis.   Presented back with more ABD complaints and imaging concerning for worsening diverticulitis.     Acute diverticulitis with progression despite recent treatment:  -  Patient with another high fever afternoon 1/26 and some more pain overnight into AM 1/27.  As AM progressed though now feeling better.      Overall is feeling better today.    Still with frequent stools but tolerating full liquids  Only requiring prn po tylenol for pain in the last 24 hours.  Tolerating IV zosyn  Leukocytosis has normalized    CRS following - appreciated    She may still need surgery, but she would be lower risk for issues and be possibly able to avoid an ostomy if she can have surgery in 6-8 weeks electively.    2. Essential hypertension:  [PTA: amlodipine-valsartan 5-160 mg daily]  Normotensive on admission.     In light of fever and infection PTA antihypertensive medications were initially held  Norvasc 2.5mg po started 1/27/24  BP now trending up.      Will increase norvasc to 5mg/day this am and continue to monitor closely     3. Hypokalemia    Supplement today   Check magnesium    4. Depression & anxiety:  Stable with regular counseling; has declined medications in the past     Mood is stable    Medical Decision Making       50 MINUTES SPENT BY ME on the date of service doing chart review, history, exam, documentation & further activities per the note.        PPE Worn:  Mask, gloves     Diet: Full Liquid Diet    DVT Prophylaxis: Pneumatic Compression Devices  Clinton Catheter: Not present  Lines: None    "  Cardiac Monitoring: None  Code Status: Full Code      Clinically Significant Risk Factors                  # Hypertension: Noted on problem list        # Obesity: Estimated body mass index is 39.58 kg/m  as calculated from the following:    Height as of 1/4/24: 1.753 m (5' 9\").    Weight as of 1/4/24: 121.6 kg (268 lb)., PRESENT ON ADMISSION            Disposition Plan     Expected Discharge Date: 01/29/2024                  Jennifer Juarez DO  Hospitalist Service  [facility]  Securely message with [employer] (more info)  Text page via [facility] Paging/Directory   ______________________________________________________________________    Interval History     Feeling much better.  Still with loose stools and up 3-4 times overnight.  No N/V.  Able to tolerate full liquids.  Drinking well.  No CP, SOB, F/C or N/V.  No HA    Physical Exam   Vital Signs: Temp: 98.2  F (36.8  C) Temp src: Oral BP: 123/89 Pulse: 99   Resp: 18 SpO2: 98 % O2 Device: None (Room air)    Weight: 0 lbs 0 oz    GEN:  Alert, oriented x 3, appears comfortable, no overt respiratory distress.  HEENT:  Normocephalic/atraumatic, no scleral icterus, no nasal discharge, mouth and membranes moist.  CV:  Regular rate and rhythm, no murmur or JVD.  S1 + S2 noted, no S3 or S4.  LUNGS:  Clear to auscultation ant/post bilaterally without rales/rhonchi/wheezing/retractions.  Symmetric chest rise on inhalation noted.  ABD:  Active bowel sounds, soft, very mild reproducible tenderness in the /non-distended.  No rebound/guarding/rigidity.  EXT:  No edema.  No cyanosis.  No acute joint synovitis noted.  SKIN:  Dry to touch, no exanthems noted in the visualized areas.    Medications    sodium chloride 50 mL/hr at 01/27/24 2101      amLODIPine  2.5 mg Oral Daily    [Held by provider] amlodipine-valsartan (EXFORGE) 5-160 mg combo dose   Oral Daily    piperacillin-tazobactam  3.375 g Intravenous Q6H    sodium chloride (PF)  3 mL " Intracatheter Q8H       Data     Labs and Imaging results below reviewed today.  Recent Labs   Lab 01/28/24  0815 01/27/24  1039 01/26/24  0647   WBC 10.0 13.8* 12.5*   HGB 9.8* 9.6* 8.9*   HCT 30.2* 30.7* 28.1*   MCV 82 84 83    403 353     Recent Labs   Lab 01/28/24  0815 01/27/24  1039 01/25/24  1606    137 140   POTASSIUM 3.2* 3.4 4.1   CHLORIDE 101 100 101   CO2 26 26 28   ANIONGAP 11 11 11   * 105* 101*   BUN 3.1* 3.3* 4.1*   CR 0.54 0.51 0.59   GFRESTIMATED >90 >90 >90   JOSÉ 9.1 9.1 9.6     Recent Labs   Lab 01/28/24  0815 01/27/24  1039 01/25/24  1606    137 140   POTASSIUM 3.2* 3.4 4.1   CHLORIDE 101 100 101   CO2 26 26 28   ANIONGAP 11 11 11   * 105* 101*   BUN 3.1* 3.3* 4.1*   CR 0.54 0.51 0.59   GFRESTIMATED >90 >90 >90   JOSÉ 9.1 9.1 9.6   PROTTOTAL  --   --  7.9   ALBUMIN  --   --  3.9   BILITOTAL  --   --  0.5   ALKPHOS  --   --  78   AST  --   --  18   ALT  --   --  34     Recent Labs   Lab 01/25/24  1626   COLOR Yellow   APPEARANCE Clear   URINEGLC Negative   URINEBILI Negative   URINEKETONE 10*   SG 1.024   UBLD Small*   URINEPH 6.0   PROTEIN 20*   NITRITE Negative   LEUKEST Negative   RBCU 5*   WBCU 3       No results found for this or any previous visit (from the past 24 hour(s)).

## 2024-01-28 NOTE — PROGRESS NOTES
Colorectal Surgery Progress Note      Subjective:  No acute events overnight.  Pain much improved. NO nausea or emesis. Tolerating a full liquid diet. Ambulating. No fevers.    Vitals:  Vitals:    01/28/24 0725 01/28/24 1033 01/28/24 1044 01/28/24 1101   BP: (!) 130/90  (!) 135/91 120/88   BP Location: Left arm  Left arm Left arm   Patient Position:       Pulse: 108   89   Resp: 18   16   Temp: 98.6  F (37  C)   97.8  F (36.6  C)   TempSrc: Oral   Oral   SpO2: 95%   97%   Weight:  255 lb 6.4 oz       I/O:  I/O last 3 completed shifts:  In: 1560 [P.O.:760; I.V.:800]  Out: -     Physical Exam:  Gen: NAD  Cardio: RRR  Chest: NLB on RA  Abd: Soft, non-distended, non  tender, no guarding, no rebound.    BMP  Recent Labs   Lab 01/28/24  0815 01/27/24  1039 01/25/24  1606    137 140   POTASSIUM 3.2* 3.4 4.1   CHLORIDE 101 100 101   CO2 26 26 28   BUN 3.1* 3.3* 4.1*   CR 0.54 0.51 0.59   * 105* 101*   MAG 2.2  --   --      CBC  Recent Labs   Lab 01/28/24  0815 01/27/24  1039 01/26/24  0647 01/25/24  1606   WBC 10.0 13.8* 12.5* 15.9*   HGB 9.8* 9.6* 8.9* 10.7*   HCT 30.2* 30.7* 28.1* 34.8*    403 353 431         ASSESSMENT: This is a 28 year old female recovering well from complicated diverticulitis.    PLAN:  Advanced to low fiber diet  OK for discharge home from colorectal standpoint when  tolerating PO abx. She had failed outpatient management on Augmentin, consider levo/flagyl.   Anticipate discharge later today or  tomorrow pending tolerance of low fiber diet.   Follow  up with colorectal as an outpatient. Will have clinic reach out to schedule.    Isha Ely MD  Colon and Rectal Surgery   [phone]

## 2024-01-29 VITALS
SYSTOLIC BLOOD PRESSURE: 130 MMHG | RESPIRATION RATE: 16 BRPM | TEMPERATURE: 98 F | OXYGEN SATURATION: 97 % | HEART RATE: 90 BPM | BODY MASS INDEX: 37.72 KG/M2 | WEIGHT: 255.4 LBS | DIASTOLIC BLOOD PRESSURE: 85 MMHG

## 2024-01-29 LAB — POTASSIUM SERPL-SCNC: 3.2 MMOL/L (ref 3.4–5.3)

## 2024-01-29 PROCEDURE — 36415 COLL VENOUS BLD VENIPUNCTURE: CPT | Performed by: INTERNAL MEDICINE

## 2024-01-29 PROCEDURE — 250N000013 HC RX MED GY IP 250 OP 250 PS 637

## 2024-01-29 PROCEDURE — 250N000013 HC RX MED GY IP 250 OP 250 PS 637: Performed by: INTERNAL MEDICINE

## 2024-01-29 PROCEDURE — 99239 HOSP IP/OBS DSCHRG MGMT >30: CPT | Performed by: INTERNAL MEDICINE

## 2024-01-29 PROCEDURE — 84132 ASSAY OF SERUM POTASSIUM: CPT | Performed by: INTERNAL MEDICINE

## 2024-01-29 RX ORDER — LEVOFLOXACIN 500 MG/1
500 TABLET, FILM COATED ORAL DAILY
Qty: 7 TABLET | Refills: 0 | Status: CANCELLED
Start: 2024-01-29 | End: 2024-02-05

## 2024-01-29 RX ORDER — METRONIDAZOLE 500 MG/1
500 TABLET ORAL 3 TIMES DAILY
Qty: 30 TABLET | Refills: 0 | Status: SHIPPED | OUTPATIENT
Start: 2024-01-29 | End: 2024-02-08

## 2024-01-29 RX ORDER — LEVOFLOXACIN 500 MG/1
500 TABLET, FILM COATED ORAL DAILY
Qty: 10 TABLET | Refills: 0 | Status: SHIPPED | OUTPATIENT
Start: 2024-01-30 | End: 2024-03-18

## 2024-01-29 RX ORDER — POTASSIUM CHLORIDE 1500 MG/1
40 TABLET, EXTENDED RELEASE ORAL ONCE
Status: COMPLETED | OUTPATIENT
Start: 2024-01-29 | End: 2024-01-29

## 2024-01-29 RX ADMIN — ACETAMINOPHEN 650 MG: 325 TABLET, FILM COATED ORAL at 08:53

## 2024-01-29 RX ADMIN — LEVOFLOXACIN 500 MG: 500 TABLET, FILM COATED ORAL at 10:49

## 2024-01-29 RX ADMIN — AMLODIPINE BESYLATE 5 MG: 5 TABLET ORAL at 07:47

## 2024-01-29 RX ADMIN — METRONIDAZOLE 500 MG: 500 TABLET ORAL at 07:47

## 2024-01-29 RX ADMIN — POTASSIUM CHLORIDE 40 MEQ: 1500 TABLET, EXTENDED RELEASE ORAL at 10:11

## 2024-01-29 ASSESSMENT — ACTIVITIES OF DAILY LIVING (ADL)
ADLS_ACUITY_SCORE: 18

## 2024-01-29 NOTE — PLAN OF CARE
Summary: Diverticulitis     Date/shift: 1/28/24 [id number]  Cognitive Concerns/ Orientation: Alert/Oriented x4  BEHAVIOR & AGGRESSION TOOL COLOR: Green  ABNL VS/O2: VSS, room air  MOBILITY: Independent  PAIN MANAGMENT: PRN tylenol for L abdominal discomfort  DIET: Low fiber, tolerating per pt report  BOWEL/BLADDER: Continent-several BMs during day/evening  ABNL LAB: K 3.5 (replaced on day shift, recheck in am); HGB 9.8; hematocrit 30.2; .26  DRAIN/DEVICES: R hand PIV saline locked, IV antibiotics complete-now oral Flagyl  SKIN: WDL  TESTS/PROCEDURES: n/a  D/C DAY/GOALS/PLACE: Possibly 01/29  OTHER IMPORTANT INFORMATION: Potassium replaced on days, recheck in am; Colorectal surgery following

## 2024-01-29 NOTE — DISCHARGE SUMMARY
[facility]    Discharge Summary  Hospitalist    Date of Admission:  1/25/2024  Date of Discharge:  1/29/2024  Discharging Provider: Dwayne Rosa MD    Discharge Diagnoses     Recurrent acute complicated diverticulitis.  Essential hypertension  Hypokalemia  Depression & anxiety       Hospital Course:  Misty Serrano is a 28 year old female admitted on 1/25/2024. She a past medical history of benign essential hypertension, recurrent diverticulitis, depression and anxiety who presents with 1 month left lower quadrant abdominal pain recently treated with OP Augmentin for diverticulitis.  .    Recurrent acute complicated diverticulitis with possible fistulous connection to the left ovary.  -Presented with fever and worsening abdominal pain   -Previously seen in the emergency room on 1/4/2024 and given a course of Augmentin which she completed on 1/12/2024 however had progression of abdominal pain and came to ER  -CT abdomen showed interval worsening sigmoid colon diverticulitis with interval development of small fluid and gas-filled sinus tract extending into sigmoid mesenteric fat which extends to the left ovary.  No drainable abscess.  -Patient was started on empiric IV Zosyn  -Colorectal surgery followed the patient, patient improved with conservative treatment with IV antibiotics.  Colorectal surgery is planning outpatient follow-up in approximately 2 weeks with a repeat CT scan of the abdomen and pelvis to assess intra-abdominal findings.  Patient is aware that she will likely require elective surgery in approximately 6 to 8 weeks pending CT findings at 2-week follow-up.  -At the moment she is tolerating low fiber diet without any problem, pain has improved.  -She will discharge home on 10 more days of antibiotic to complete a 2-week course.  Given she relapsed on Augmentin, colorectal surgery recommended Levaquin/Flagyl for outpatient treatment.  -Continue low fiber diet for now        Essential hypertension:  -Continue PTA: amlodipine-valsartan 5-160 mg daily     Hypokalemia  -Replaced per protocol     Depression & anxiety  -Stable with regular counseling; has declined medications in the past       Dwayne Rosa MD,    Significant Results and Procedures   See below    Pending Results     Unresulted Labs Ordered in the Past 30 Days of this Admission       Date and Time Order Name Status Description    1/25/2024  4:05 PM Blood Culture Peripheral Blood Preliminary     1/25/2024  4:05 PM Blood Culture Peripheral Blood Preliminary             Code Status   Full Code       Primary Care Physician   Physician No Ref-Primary    Physical Exam   Temp: 98  F (36.7  C) Temp src: Oral BP: 130/85 Pulse: 90   Resp: 16 SpO2: 97 % O2 Device: None (Room air)      Constitutional: AAOX3, NAD  Respiratory: CTA B/L, Normal WOB  Cardiovascular: RRR, No murmur  GI: Soft, Non- tender, BS- normoactive  Neuro: CN- grossly intact    Discharge Disposition   Discharged to home  Condition at discharge: Stable    Consultations This Hospital Stay   COLORECTAL SURGERY IP CONSULT    Time Spent on this Encounter   I, Dwayne Rosa MD, personally saw the patient today and spent greater than 30 minutes discharging this patient.    Discharge Orders      Reason for your hospital stay    Acute recurrent diverticulitis     Follow-up and recommended labs and tests     Follow up with primary care provider, within 7 days for hospital follow- up.    Colorectal surgery in 2 weeks     Activity    Your activity upon discharge: activity as tolerated     Diet    Follow this diet upon discharge: Orders Placed This Encounter      Low Fiber Diet     Discharge Medications   Current Discharge Medication List        START taking these medications    Details   levofloxacin (LEVAQUIN) 500 MG tablet Take 1 tablet (500 mg) by mouth daily  Qty: 10 tablet, Refills: 0    Associated Diagnoses: Diverticulitis      metroNIDAZOLE (FLAGYL) 500 MG tablet  Take 1 tablet (500 mg) by mouth 3 times daily for 10 days  Qty: 30 tablet, Refills: 0    Associated Diagnoses: Diverticulitis           CONTINUE these medications which have NOT CHANGED    Details   acetaminophen (TYLENOL) 325 MG tablet Take 325-650 mg by mouth every 4 hours as needed for mild pain      amLODIPine-valsartan (EXFORGE) 5-160 MG tablet Take 1 tablet by mouth daily  Qty: 90 tablet, Refills: 3    Associated Diagnoses: Benign essential hypertension      Etonogestrel (NEXPLANON SC)       hyoscyamine (LEVSIN) 0.125 MG tablet TAKE 1 TABLET(125 MCG) BY MOUTH EVERY 6 HOURS AS NEEDED FOR CRAMPING  Qty: 30 tablet, Refills: 1    Associated Diagnoses: Abdominal cramping      lactobacillus rhamnosus, GG, (CULTURELL) capsule Take 1 capsule by mouth daily           Allergies   No Known Allergies  Data   Most Recent 3 CBC's:  Recent Labs   Lab Test 01/28/24  0815 01/27/24  1039 01/26/24  0647   WBC 10.0 13.8* 12.5*   HGB 9.8* 9.6* 8.9*   MCV 82 84 83    403 353      Most Recent 3 BMP's:  Recent Labs   Lab Test 01/29/24  0839 01/28/24  1534 01/28/24  0815 01/27/24  1039 01/25/24  1606   NA  --   --  138 137 140   POTASSIUM 3.2* 3.5 3.2* 3.4 4.1   CHLORIDE  --   --  101 100 101   CO2  --   --  26 26 28   BUN  --   --  3.1* 3.3* 4.1*   CR  --   --  0.54 0.51 0.59   ANIONGAP  --   --  11 11 11   JOSÉ  --   --  9.1 9.1 9.6   GLC  --   --  130* 105* 101*     Most Recent 2 LFT's:  Recent Labs   Lab Test 01/25/24  1606 01/04/24 2006   AST 18 26   ALT 34 32   ALKPHOS 78 97   BILITOTAL 0.5 0.4     Most Recent INR's and Anticoagulation Dosing History:  Anticoagulation Dose History           No data to display              Most Recent 3 Troponin's:No lab results found.  Most Recent Cholesterol Panel:  Recent Labs   Lab Test 07/14/23  1628   CHOL 132   LDL 80   HDL 35*   TRIG 86     Most Recent 6 Bacteria Isolates From Any Culture (See EPIC Reports for Culture Details):  Recent Labs   Lab Test 02/01/21  1027   CULT >100,000  colonies/mL  mixed urogenital madyson       Most Recent TSH, T4 and A1c Labs:  Recent Labs   Lab Test 07/14/23  1628 07/12/22  1756   TSH  --  1.56   A1C 6.0* 5.9*       Results for orders placed or performed during the hospital encounter of 01/25/24   Abd/pelvis CT,  IV  contrast only TRAUMA / AAA    Narrative    EXAM: CT ABDOMEN PELVIS W CONTRAST  LOCATION: [facility]  DATE: 1/25/2024    INDICATION: recent divertiultis dx, now worse pain, sepsis  COMPARISON: 01/04/2024  TECHNIQUE: CT scan of the abdomen and pelvis was performed following injection of IV contrast. Multiplanar reformats were obtained. Dose reduction techniques were used.  CONTRAST: 135mL Isovue 370    FINDINGS:   LOWER CHEST: Normal.    HEPATOBILIARY: Normal.    PANCREAS: Normal.    SPLEEN: Normal.    ADRENAL GLANDS: Normal.    KIDNEYS/BLADDER: Normal.    BOWEL: Worsening of severe proximal sigmoid diverticulitis. There is extensive adjacent fat stranding. There are new small fluid and gas-containing tracts within the sigmoid mesenteric fat, one of which extends towards the left ovary. No drainable   abscess. No gross intraperitoneal free air.    LYMPH NODES: Normal.    VASCULATURE: Unremarkable.    PELVIC ORGANS: Normal.    MUSCULOSKELETAL: Normal.      Impression    IMPRESSION:   1.  Interval worsening of proximal sigmoid colon diverticulitis with interval development of small fluid and gas-filled sinus tracts extending into the sigmoid mesenteric fat, one of which extends to the left ovary. No drainable abscess.

## 2024-01-29 NOTE — PLAN OF CARE
Goal Outcome Evaluation: reviewed with patient  DATE & TIME: 1/29/2024 days     Cognitive Concerns/ Orientation :  alert and oriented x 4   BEHAVIOR & AGGRESSION TOOL COLOR:  green   CIWA SCORE:  na    ABNL VS/O2:  VSS RA  MOBILITY up independently   PAIN MANAGMENT:  medicated once with tylenol with relief   DIET:  low fiber tolerated well   BOWEL/BLADDER:  continent one small stool   ABNL LAB/BG:  K 3.2 replaced   DRAIN/DEVICES:  SL removed   TELEMETRY RHYTHM:  na   SKIN:  intact   TESTS/PROCEDURES:  none  D/C DATE: today   Discharge Barriers:  none   OTHER IMPORTANT INFO:  pt ready for discharge, given instructions

## 2024-01-29 NOTE — PROGRESS NOTES
Discharge    Patient discharged to home  via  private transportation  with  mother   Care plan note done     Listed belongings gathered and given to patient (including from security/pharmacy). Yes  Care Plan and Patient education resolved: Yes  Prescriptions if needed, hard copies sent with patient  NA  Medication Bin checked and emptied on discharge Yes  SW/care coordinator/charge RN aware of discharge: Yes

## 2024-01-29 NOTE — PLAN OF CARE
Summary: Diverticulitis     Date/shift: 1/28/24 Evenings   Cognitive Concerns/ Orientation: A&O x4, pleasant, calm and cooperative.   BEHAVIOR & AGGRESSION TOOL COLOR: Green  ABNL VS/O2: VSS on RA.  MOBILITY: Ind, up ad jessica in room.  PAIN MANAGMENT: PRN tylenol x1 effective for 3/10 abd discomfort.   DIET: Advanced to Low fiber diet, tolerating, good appetite ad fluid intake.   BOWEL/BLADDER: Continent b/b. 4-5 small BMs this shift.   ABNL LAB: WBC 10, .26 trends down. K 3.2, replaced, recheck 3.5. Labs scheduled for AM.   DRAIN/DEVICES: R hand PIV SL.  SKIN: WDL  TESTS/PROCEDURES: Potassium protocol labs placed for AM.   D/C DAY/GOALS/PLACE: Pending progress, likely Monday, switched to oral abx this evening. Tolerating Low Fiber diet.   OTHER: Zosyn changed to PO abx this evening. CRS following. Education provided re: diverticulitis and low fiber diet. Calls as needed.

## 2024-01-29 NOTE — PROGRESS NOTES
COLON & RECTAL SURGERY  PROGRESS NOTE    January 29, 2024  SUBJECTIVE:  Denies abdominal pain.  Tolerating a low-fiber diet.  Having bowel function.  On oral antibiotics.    OBJECTIVE:  Temp:  [97.8  F (36.6  C)-98  F (36.7  C)] 98  F (36.7  C)  Pulse:  [89-97] 90  Resp:  [16-18] 16  BP: (120-135)/(84-91) 130/85  SpO2:  [96 %-99 %] 97 %    Intake/Output Summary (Last 24 hours) at 1/29/2024 0829  Last data filed at 1/28/2024 1800  Gross per 24 hour   Intake 1240 ml   Output --   Net 1240 ml       GENERAL:  Awake, alert, no acute distress  EXTREMITIES: Warm and well perfused, no edema   ABDOMEN:  Soft, non tender, non-distended. No guarding, rigidity, or peritoneal signs.      LABS:  Lab Results   Component Value Date    WBC 10.0 01/28/2024    WBC 14.0 07/04/2021     Lab Results   Component Value Date    HGB 9.8 01/28/2024    HGB 14.6 07/04/2021     Lab Results   Component Value Date    HCT 30.2 01/28/2024    HCT 45.0 07/04/2021     Lab Results   Component Value Date     01/28/2024     07/04/2021     Last Basic Metabolic Panel:  Lab Results   Component Value Date     01/28/2024     07/04/2021      Lab Results   Component Value Date    POTASSIUM 3.5 01/28/2024    POTASSIUM 4.0 07/29/2022    POTASSIUM 3.9 07/04/2021     Lab Results   Component Value Date    CHLORIDE 101 01/28/2024    CHLORIDE 104 07/29/2022    CHLORIDE 104 07/04/2021     Lab Results   Component Value Date    JOSÉ 9.1 01/28/2024    JOSÉ 9.0 07/04/2021     Lab Results   Component Value Date    CO2 26 01/28/2024    CO2 25 07/29/2022    CO2 30 07/04/2021     Lab Results   Component Value Date    BUN 3.1 01/28/2024    BUN 11 07/29/2022    BUN 8 07/04/2021     Lab Results   Component Value Date    CR 0.54 01/28/2024    CR 0.72 07/04/2021     Lab Results   Component Value Date     01/28/2024    GLC 95 07/29/2022     07/04/2021       ASSESSMENT/PLAN: Misty Serrano is a 28 year old woman who presented with recurrent,  acute diverticulitis with possible fistulous connection to the left ovary.    Low fiber diet.  Discussed she can liberalize to a regular diet as tolerated as her abdominal pain improves.  Tolerating oral antibiotics without increased pain.  Would plan for a total 14-day course of antibiotics given the possible fistulous connection to the left ovary.  Will plan for outpatient follow-up in approximately 2 weeks with a repeat CT scan of the abdomen and pelvis to assess intra-abdominal findings.  We discussed that she will likely require elective surgery in approximately 6 to 8 weeks pending CT findings given that this is a complicated episode of diverticulitis.  Last colonoscopy was in 2021, so she does not require endoscopic evaluation in the short-term.  Okay to discharge to home from a colorectal surgery standpoint.    For questions/paging, please contact the CRS office at [phone].    Namrata Evangelista MD  Colorectal Surgery    [facility]  [address] [place] [address]  T: [phone]  F: [phone]

## 2024-01-30 LAB
BACTERIA BLD CULT: NO GROWTH
BACTERIA BLD CULT: NO GROWTH

## 2024-01-31 ENCOUNTER — PATIENT OUTREACH (OUTPATIENT)
Dept: CARE COORDINATION | Facility: CLINIC | Age: 29
End: 2024-01-31
Payer: COMMERCIAL

## 2024-01-31 NOTE — PROGRESS NOTES
[facility] Resource Center:   [facility] Resource Center Contact  [facility]/Voicemail     Clinical Data: Post-Discharge Outreach     Outreach attempted x 2.  Left message on patient's voicemail, providing [facility]'s central phone number of [phone] ([phone]) for questions/concerns and/or to schedule an appt with an [facility] provider, if they do not have a PCP.      Plan:  [facility] will do no further outreaches at this time.       Loni Green  Community Health Worker  [facility]  [phone]      *Connected Care Resource Team does NOT follow patient ongoing. Referrals are identified based on internal discharge reports and the outreach is to ensure patient has an understanding of their discharge instructions.

## 2024-02-05 PROBLEM — R10.32 ABDOMINAL PAIN, LEFT LOWER QUADRANT: Status: RESOLVED | Noted: 2024-01-25 | Resolved: 2024-02-05

## 2024-02-05 PROBLEM — K57.92 DIVERTICULITIS: Status: RESOLVED | Noted: 2024-01-25 | Resolved: 2024-02-05

## 2024-02-06 ENCOUNTER — OFFICE VISIT (OUTPATIENT)
Dept: INTERNAL MEDICINE | Facility: CLINIC | Age: 29
End: 2024-02-06
Payer: COMMERCIAL

## 2024-02-06 VITALS
HEART RATE: 88 BPM | WEIGHT: 258.2 LBS | SYSTOLIC BLOOD PRESSURE: 124 MMHG | BODY MASS INDEX: 38.24 KG/M2 | OXYGEN SATURATION: 99 % | DIASTOLIC BLOOD PRESSURE: 80 MMHG | HEIGHT: 69 IN | RESPIRATION RATE: 16 BRPM

## 2024-02-06 DIAGNOSIS — K57.32 DIVERTICULITIS OF COLON: ICD-10-CM

## 2024-02-06 DIAGNOSIS — Z09 HOSPITAL DISCHARGE FOLLOW-UP: Primary | ICD-10-CM

## 2024-02-06 DIAGNOSIS — Z23 NEED FOR PROPHYLACTIC VACCINATION AND INOCULATION AGAINST INFLUENZA: ICD-10-CM

## 2024-02-06 PROCEDURE — 99495 TRANSJ CARE MGMT MOD F2F 14D: CPT | Mod: 25 | Performed by: INTERNAL MEDICINE

## 2024-02-06 PROCEDURE — 90471 IMMUNIZATION ADMIN: CPT | Performed by: INTERNAL MEDICINE

## 2024-02-06 PROCEDURE — 90686 IIV4 VACC NO PRSV 0.5 ML IM: CPT | Performed by: INTERNAL MEDICINE

## 2024-02-06 ASSESSMENT — PATIENT HEALTH QUESTIONNAIRE - PHQ9
SUM OF ALL RESPONSES TO PHQ QUESTIONS 1-9: 6
SUM OF ALL RESPONSES TO PHQ QUESTIONS 1-9: 6
10. IF YOU CHECKED OFF ANY PROBLEMS, HOW DIFFICULT HAVE THESE PROBLEMS MADE IT FOR YOU TO DO YOUR WORK, TAKE CARE OF THINGS AT HOME, OR GET ALONG WITH OTHER PEOPLE: NOT DIFFICULT AT ALL

## 2024-02-06 NOTE — PROGRESS NOTES
ASSESSMENT/PLAN      (Z09) Hospital discharge follow-up  (primary encounter diagnosis)  (K57.32) Diverticulitis of colon  Comment: Clinically doing well; follow-up CT and CRS evaluation scheduled.  Plan: continue and complete levofloxacin and Flagyl; slowly advance diet as tolerated; if diverticulitis symptoms recur, patient to contact MD.    (Z23) Need for prophylactic vaccination and inoculation against influenza  Comment: Flu shot given today.    Melody Echevarria MD   [address] [address]  T: [phone], F: [phone]    SUBJECTIVE     Misty Serrano is a very pleasant 28 year old female who presents for hospital follow-up:    Hospital: [facility]  Date of Admission: 1/25/2024  Date of Discharge: 1/29/2024  Reason(s) for Admission: Recurrent acute complicated diverticulitis    Diagnostic tests, treatments/interventions, and discharge summary reviewed.    Summary of hospitalization:  - presented with 1 month of worsening left lower quadrant abdominal pain and fever  - recently treated with Augmentin for diverticulitis  - CT showed interval worsening sigmoid colon diverticulitis with interval development of sinus tract extending into sigmoid mesenteric fat and left ovary  -CRS consulted and patient started on IV Zosyn  - discharged home on Levaquin and Flagyl to complete 2-week course of antibiotics    Medication changes since discharge: n/a   Adherent to discharge medications: yes  Problems taking discharge medications: no    Follow-up: CRS outpatient follow-up 2/20/2024 with preceding CT 2/14/2024; patient will likely require elective surgery in 6-8 weeks for definitive treatment    Update since discharge:   - doing well-no complaints  - tolerating low residue diet with slow increase of fiber intake over time  - no abdominal pain except first thing in the morning  - no nausea or vomiting  - no fevers or chills    OBJECTIVE      /80   Pulse 88   " Resp 16   Ht 1.753 m (5' 9\")   Wt 117.1 kg (258 lb 3.2 oz)   SpO2 99%   BMI 38.13 kg/m    Constitutional: well-appearing  Respiratory: normal respiratory effort; clear to auscultation bilaterally  Cardiovascular: regular rate and rhythm; no edema  Gastrointestinal: soft, very mild tenderness to palpation left lower quadrant, and non-distended; no organomegaly or masses   Musculoskeletal: normal gait and station  Psych: normal judgment and insight; normal mood and affect; recent and remote memory intact  ---  (Note was completed, in part, with [employer] voice-recognition software. Documentation was reviewed, but some grammatical, spelling, and word errors may remain.)  "

## 2024-02-14 ENCOUNTER — HOSPITAL ENCOUNTER (OUTPATIENT)
Dept: CT IMAGING | Facility: CLINIC | Age: 29
Discharge: HOME | End: 2024-02-14
Attending: COLON & RECTAL SURGERY | Admitting: COLON & RECTAL SURGERY
Payer: COMMERCIAL

## 2024-02-14 PROCEDURE — 250N000011 HC RX IP 250 OP 636: Performed by: COLON & RECTAL SURGERY

## 2024-02-14 PROCEDURE — 250N000009 HC RX 250: Performed by: COLON & RECTAL SURGERY

## 2024-02-14 PROCEDURE — 74177 CT ABD & PELVIS W/CONTRAST: CPT

## 2024-02-14 RX ORDER — IOPAMIDOL 755 MG/ML
126 INJECTION, SOLUTION INTRAVASCULAR ONCE
Status: COMPLETED | OUTPATIENT
Start: 2024-02-14 | End: 2024-02-14

## 2024-02-14 RX ADMIN — IOPAMIDOL 126 ML: 755 INJECTION, SOLUTION INTRAVENOUS at 10:31

## 2024-02-14 RX ADMIN — SODIUM CHLORIDE 76 ML: 9 INJECTION, SOLUTION INTRAVENOUS at 10:30

## 2024-02-20 ENCOUNTER — MEDICAL CORRESPONDENCE (OUTPATIENT)
Dept: HEALTH INFORMATION MANAGEMENT | Facility: CLINIC | Age: 29
End: 2024-02-20

## 2024-02-20 ENCOUNTER — TRANSFERRED RECORDS (OUTPATIENT)
Dept: HEALTH INFORMATION MANAGEMENT | Facility: CLINIC | Age: 29
End: 2024-02-20
Payer: COMMERCIAL

## 2024-03-17 ENCOUNTER — MYC REFILL (OUTPATIENT)
Dept: INTERNAL MEDICINE | Facility: CLINIC | Age: 29
End: 2024-03-17
Payer: COMMERCIAL

## 2024-03-17 DIAGNOSIS — R10.9 ABDOMINAL CRAMPING: ICD-10-CM

## 2024-03-17 ASSESSMENT — PATIENT HEALTH QUESTIONNAIRE - PHQ9
SUM OF ALL RESPONSES TO PHQ QUESTIONS 1-9: 9
SUM OF ALL RESPONSES TO PHQ QUESTIONS 1-9: 9
10. IF YOU CHECKED OFF ANY PROBLEMS, HOW DIFFICULT HAVE THESE PROBLEMS MADE IT FOR YOU TO DO YOUR WORK, TAKE CARE OF THINGS AT HOME, OR GET ALONG WITH OTHER PEOPLE: SOMEWHAT DIFFICULT

## 2024-03-18 ENCOUNTER — OFFICE VISIT (OUTPATIENT)
Dept: INTERNAL MEDICINE | Facility: CLINIC | Age: 29
End: 2024-03-18
Payer: COMMERCIAL

## 2024-03-18 VITALS
DIASTOLIC BLOOD PRESSURE: 84 MMHG | SYSTOLIC BLOOD PRESSURE: 128 MMHG | WEIGHT: 265.4 LBS | RESPIRATION RATE: 16 BRPM | HEIGHT: 69 IN | OXYGEN SATURATION: 98 % | HEART RATE: 77 BPM | BODY MASS INDEX: 39.31 KG/M2

## 2024-03-18 DIAGNOSIS — G47.33 OSA (OBSTRUCTIVE SLEEP APNEA): ICD-10-CM

## 2024-03-18 DIAGNOSIS — Z01.818 PREOP GENERAL PHYSICAL EXAM: Primary | ICD-10-CM

## 2024-03-18 DIAGNOSIS — I10 BENIGN ESSENTIAL HYPERTENSION: ICD-10-CM

## 2024-03-18 DIAGNOSIS — K57.30 DIVERTICULAR DISEASE OF COLON: ICD-10-CM

## 2024-03-18 LAB
ANION GAP SERPL CALCULATED.3IONS-SCNC: 11 MMOL/L (ref 7–15)
BASOPHILS # BLD AUTO: 0 10E3/UL (ref 0–0.2)
BASOPHILS NFR BLD AUTO: 0 %
BUN SERPL-MCNC: 12.4 MG/DL (ref 6–20)
CALCIUM SERPL-MCNC: 9.5 MG/DL (ref 8.6–10)
CHLORIDE SERPL-SCNC: 104 MMOL/L (ref 98–107)
CREAT SERPL-MCNC: 0.76 MG/DL (ref 0.51–0.95)
DEPRECATED HCO3 PLAS-SCNC: 24 MMOL/L (ref 22–29)
EGFRCR SERPLBLD CKD-EPI 2021: >90 ML/MIN/1.73M2
EOSINOPHIL # BLD AUTO: 0.3 10E3/UL (ref 0–0.7)
EOSINOPHIL NFR BLD AUTO: 3 %
ERYTHROCYTE [DISTWIDTH] IN BLOOD BY AUTOMATED COUNT: 15.3 % (ref 10–15)
GLUCOSE SERPL-MCNC: 105 MG/DL (ref 70–99)
HCT VFR BLD AUTO: 40.6 % (ref 35–47)
HGB BLD-MCNC: 12.8 G/DL (ref 11.7–15.7)
IMM GRANULOCYTES # BLD: 0 10E3/UL
IMM GRANULOCYTES NFR BLD: 0 %
LYMPHOCYTES # BLD AUTO: 2.2 10E3/UL (ref 0.8–5.3)
LYMPHOCYTES NFR BLD AUTO: 22 %
MCH RBC QN AUTO: 27.5 PG (ref 26.5–33)
MCHC RBC AUTO-ENTMCNC: 31.5 G/DL (ref 31.5–36.5)
MCV RBC AUTO: 87 FL (ref 78–100)
MONOCYTES # BLD AUTO: 0.5 10E3/UL (ref 0–1.3)
MONOCYTES NFR BLD AUTO: 5 %
NEUTROPHILS # BLD AUTO: 6.7 10E3/UL (ref 1.6–8.3)
NEUTROPHILS NFR BLD AUTO: 69 %
PLATELET # BLD AUTO: 293 10E3/UL (ref 150–450)
POTASSIUM SERPL-SCNC: 4.6 MMOL/L (ref 3.4–5.3)
RBC # BLD AUTO: 4.66 10E6/UL (ref 3.8–5.2)
SODIUM SERPL-SCNC: 139 MMOL/L (ref 135–145)
WBC # BLD AUTO: 9.7 10E3/UL (ref 4–11)

## 2024-03-18 PROCEDURE — 80048 BASIC METABOLIC PNL TOTAL CA: CPT | Performed by: PHYSICIAN ASSISTANT

## 2024-03-18 PROCEDURE — 36415 COLL VENOUS BLD VENIPUNCTURE: CPT | Performed by: PHYSICIAN ASSISTANT

## 2024-03-18 PROCEDURE — 85025 COMPLETE CBC W/AUTO DIFF WBC: CPT | Performed by: PHYSICIAN ASSISTANT

## 2024-03-18 PROCEDURE — 99214 OFFICE O/P EST MOD 30 MIN: CPT | Performed by: PHYSICIAN ASSISTANT

## 2024-03-18 RX ORDER — HYOSCYAMINE SULFATE 0.125 MG
TABLET ORAL
Qty: 30 TABLET | Refills: 1 | Status: SHIPPED | OUTPATIENT
Start: 2024-03-18 | End: 2024-04-10

## 2024-03-18 NOTE — PATIENT INSTRUCTIONS
Preparing for Your Surgery  Getting started  A nurse will call you to review your health history and instructions. They will give you an arrival time based on your scheduled surgery time. Please be ready to share:  Your doctor's clinic name and phone number  Your medical, surgical, and anesthesia history  A list of allergies and sensitivities  A list of medicines, including herbal treatments and over-the-counter drugs  Whether the patient has a legal guardian (ask how to send us the papers in advance)  Please tell us if you're pregnant--or if there's any chance you might be pregnant. Some surgeries may injure a fetus (unborn baby), so they require a pregnancy test. Surgeries that are safe for a fetus don't always need a test, and you can choose whether to have one.   If you have a child who's having surgery, please ask for a copy of Preparing for Your Child's Surgery.    Preparing for surgery  Within 10 to 30 days of surgery: Have a pre-op exam (sometimes called an H&P, or History and Physical). This can be done at a clinic or pre-operative center.  If you're having a , you may not need this exam. Talk to your care team.  At your pre-op exam, talk to your care team about all medicines you take. If you need to stop any medicines before surgery, ask when to start taking them again.  We do this for your safety. Many medicines can make you bleed too much during surgery. Some change how well surgery (anesthesia) drugs work.  Call your insurance company to let them know you're having surgery. (If you don't have insurance, call [phone].)  Call your clinic if there's any change in your health. This includes signs of a cold or flu (sore throat, runny nose, cough, rash, fever). It also includes a scrape or scratch near the surgery site.  If you have questions on the day of surgery, call your hospital or surgery center.  Eating and drinking guidelines  For your safety: Unless your surgeon tells you otherwise,  follow the guidelines below.  Eat and drink as usual until 8 hours before you arrive for surgery. After that, no food or milk.  Drink clear liquids until 2 hours before you arrive. These are liquids you can see through, like water, Gatorade, and Propel Water. They also include plain black coffee and tea (no cream or milk), candy, and breath mints. You can spit out gum when you arrive.  If you drink alcohol: Stop drinking it the night before surgery.  If your care team tells you to take medicine on the morning of surgery, it's okay to take it with a sip of water.  Preventing infection  Shower or bathe the night before and morning of your surgery. Follow the instructions your clinic gave you. (If no instructions, use regular soap.)  Don't shave or clip hair near your surgery site. We'll remove the hair if needed.  Don't smoke or vape the morning of surgery. You may chew nicotine gum up to 2 hours before surgery. A nicotine patch is okay.  Note: Some surgeries require you to completely quit smoking and nicotine. Check with your surgeon.  Your care team will make every effort to keep you safe from infection. We will:  Clean our hands often with soap and water (or an alcohol-based hand rub).  Clean the skin at your surgery site with a special soap that kills germs.  Give you a special gown to keep you warm. (Cold raises the risk of infection.)  Wear special hair covers, masks, gowns and gloves during surgery.  Give antibiotic medicine, if prescribed. Not all surgeries need antibiotics.  What to bring on the day of surgery  Photo ID and insurance card  Copy of your health care directive, if you have one  Glasses and hearing aids (bring cases)  You can't wear contacts during surgery  Inhaler and eye drops, if you use them (tell us about these when you arrive)  CPAP machine or breathing device, if you use them  A few personal items, if spending the night  If you have . . .  A pacemaker, ICD (cardiac defibrillator) or other  implant: Bring the ID card.  An implanted stimulator: Bring the remote control.  A legal guardian: Bring a copy of the certified (court-stamped) guardianship papers.  Please remove any jewelry, including body piercings. Leave jewelry and other valuables at home.  If you're going home the day of surgery  You must have a responsible adult drive you home. They should stay with you overnight as well.  If you don't have someone to stay with you, and you aren't safe to go home alone, we may keep you overnight. Insurance often won't pay for this.  After surgery  If it's hard to control your pain or you need more pain medicine, please call your surgeon's office.  Questions?   If you have any questions for your care team, list them here: _________________________________________________________________________________________________________________________________________________________________________ ____________________________________ ____________________________________ ____________________________________  For informational purposes only. Not to replace the advice of your health care provider. Copyright   2003, 2019 [employer] [facility]. All rights reserved. Clinically reviewed by Jina Guaman MD. [employer] 771999 - REV 12/22.    How to Take Your Medication Before Surgery  - Take all of your medications before surgery as usual  - Take all of your medications before surgery except as noted below  - STOP taking all vitamins and herbal supplements 14 days before surgery.  No anti inflammatory meds - ibuprofen aleve

## 2024-03-18 NOTE — PROGRESS NOTES
Preoperative Evaluation  [address] [address]  Phone: [phone]  Primary Provider: No Ref-Primary, Physician  Pre-op Performing Provider: ERICK RICO 18, 2024       Suki is a 29 year old, presenting for the following:  Pre-Op Exam      Surgical Information  Surgery/Procedure: Robot-Assisted Sigmoid Colectomy   Surgery Location: [facility]  Surgeon: Dr Evangelista  Surgery Date: 3/27/2024  Time of Surgery: 12:00pm  Where patient plans to recover: At home with family  Fax number for surgical facility: Note does not need to be faxed, will be available electronically in Epic.    Assessment & Plan     The proposed surgical procedure is considered INTERMEDIATE risk.    Preop general physical exam    - CBC with platelets and differential; Future  - Basic metabolic panel  (Ca, Cl, CO2, Creat, Gluc, K, Na, BUN); Future    Diverticular disease of colon    - CBC with platelets and differential; Future  - Basic metabolic panel  (Ca, Cl, CO2, Creat, Gluc, K, Na, BUN); Future    Benign essential hypertension    - CBC with platelets and differential; Future  - Basic metabolic panel  (Ca, Cl, CO2, Creat, Gluc, K, Na, BUN); Future           Risks and Recommendations  The patient has the following additional risks and recommendations for perioperative complications:   - Morbid obesity (BMI >40)    Antiplatelet or Anticoagulation Medication Instructions   - Patient is on no antiplatelet or anticoagulation medications.    Additional Medication Instructions  Patient is to take all scheduled medications on the day of surgery EXCEPT for modifications listed below:  Stop all NSAID one week prior    - ACE/ARB: Continue without modification (e.g., MAC anesthesia, neurosurgery, spine surgery, heart failure, or labile hypertension with risk of hypertension).   - Calcium Channel Blockers: May be continued on the day of surgery.   - Herbal medications  and vitamins: HOLD 14 days prior to surgery.    Recommendation  APPROVAL GIVEN to proceed with proposed procedure, without further diagnostic evaluation.          Subjective       Via the Health Maintenance questionnaire, the patient has reported the following services have been completed -Cervical Cancer Screening, this information has been sent to the abstraction team.  HPI related to upcoming procedure: diverticulitis        3/13/2024    12:37 PM   Preop Questions   1. Have you ever had a heart attack or stroke? No   2. Have you ever had surgery on your heart or blood vessels, such as a stent placement, a coronary artery bypass, or surgery on an artery in your head, neck, heart, or legs? No   3. Do you have chest pain with activity? No   4. Do you have a history of  heart failure? No   5. Do you currently have a cold, bronchitis or symptoms of other infection? No   6. Do you have a cough, shortness of breath, or wheezing? No   7. Do you or anyone in your family have previous history of blood clots? No   8. Do you or does anyone in your family have a serious bleeding problem such as prolonged bleeding following surgeries or cuts? No   9. Have you ever had problems with anemia or been told to take iron pills? No   10. Have you had any abnormal blood loss such as black, tarry or bloody stools, or abnormal vaginal bleeding? No   11. Have you ever had a blood transfusion? No   12. Are you willing to have a blood transfusion if it is medically needed before, during, or after your surgery? Yes   13. Have you or any of your relatives ever had problems with anesthesia? No   14. Do you have sleep apnea, excessive snoring or daytime drowsiness? YES -   Snoring  Sleep apnea   14a. Do you have a CPAP machine? No   15. Do you have any artifical heart valves or other implanted medical devices like a pacemaker, defibrillator, or continuous glucose monitor? No   16. Do you have artificial joints? No   17. Are you allergic to  latex? No   18. Is there any chance that you may be pregnant? No       Health Care Directive  Patient does not have a Health Care Directive or Living Will:     Preoperative Review of    reviewed - no record of controlled substances prescribed.      Status of Chronic Conditions:  See problem list for active medical problems.  Problems all longstanding and stable, except as noted/documented.  See ROS for pertinent symptoms related to these conditions.    HYPERTENSION - Patient has longstanding history of HTN , currently denies any symptoms referable to elevated blood pressure. Specifically denies chest pain, palpitations, dyspnea, orthopnea, PND or peripheral edema. Blood pressure readings have been in normal range. Current medication regimen is as listed below. Patient denies any side effects of medication.     SLEEP PROBLEM - Patient has a longstanding history of sleep apnea .    Patient Active Problem List    Diagnosis Date Noted    BRENDEN (obstructive sleep apnea) 08/15/2023     Priority: Medium     8/14/2023 [place] [facility] Sleep Study (278.0 lbs) - AHI 49.6, RDI 90.4, Supine AHI 65.5, REM AHI 43.6, Low O2 81.0%, Time Spent ?88% 20.2 minutes / Time Spent ?89% 32.0 minutes.      Impaired fasting glucose 07/14/2023     Priority: Medium    Benign essential hypertension 07/13/2023     Priority: Medium    History of diverticulitis 07/11/2022     Priority: Medium     recurrent      Persistent depressive disorder      Priority: Medium    FRANCES (generalized anxiety disorder)      Priority: Medium    Morbid obesity (H) 01/17/2020     Priority: Medium      Past Medical History:   Diagnosis Date    Benign essential hypertension     FRANCES (generalized anxiety disorder)     History of diverticulitis     recurrent    Impaired fasting glucose     Morbid obesity (H)     Persistent depressive disorder      Past Surgical History:   Procedure Laterality Date    COLONOSCOPY N/A 8/13/2021    Procedure: Colonoscopy, With  "Polypectomy And Biopsy;  Surgeon: Tejal Jarrett DO;  Location: [facility] OR    COLONOSCOPY WITH CO2 INSUFFLATION N/A 8/13/2021    Procedure: COLONOSCOPY, WITH CO2 INSUFFLATION;  Surgeon: Tejal Jarrett DO;  Location: [facility] OR    NO HISTORY OF SURGERY       Current Outpatient Medications   Medication Sig Dispense Refill    acetaminophen (TYLENOL) 325 MG tablet Take 325-650 mg by mouth every 4 hours as needed for mild pain      amLODIPine-valsartan (EXFORGE) 5-160 MG tablet Take 1 tablet by mouth daily 90 tablet 3    Etonogestrel (NEXPLANON SC)       hyoscyamine (LEVSIN) 0.125 MG tablet TAKE 1 TABLET(125 MCG) BY MOUTH EVERY 6 HOURS AS NEEDED FOR CRAMPING 30 tablet 1    lactobacillus rhamnosus, GG, (CULTURELL) capsule Take 1 capsule by mouth daily         No Known Allergies     Social History     Tobacco Use    Smoking status: Never    Smokeless tobacco: Never    Tobacco comments:     No tobacco use, tried vaping a couple of times in early 20s but have not used for several years.   Substance Use Topics    Alcohol use: Not Currently     Comment: Probably drink once a month, if at all.       History   Drug Use Unknown         Review of Systems    Review of Systems  Constitutional, HEENT, cardiovascular, pulmonary, gi and gu systems are negative, except as otherwise noted.    Objective    /84   Pulse 77   Resp 16   Ht 1.753 m (5' 9\")   Wt 120.4 kg (265 lb 6.4 oz)   SpO2 98%   BMI 39.19 kg/m     Estimated body mass index is 39.19 kg/m  as calculated from the following:    Height as of this encounter: 1.753 m (5' 9\").    Weight as of this encounter: 120.4 kg (265 lb 6.4 oz).  Physical Exam  GENERAL: alert and no distress  HENT: normal cephalic/atraumatic, ear canals and TM's normal, oropharynx clear, and oral mucous membranes moist  NECK: no adenopathy, no asymmetry, masses, or scars  RESP: lungs clear to auscultation - no rales, rhonchi or wheezes  CV: regular rates and rhythm, normal S1 S2, no S3 or S4, and no " murmur, click or rub  ABDOMEN: soft, nontender, without hepatosplenomegaly or masses  MS: no gross musculoskeletal defects noted, no edema  SKIN: no suspicious lesions or rashes    Recent Labs   Lab Test 01/29/24  0839 01/28/24  1534 01/28/24  0815 01/27/24  1039 01/04/24 2006 07/14/23  1628 07/29/22  1203 07/12/22  1756   HGB  --   --  9.8* 9.6*   < >  --   --   --    PLT  --   --  426 403   < >  --   --   --    NA  --   --  138 137   < > 138   < > 135   POTASSIUM 3.2* 3.5 3.2* 3.4   < > 3.8   < > 4.0   CR  --   --  0.54 0.51   < > 0.61   < > 0.62   A1C  --   --   --   --   --  6.0*  --  5.9*    < > = values in this interval not displayed.        Diagnostics  Labs pending at this time.  Results will be reviewed when available.   No EKG required, no history of coronary heart disease, significant arrhythmia, peripheral arterial disease or other structural heart disease.    Revised Cardiac Risk Index (RCRI)  The patient has the following serious cardiovascular risks for perioperative complications:   - No serious cardiac risks = 0 points     RCRI Interpretation: 0 points: Class I (very low risk - 0.4% complication rate)         Signed Electronically by: Madina Vora PA-C  Copy of this evaluation report is provided to requesting physician.

## 2024-03-26 ENCOUNTER — NURSE TRIAGE (OUTPATIENT)
Dept: NURSING | Facility: CLINIC | Age: 29
End: 2024-03-26
Payer: COMMERCIAL

## 2024-03-26 RX ORDER — FEXOFENADINE HCL 180 MG/1
180 TABLET ORAL DAILY PRN
COMMUNITY

## 2024-03-26 NOTE — TELEPHONE ENCOUNTER
"    Nurse Triage SBAR    Is this a 2nd Level Triage? NO    Situation: Vomited x1 after bowel prep for colonoscopy and vomited again after colonoscopy.    Background: Was told to do another prep today to prepare for Sigmoid Colectomy tomorrow. HX Diverticulitis    Assessment: Has 8 oz left of evening prep and feels a little nauseated now. Intermittent cramps she rates \"1-2\"   She has taken antiemetics     Protocol Recommended Disposition:   No disposition on file.    Recommendation: Advised to continue to take small sips and taking breaks if she becomes nauseated.   Call back if symptoms worsen    Does the patient meet one of the following criteria for ADS visit consideration? 16+ years old, with an [facility] PCP   Lis Gray RN on 3/26/2024 at 7:19 PM    TIP  Providers, please consider if this condition is appropriate for management at one of our Acute and Diagnostic Services sites.     If patient is a good candidate, please use dotphrase <dot>triageresponse and select Refer to ADS to document.  Reason for Disposition   [1] MILD-MODERATE abdomen pain (e.g., does not interfere with normal activities) AND [2] pain comes and goes (cramps) [3] lasting > 48 hours    Additional Information   Negative: Shock suspected (e.g., cold/pale/clammy skin, too weak to stand, low BP, rapid pulse)   Negative: Difficult to awaken or acting confused (e.g., disoriented, slurred speech)   Negative: Passed out (i.e., lost consciousness, collapsed and was not responding)   Negative: Sounds like a life-threatening emergency to the triager   Negative: SEVERE abdomen pain (e.g., excruciating)   Negative: SEVERE rectal bleeding (large blood clots; on and off, or constant bleeding)   Negative: SEVERE dizziness (e.g., unable to stand, requires support to walk, feels like passing out now)   Negative: SEVERE vomiting (e.g., 6 or more times/day)   Negative: [1] MODERATE rectal bleeding (small blood clots, passing blood without stool, or toilet " water turns red) AND [2] more than once   Negative: [1] MILD-MODERATE abdomen pain AND [2] constant AND [3] present > 2 hours   Negative: [1] Drinking very little AND [2] dehydration suspected (e.g., no urine > 12 hours, very dry mouth, very lightheaded)   Negative: Patient sounds very sick or weak to the triager   Negative: Fever > 100.4 F (38.0 C)   Negative: [1] Abdominal bloating, cramping, nausea, or vomiting while drinking bowel prep AND [2] CANNOT finish bowel prep AND [3] has tried recommended Care Advice   Negative: [1] Caller has URGENT question or concern AND [2] triager unable to answer question    Protocols used: Colonoscopy Symptoms and [id number]

## 2024-03-26 NOTE — PROGRESS NOTES
PTA medications updated by Medication Scribe prior to surgery via phone call with patient (last doses completed by Nurse)     Medication history sources: Patient, Surescripts, and H&P  In the past week, patient estimated taking medication this percent of the time: Greater than 90%      Significant changes made to the medication list:  None      Additional medication history information:   Will complete antibiotic course: 3/26 PM  Metronidazole 500 mg po Three times day before surgery; at 1pm, 2pm, and 11pm    Medication reconciliation completed by provider prior to medication history? No    Time spent in this activity: 25 MINUTES    The information provided in this note is only as accurate as the sources available at the time of update(s)      Prior to Admission medications    Medication Sig Last Dose Taking? Auth Provider Long Term End Date   acetaminophen (TYLENOL) 325 MG tablet Take 2 tablets by mouth 3 times daily as needed for mild pain  at AM Yes Unknown, Entered By History     amLODIPine-valsartan (EXFORGE) 5-160 MG tablet Take 1 tablet by mouth daily  at AM Yes Melody Echevarria MD Yes    etonogestrel (NEXPLANON) 68 MG IMPL   Yes Reported, Patient Yes    fexofenadine (ALLEGRA) 180 MG tablet Take 180 mg by mouth daily as needed (SEASONAL ALLERGIES)  at PRN Yes Reported, Patient     hyoscyamine (LEVSIN) 0.125 MG tablet TAKE 1 TABLET(125 MCG) BY MOUTH EVERY 6 HOURS AS NEEDED FOR CRAMPING  at AM Yes Suresh Hicks MD     lactobacillus rhamnosus, GG, (CULTURELL) capsule Take 1 capsule by mouth daily as needed  at PRN Yes Unknown, Entered By History         Medication history completed by: Bella Alaniz

## 2024-03-27 ENCOUNTER — ANESTHESIA (OUTPATIENT)
Dept: SURGERY | Facility: CLINIC | Age: 29
DRG: 329 | End: 2024-03-27
Payer: COMMERCIAL

## 2024-03-27 ENCOUNTER — ANESTHESIA EVENT (OUTPATIENT)
Dept: SURGERY | Facility: CLINIC | Age: 29
DRG: 329 | End: 2024-03-27
Payer: COMMERCIAL

## 2024-03-27 ENCOUNTER — HOSPITAL ENCOUNTER (INPATIENT)
Facility: CLINIC | Age: 29
LOS: 3 days | Discharge: HOME | DRG: 329 | End: 2024-03-30
Attending: COLON & RECTAL SURGERY | Admitting: COLON & RECTAL SURGERY
Payer: COMMERCIAL

## 2024-03-27 DIAGNOSIS — K57.92 DIVERTICULITIS: Primary | ICD-10-CM

## 2024-03-27 LAB
ABO/RH(D): NORMAL
ALBUMIN SERPL BCG-MCNC: 4.5 G/DL (ref 3.5–5.2)
ANION GAP SERPL CALCULATED.3IONS-SCNC: 15 MMOL/L (ref 7–15)
ANTIBODY SCREEN: NEGATIVE
BUN SERPL-MCNC: 8.6 MG/DL (ref 6–20)
CALCIUM SERPL-MCNC: 9.8 MG/DL (ref 8.6–10)
CHLORIDE SERPL-SCNC: 100 MMOL/L (ref 98–107)
CREAT SERPL-MCNC: 0.66 MG/DL (ref 0.51–0.95)
DEPRECATED HCO3 PLAS-SCNC: 23 MMOL/L (ref 22–29)
EGFRCR SERPLBLD CKD-EPI 2021: >90 ML/MIN/1.73M2
ERYTHROCYTE [DISTWIDTH] IN BLOOD BY AUTOMATED COUNT: 14.9 % (ref 10–15)
GLUCOSE SERPL-MCNC: 122 MG/DL (ref 70–99)
HCG UR QL: NEGATIVE
HCT VFR BLD AUTO: 41.4 % (ref 35–47)
HGB BLD-MCNC: 13.5 G/DL (ref 11.7–15.7)
MCH RBC QN AUTO: 27.6 PG (ref 26.5–33)
MCHC RBC AUTO-ENTMCNC: 32.6 G/DL (ref 31.5–36.5)
MCV RBC AUTO: 85 FL (ref 78–100)
PLATELET # BLD AUTO: 353 10E3/UL (ref 150–450)
POTASSIUM SERPL-SCNC: 3.5 MMOL/L (ref 3.4–5.3)
RBC # BLD AUTO: 4.89 10E6/UL (ref 3.8–5.2)
SODIUM SERPL-SCNC: 138 MMOL/L (ref 135–145)
SPECIMEN EXPIRATION DATE: NORMAL
WBC # BLD AUTO: 11.4 10E3/UL (ref 4–11)

## 2024-03-27 PROCEDURE — 81025 URINE PREGNANCY TEST: CPT | Performed by: ANESTHESIOLOGY

## 2024-03-27 PROCEDURE — 250N000011 HC RX IP 250 OP 636

## 2024-03-27 PROCEDURE — 86900 BLOOD TYPING SEROLOGIC ABO: CPT | Performed by: COLON & RECTAL SURGERY

## 2024-03-27 PROCEDURE — 250N000013 HC RX MED GY IP 250 OP 250 PS 637: Performed by: COLON & RECTAL SURGERY

## 2024-03-27 PROCEDURE — 250N000009 HC RX 250: Performed by: COLON & RECTAL SURGERY

## 2024-03-27 PROCEDURE — P9045 ALBUMIN (HUMAN), 5%, 250 ML: HCPCS | Mod: JZ | Performed by: NURSE ANESTHETIST, CERTIFIED REGISTERED

## 2024-03-27 PROCEDURE — 44204 LAPARO PARTIAL COLECTOMY: CPT | Performed by: NURSE ANESTHETIST, CERTIFIED REGISTERED

## 2024-03-27 PROCEDURE — 710N000009 HC RECOVERY PHASE 1, LEVEL 1, PER MIN: Performed by: COLON & RECTAL SURGERY

## 2024-03-27 PROCEDURE — 258N000003 HC RX IP 258 OP 636: Performed by: NURSE ANESTHETIST, CERTIFIED REGISTERED

## 2024-03-27 PROCEDURE — 250N000009 HC RX 250: Performed by: NURSE ANESTHETIST, CERTIFIED REGISTERED

## 2024-03-27 PROCEDURE — 0DTN0ZZ RESECTION OF SIGMOID COLON, OPEN APPROACH: ICD-10-PCS | Performed by: COLON & RECTAL SURGERY

## 2024-03-27 PROCEDURE — 85027 COMPLETE CBC AUTOMATED: CPT | Performed by: COLON & RECTAL SURGERY

## 2024-03-27 PROCEDURE — 88307 TISSUE EXAM BY PATHOLOGIST: CPT | Mod: TC | Performed by: COLON & RECTAL SURGERY

## 2024-03-27 PROCEDURE — 250N000011 HC RX IP 250 OP 636: Performed by: COLON & RECTAL SURGERY

## 2024-03-27 PROCEDURE — 360N000080 HC SURGERY LEVEL 7, PER MIN: Performed by: COLON & RECTAL SURGERY

## 2024-03-27 PROCEDURE — 999N000141 HC STATISTIC PRE-PROCEDURE NURSING ASSESSMENT: Performed by: COLON & RECTAL SURGERY

## 2024-03-27 PROCEDURE — 250N000011 HC RX IP 250 OP 636: Performed by: NURSE ANESTHETIST, CERTIFIED REGISTERED

## 2024-03-27 PROCEDURE — 250N000025 HC SEVOFLURANE, PER MIN: Performed by: COLON & RECTAL SURGERY

## 2024-03-27 PROCEDURE — 44204 LAPARO PARTIAL COLECTOMY: CPT | Performed by: ANESTHESIOLOGY

## 2024-03-27 PROCEDURE — 258N000003 HC RX IP 258 OP 636: Performed by: COLON & RECTAL SURGERY

## 2024-03-27 PROCEDURE — 80048 BASIC METABOLIC PNL TOTAL CA: CPT | Performed by: COLON & RECTAL SURGERY

## 2024-03-27 PROCEDURE — 88307 TISSUE EXAM BY PATHOLOGIST: CPT | Mod: 26 | Performed by: PATHOLOGY

## 2024-03-27 PROCEDURE — 0DJD8ZZ INSPECTION OF LOWER INTESTINAL TRACT, VIA NATURAL OR ARTIFICIAL OPENING ENDOSCOPIC: ICD-10-PCS | Performed by: COLON & RECTAL SURGERY

## 2024-03-27 PROCEDURE — 370N000017 HC ANESTHESIA TECHNICAL FEE, PER MIN: Performed by: COLON & RECTAL SURGERY

## 2024-03-27 PROCEDURE — 120N000001 HC R&B MED SURG/OB

## 2024-03-27 PROCEDURE — 272N000001 HC OR GENERAL SUPPLY STERILE: Performed by: COLON & RECTAL SURGERY

## 2024-03-27 PROCEDURE — 8E0W4CZ ROBOTIC ASSISTED PROCEDURE OF TRUNK REGION, PERCUTANEOUS ENDOSCOPIC APPROACH: ICD-10-PCS | Performed by: COLON & RECTAL SURGERY

## 2024-03-27 PROCEDURE — 36415 COLL VENOUS BLD VENIPUNCTURE: CPT | Performed by: COLON & RECTAL SURGERY

## 2024-03-27 PROCEDURE — 258N000003 HC RX IP 258 OP 636: Performed by: ANESTHESIOLOGY

## 2024-03-27 PROCEDURE — 250N000011 HC RX IP 250 OP 636: Performed by: ANESTHESIOLOGY

## 2024-03-27 PROCEDURE — 250N000009 HC RX 250

## 2024-03-27 PROCEDURE — 82040 ASSAY OF SERUM ALBUMIN: CPT | Performed by: COLON & RECTAL SURGERY

## 2024-03-27 RX ORDER — ONDANSETRON 4 MG/1
4 TABLET, ORALLY DISINTEGRATING ORAL EVERY 30 MIN PRN
Status: DISCONTINUED | OUTPATIENT
Start: 2024-03-27 | End: 2024-03-27 | Stop reason: HOSPADM

## 2024-03-27 RX ORDER — INDOCYANINE GREEN AND WATER 25 MG
KIT INJECTION PRN
Status: DISCONTINUED | OUTPATIENT
Start: 2024-03-27 | End: 2024-03-27

## 2024-03-27 RX ORDER — KETOROLAC TROMETHAMINE 15 MG/ML
15 INJECTION, SOLUTION INTRAMUSCULAR; INTRAVENOUS EVERY 6 HOURS PRN
Status: DISCONTINUED | OUTPATIENT
Start: 2024-03-27 | End: 2024-03-30 | Stop reason: HOSPADM

## 2024-03-27 RX ORDER — AMLODIPINE BESYLATE 5 MG/1
5 TABLET ORAL DAILY
Status: DISCONTINUED | OUTPATIENT
Start: 2024-03-28 | End: 2024-03-30 | Stop reason: HOSPADM

## 2024-03-27 RX ORDER — CEFAZOLIN SODIUM/WATER 3 G/30 ML
3 SYRINGE (ML) INTRAVENOUS
Status: DISCONTINUED | OUTPATIENT
Start: 2024-03-27 | End: 2024-03-27 | Stop reason: HOSPADM

## 2024-03-27 RX ORDER — HYDROMORPHONE HCL IN WATER/PF 6 MG/30 ML
0.2 PATIENT CONTROLLED ANALGESIA SYRINGE INTRAVENOUS EVERY 5 MIN PRN
Status: DISCONTINUED | OUTPATIENT
Start: 2024-03-27 | End: 2024-03-27 | Stop reason: HOSPADM

## 2024-03-27 RX ORDER — BUPIVACAINE HYDROCHLORIDE 5 MG/ML
INJECTION, SOLUTION PERINEURAL PRN
Status: DISCONTINUED | OUTPATIENT
Start: 2024-03-27 | End: 2024-03-27 | Stop reason: HOSPADM

## 2024-03-27 RX ORDER — SODIUM CHLORIDE, SODIUM LACTATE, POTASSIUM CHLORIDE, CALCIUM CHLORIDE 600; 310; 30; 20 MG/100ML; MG/100ML; MG/100ML; MG/100ML
INJECTION, SOLUTION INTRAVENOUS CONTINUOUS
Status: DISCONTINUED | OUTPATIENT
Start: 2024-03-27 | End: 2024-03-28

## 2024-03-27 RX ORDER — PROPOFOL 10 MG/ML
INJECTION, EMULSION INTRAVENOUS PRN
Status: DISCONTINUED | OUTPATIENT
Start: 2024-03-27 | End: 2024-03-27

## 2024-03-27 RX ORDER — HYDROMORPHONE HCL IN WATER/PF 6 MG/30 ML
0.4 PATIENT CONTROLLED ANALGESIA SYRINGE INTRAVENOUS
Status: DISCONTINUED | OUTPATIENT
Start: 2024-03-27 | End: 2024-03-30 | Stop reason: HOSPADM

## 2024-03-27 RX ORDER — FENTANYL CITRATE 50 UG/ML
INJECTION, SOLUTION INTRAMUSCULAR; INTRAVENOUS PRN
Status: DISCONTINUED | OUTPATIENT
Start: 2024-03-27 | End: 2024-03-27

## 2024-03-27 RX ORDER — NALOXONE HYDROCHLORIDE 0.4 MG/ML
0.1 INJECTION, SOLUTION INTRAMUSCULAR; INTRAVENOUS; SUBCUTANEOUS
Status: DISCONTINUED | OUTPATIENT
Start: 2024-03-27 | End: 2024-03-27 | Stop reason: HOSPADM

## 2024-03-27 RX ORDER — CEFAZOLIN SODIUM/WATER 3 G/30 ML
3 SYRINGE (ML) INTRAVENOUS
Status: COMPLETED | OUTPATIENT
Start: 2024-03-27 | End: 2024-03-27

## 2024-03-27 RX ORDER — ONDANSETRON 2 MG/ML
4 INJECTION INTRAMUSCULAR; INTRAVENOUS EVERY 30 MIN PRN
Status: DISCONTINUED | OUTPATIENT
Start: 2024-03-27 | End: 2024-03-27 | Stop reason: HOSPADM

## 2024-03-27 RX ORDER — SODIUM CHLORIDE, SODIUM LACTATE, POTASSIUM CHLORIDE, CALCIUM CHLORIDE 600; 310; 30; 20 MG/100ML; MG/100ML; MG/100ML; MG/100ML
INJECTION, SOLUTION INTRAVENOUS CONTINUOUS
Status: DISCONTINUED | OUTPATIENT
Start: 2024-03-27 | End: 2024-03-27 | Stop reason: HOSPADM

## 2024-03-27 RX ORDER — HYDROMORPHONE HCL IN WATER/PF 6 MG/30 ML
0.2 PATIENT CONTROLLED ANALGESIA SYRINGE INTRAVENOUS
Status: DISCONTINUED | OUTPATIENT
Start: 2024-03-27 | End: 2024-03-30 | Stop reason: HOSPADM

## 2024-03-27 RX ORDER — LIDOCAINE HYDROCHLORIDE 20 MG/ML
INJECTION, SOLUTION INFILTRATION; PERINEURAL PRN
Status: DISCONTINUED | OUTPATIENT
Start: 2024-03-27 | End: 2024-03-27

## 2024-03-27 RX ORDER — HYDROMORPHONE HYDROCHLORIDE 2 MG/1
4 TABLET ORAL EVERY 4 HOURS PRN
Status: DISCONTINUED | OUTPATIENT
Start: 2024-03-27 | End: 2024-03-30 | Stop reason: HOSPADM

## 2024-03-27 RX ORDER — NALOXONE HYDROCHLORIDE 0.4 MG/ML
0.4 INJECTION, SOLUTION INTRAMUSCULAR; INTRAVENOUS; SUBCUTANEOUS
Status: DISCONTINUED | OUTPATIENT
Start: 2024-03-27 | End: 2024-03-30 | Stop reason: HOSPADM

## 2024-03-27 RX ORDER — NALOXONE HYDROCHLORIDE 0.4 MG/ML
0.2 INJECTION, SOLUTION INTRAMUSCULAR; INTRAVENOUS; SUBCUTANEOUS
Status: DISCONTINUED | OUTPATIENT
Start: 2024-03-27 | End: 2024-03-30 | Stop reason: HOSPADM

## 2024-03-27 RX ORDER — FENTANYL CITRATE 50 UG/ML
25 INJECTION, SOLUTION INTRAMUSCULAR; INTRAVENOUS EVERY 5 MIN PRN
Status: DISCONTINUED | OUTPATIENT
Start: 2024-03-27 | End: 2024-03-27 | Stop reason: HOSPADM

## 2024-03-27 RX ORDER — SODIUM CHLORIDE, SODIUM LACTATE, POTASSIUM CHLORIDE, CALCIUM CHLORIDE 600; 310; 30; 20 MG/100ML; MG/100ML; MG/100ML; MG/100ML
INJECTION, SOLUTION INTRAVENOUS CONTINUOUS PRN
Status: DISCONTINUED | OUTPATIENT
Start: 2024-03-27 | End: 2024-03-27

## 2024-03-27 RX ORDER — FEXOFENADINE HCL 180 MG/1
180 TABLET ORAL DAILY PRN
Status: DISCONTINUED | OUTPATIENT
Start: 2024-03-27 | End: 2024-03-30 | Stop reason: HOSPADM

## 2024-03-27 RX ORDER — HEPARIN SODIUM 5000 [USP'U]/.5ML
5000 INJECTION, SOLUTION INTRAVENOUS; SUBCUTANEOUS EVERY 8 HOURS
Status: DISCONTINUED | OUTPATIENT
Start: 2024-03-28 | End: 2024-03-30 | Stop reason: HOSPADM

## 2024-03-27 RX ORDER — LORAZEPAM 2 MG/ML
0.5 INJECTION INTRAMUSCULAR EVERY 4 HOURS PRN
Status: DISCONTINUED | OUTPATIENT
Start: 2024-03-27 | End: 2024-03-30 | Stop reason: HOSPADM

## 2024-03-27 RX ORDER — ONDANSETRON 4 MG/1
4 TABLET, ORALLY DISINTEGRATING ORAL EVERY 6 HOURS PRN
Status: DISCONTINUED | OUTPATIENT
Start: 2024-03-27 | End: 2024-03-30 | Stop reason: HOSPADM

## 2024-03-27 RX ORDER — MAGNESIUM HYDROXIDE 1200 MG/15ML
LIQUID ORAL PRN
Status: DISCONTINUED | OUTPATIENT
Start: 2024-03-27 | End: 2024-03-27 | Stop reason: HOSPADM

## 2024-03-27 RX ORDER — ONDANSETRON 2 MG/ML
INJECTION INTRAMUSCULAR; INTRAVENOUS PRN
Status: DISCONTINUED | OUTPATIENT
Start: 2024-03-27 | End: 2024-03-27

## 2024-03-27 RX ORDER — VALSARTAN 160 MG/1
160 TABLET ORAL DAILY
Status: DISCONTINUED | OUTPATIENT
Start: 2024-03-28 | End: 2024-03-30 | Stop reason: HOSPADM

## 2024-03-27 RX ORDER — HYDRALAZINE HYDROCHLORIDE 20 MG/ML
10 INJECTION INTRAMUSCULAR; INTRAVENOUS EVERY 4 HOURS PRN
Status: DISCONTINUED | OUTPATIENT
Start: 2024-03-27 | End: 2024-03-30 | Stop reason: HOSPADM

## 2024-03-27 RX ORDER — FENTANYL CITRATE 50 UG/ML
50 INJECTION, SOLUTION INTRAMUSCULAR; INTRAVENOUS EVERY 5 MIN PRN
Status: DISCONTINUED | OUTPATIENT
Start: 2024-03-27 | End: 2024-03-27 | Stop reason: HOSPADM

## 2024-03-27 RX ORDER — DEXAMETHASONE SODIUM PHOSPHATE 4 MG/ML
INJECTION, SOLUTION INTRA-ARTICULAR; INTRALESIONAL; INTRAMUSCULAR; INTRAVENOUS; SOFT TISSUE PRN
Status: DISCONTINUED | OUTPATIENT
Start: 2024-03-27 | End: 2024-03-27

## 2024-03-27 RX ORDER — ONDANSETRON 2 MG/ML
4 INJECTION INTRAMUSCULAR; INTRAVENOUS ONCE
Status: COMPLETED | OUTPATIENT
Start: 2024-03-27 | End: 2024-03-27

## 2024-03-27 RX ORDER — HYDROMORPHONE HCL IN WATER/PF 6 MG/30 ML
0.4 PATIENT CONTROLLED ANALGESIA SYRINGE INTRAVENOUS EVERY 5 MIN PRN
Status: DISCONTINUED | OUTPATIENT
Start: 2024-03-27 | End: 2024-03-27 | Stop reason: HOSPADM

## 2024-03-27 RX ORDER — ACETAMINOPHEN 325 MG/1
975 TABLET ORAL ONCE
Status: COMPLETED | OUTPATIENT
Start: 2024-03-27 | End: 2024-03-27

## 2024-03-27 RX ORDER — LIDOCAINE 40 MG/G
CREAM TOPICAL
Status: DISCONTINUED | OUTPATIENT
Start: 2024-03-27 | End: 2024-03-30 | Stop reason: HOSPADM

## 2024-03-27 RX ORDER — METRONIDAZOLE 500 MG/100ML
500 INJECTION, SOLUTION INTRAVENOUS
Status: DISCONTINUED | OUTPATIENT
Start: 2024-03-27 | End: 2024-03-27 | Stop reason: HOSPADM

## 2024-03-27 RX ORDER — HYOSCYAMINE SULFATE 0.125 MG
250 TABLET ORAL EVERY 4 HOURS PRN
Status: DISCONTINUED | OUTPATIENT
Start: 2024-03-27 | End: 2024-03-30 | Stop reason: HOSPADM

## 2024-03-27 RX ORDER — HYDROMORPHONE HYDROCHLORIDE 2 MG/1
2 TABLET ORAL EVERY 4 HOURS PRN
Status: DISCONTINUED | OUTPATIENT
Start: 2024-03-27 | End: 2024-03-30 | Stop reason: HOSPADM

## 2024-03-27 RX ORDER — ONDANSETRON 2 MG/ML
4 INJECTION INTRAMUSCULAR; INTRAVENOUS EVERY 6 HOURS PRN
Status: DISCONTINUED | OUTPATIENT
Start: 2024-03-27 | End: 2024-03-30 | Stop reason: HOSPADM

## 2024-03-27 RX ADMIN — ROCURONIUM BROMIDE 20 MG: 50 INJECTION, SOLUTION INTRAVENOUS at 13:04

## 2024-03-27 RX ADMIN — KETOROLAC TROMETHAMINE 15 MG: 15 INJECTION, SOLUTION INTRAMUSCULAR; INTRAVENOUS at 17:24

## 2024-03-27 RX ADMIN — HYOSCYAMINE SULFATE 250 MCG: 0.12 TABLET ORAL at 20:46

## 2024-03-27 RX ADMIN — ROCURONIUM BROMIDE 10 MG: 50 INJECTION, SOLUTION INTRAVENOUS at 14:45

## 2024-03-27 RX ADMIN — PHENYLEPHRINE HYDROCHLORIDE 150 MCG: 10 INJECTION INTRAVENOUS at 14:10

## 2024-03-27 RX ADMIN — HYDROMORPHONE HYDROCHLORIDE 0.4 MG: 0.2 INJECTION, SOLUTION INTRAMUSCULAR; INTRAVENOUS; SUBCUTANEOUS at 18:41

## 2024-03-27 RX ADMIN — PROPOFOL 200 MG: 10 INJECTION, EMULSION INTRAVENOUS at 11:09

## 2024-03-27 RX ADMIN — FENTANYL CITRATE 100 MCG: 50 INJECTION INTRAMUSCULAR; INTRAVENOUS at 11:09

## 2024-03-27 RX ADMIN — HYDROMORPHONE HYDROCHLORIDE 0.5 MG: 1 INJECTION, SOLUTION INTRAMUSCULAR; INTRAVENOUS; SUBCUTANEOUS at 16:29

## 2024-03-27 RX ADMIN — Medication 3 G: at 15:12

## 2024-03-27 RX ADMIN — MIDAZOLAM 2 MG: 1 INJECTION INTRAMUSCULAR; INTRAVENOUS at 11:05

## 2024-03-27 RX ADMIN — PHENYLEPHRINE HYDROCHLORIDE 100 MCG: 10 INJECTION INTRAVENOUS at 13:52

## 2024-03-27 RX ADMIN — KETOROLAC TROMETHAMINE 15 MG: 15 INJECTION, SOLUTION INTRAMUSCULAR; INTRAVENOUS at 23:50

## 2024-03-27 RX ADMIN — FENTANYL CITRATE 50 MCG: 50 INJECTION, SOLUTION INTRAMUSCULAR; INTRAVENOUS at 17:39

## 2024-03-27 RX ADMIN — PROPOFOL 50 MCG/KG/MIN: 10 INJECTION, EMULSION INTRAVENOUS at 11:18

## 2024-03-27 RX ADMIN — LIDOCAINE HYDROCHLORIDE 100 MG: 20 INJECTION, SOLUTION INFILTRATION; PERINEURAL at 11:09

## 2024-03-27 RX ADMIN — PHENYLEPHRINE HYDROCHLORIDE 0.5 MCG/KG/MIN: 10 INJECTION INTRAVENOUS at 14:28

## 2024-03-27 RX ADMIN — PHENYLEPHRINE HYDROCHLORIDE 100 MCG: 10 INJECTION INTRAVENOUS at 11:21

## 2024-03-27 RX ADMIN — ROCURONIUM BROMIDE 20 MG: 50 INJECTION, SOLUTION INTRAVENOUS at 15:01

## 2024-03-27 RX ADMIN — SODIUM CHLORIDE, POTASSIUM CHLORIDE, SODIUM LACTATE AND CALCIUM CHLORIDE: 600; 310; 30; 20 INJECTION, SOLUTION INTRAVENOUS at 13:46

## 2024-03-27 RX ADMIN — ONDANSETRON 4 MG: 2 INJECTION INTRAMUSCULAR; INTRAVENOUS at 16:03

## 2024-03-27 RX ADMIN — Medication 3 G: at 11:13

## 2024-03-27 RX ADMIN — SODIUM CHLORIDE, POTASSIUM CHLORIDE, SODIUM LACTATE AND CALCIUM CHLORIDE: 600; 310; 30; 20 INJECTION, SOLUTION INTRAVENOUS at 11:15

## 2024-03-27 RX ADMIN — DEXAMETHASONE SODIUM PHOSPHATE 4 MG: 4 INJECTION, SOLUTION INTRA-ARTICULAR; INTRALESIONAL; INTRAMUSCULAR; INTRAVENOUS; SOFT TISSUE at 11:36

## 2024-03-27 RX ADMIN — METRONIDAZOLE 500 MG: 500 INJECTION, SOLUTION INTRAVENOUS at 10:47

## 2024-03-27 RX ADMIN — PHENYLEPHRINE HYDROCHLORIDE 100 MCG: 10 INJECTION INTRAVENOUS at 11:30

## 2024-03-27 RX ADMIN — PROPOFOL 50 MG: 10 INJECTION, EMULSION INTRAVENOUS at 16:35

## 2024-03-27 RX ADMIN — INDOCYANINE GREEN AND WATER 10 MG: KIT at 13:53

## 2024-03-27 RX ADMIN — PHENYLEPHRINE HYDROCHLORIDE 100 MCG: 10 INJECTION INTRAVENOUS at 11:43

## 2024-03-27 RX ADMIN — ALBUMIN HUMAN: 0.05 INJECTION, SOLUTION INTRAVENOUS at 11:43

## 2024-03-27 RX ADMIN — PHENYLEPHRINE HYDROCHLORIDE 100 MCG: 10 INJECTION INTRAVENOUS at 14:28

## 2024-03-27 RX ADMIN — SODIUM CHLORIDE, POTASSIUM CHLORIDE, SODIUM LACTATE AND CALCIUM CHLORIDE: 600; 310; 30; 20 INJECTION, SOLUTION INTRAVENOUS at 10:46

## 2024-03-27 RX ADMIN — ROCURONIUM BROMIDE 100 MG: 50 INJECTION, SOLUTION INTRAVENOUS at 11:09

## 2024-03-27 RX ADMIN — PHENYLEPHRINE HYDROCHLORIDE 100 MCG: 10 INJECTION INTRAVENOUS at 11:40

## 2024-03-27 RX ADMIN — ALBUMIN HUMAN: 0.05 INJECTION, SOLUTION INTRAVENOUS at 13:49

## 2024-03-27 RX ADMIN — SUGAMMADEX 200 MG: 100 INJECTION, SOLUTION INTRAVENOUS at 16:44

## 2024-03-27 RX ADMIN — PROPOFOL 50 MG: 10 INJECTION, EMULSION INTRAVENOUS at 16:31

## 2024-03-27 RX ADMIN — FENTANYL CITRATE 50 MCG: 50 INJECTION, SOLUTION INTRAMUSCULAR; INTRAVENOUS at 17:11

## 2024-03-27 RX ADMIN — ACETAMINOPHEN 975 MG: 325 TABLET, FILM COATED ORAL at 10:24

## 2024-03-27 RX ADMIN — PHENYLEPHRINE HYDROCHLORIDE 100 MCG: 10 INJECTION INTRAVENOUS at 11:46

## 2024-03-27 RX ADMIN — HYDROMORPHONE HYDROCHLORIDE 0.4 MG: 0.2 INJECTION, SOLUTION INTRAMUSCULAR; INTRAVENOUS; SUBCUTANEOUS at 20:45

## 2024-03-27 RX ADMIN — ONDANSETRON 4 MG: 2 INJECTION INTRAMUSCULAR; INTRAVENOUS at 10:45

## 2024-03-27 RX ADMIN — PHENYLEPHRINE HYDROCHLORIDE 100 MCG: 10 INJECTION INTRAVENOUS at 13:59

## 2024-03-27 RX ADMIN — SODIUM CHLORIDE, POTASSIUM CHLORIDE, SODIUM LACTATE AND CALCIUM CHLORIDE: 600; 310; 30; 20 INJECTION, SOLUTION INTRAVENOUS at 18:44

## 2024-03-27 ASSESSMENT — ACTIVITIES OF DAILY LIVING (ADL)
ADLS_ACUITY_SCORE: 18
ADLS_ACUITY_SCORE: 25
ADLS_ACUITY_SCORE: 18
ADLS_ACUITY_SCORE: 33
ADLS_ACUITY_SCORE: 18

## 2024-03-27 ASSESSMENT — LIFESTYLE VARIABLES: TOBACCO_USE: 0

## 2024-03-27 ASSESSMENT — ENCOUNTER SYMPTOMS
DYSRHYTHMIAS: 0
SEIZURES: 0

## 2024-03-27 NOTE — ANESTHESIA PROCEDURE NOTES
Airway       Patient location during procedure: OR       Procedure Start/Stop Times: 3/27/2024 11:12 AM  Staff -        Anesthesiologist:  Namrata Bowen MD       CRNA: Holli Mendoza APRN CRNA       Performed By: CRNA  Consent for Airway        Urgency: elective  Indications and Patient Condition       Indications for airway management: dung-procedural       Induction type:intravenous       Mask difficulty assessment: 1 - vent by mask    Final Airway Details       Final airway type: endotracheal airway       Successful airway: ETT - single  Endotracheal Airway Details        ETT size (mm): 7.0       Cuffed: yes       Successful intubation technique: video laryngoscopy       VL Blade Size: Mendez 3       Grade View of Cords: 1       Adjucts: stylet       Position: Right       Measured from: lips       Secured at (cm): 22       Bite block used: None    Post intubation assessment        Placement verified by: capnometry, equal breath sounds and chest rise        Number of attempts at approach: 1       Secured with: tape       Ease of procedure: easy       Dentition: Intact and Unchanged    Medication(s) Administered   Medication Administration Time: 3/27/2024 11:12 AM

## 2024-03-27 NOTE — ANESTHESIA PREPROCEDURE EVALUATION
Anesthesia Pre-Procedure Evaluation    Patient: Misty Serrano   MRN: [id number] : 1995        Procedure : Procedure(s):  Robot-Assisted Sigmoid Colectomy          Past Medical History:   Diagnosis Date    Benign essential hypertension     FRANCES (generalized anxiety disorder)     History of diverticulitis     recurrent    Hypokalemia     Impaired fasting glucose     Morbid obesity (H)     BRENDEN (obstructive sleep apnea)     NO CPAP    Persistent depressive disorder       Past Surgical History:   Procedure Laterality Date    COLONOSCOPY N/A 2021    Procedure: Colonoscopy, With Polypectomy And Biopsy;  Surgeon: Tejal Jarrett DO;  Location: [facility] OR    COLONOSCOPY WITH CO2 INSUFFLATION N/A 2021    Procedure: COLONOSCOPY, WITH CO2 INSUFFLATION;  Surgeon: Tejal Jarrett DO;  Location: [facility] OR    NO HISTORY OF SURGERY        Allergies   Allergen Reactions    Seasonal Allergies       Social History     Tobacco Use    Smoking status: Never    Smokeless tobacco: Never    Tobacco comments:     No tobacco use, tried vaping a couple of times in early 20s but have not used for several years.   Substance Use Topics    Alcohol use: Not Currently     Comment: Probably drink once a month, if at all.      Wt Readings from Last 1 Encounters:   24 120.4 kg (265 lb 6.4 oz)        Anesthesia Evaluation   Pt has had prior anesthetic.     No history of anesthetic complications       ROS/MED HX  ENT/Pulmonary:     (+) sleep apnea, doesn't use CPAP,                                   (-) tobacco use, asthma and recent URI   Neurologic:    (-) no seizures, no CVA and no TIA   Cardiovascular:     (+)  hypertension (Patient took amlodipine-losartan combo at home this AM)- -   -  - -                                   (-) arrhythmias   METS/Exercise Tolerance:     Hematologic:       Musculoskeletal:       GI/Hepatic: Comment: Recurrent acute complicated diverticulitis with possible fistulous connection to the left ovary.    "(-) GERD and liver disease   Renal/Genitourinary:       Endo:     (+)               Obesity (BMI 39.2),    (-) Type II DM   Psychiatric/Substance Use:     (+) psychiatric history depression and anxiety       Infectious Disease:    (-) Recent Fever   Malignancy:       Other:               OUTSIDE LABS:  CBC:   Lab Results   Component Value Date    WBC 9.7 03/18/2024    WBC 10.0 01/28/2024    HGB 12.8 03/18/2024    HGB 9.8 (L) 01/28/2024    HCT 40.6 03/18/2024    HCT 30.2 (L) 01/28/2024     03/18/2024     01/28/2024     BMP:   Lab Results   Component Value Date     03/18/2024     01/28/2024    POTASSIUM 4.6 03/18/2024    POTASSIUM 3.2 (L) 01/29/2024    CHLORIDE 104 03/18/2024    CHLORIDE 101 01/28/2024    CO2 24 03/18/2024    CO2 26 01/28/2024    BUN 12.4 03/18/2024    BUN 3.1 (L) 01/28/2024    CR 0.76 03/18/2024    CR 0.54 01/28/2024     (H) 03/18/2024     (H) 01/28/2024     COAGS: No results found for: \"PTT\", \"INR\", \"FIBR\"  POC:   Lab Results   Component Value Date    HCG Negative 01/04/2024     HEPATIC:   Lab Results   Component Value Date    ALBUMIN 3.9 01/25/2024    PROTTOTAL 7.9 01/25/2024    ALT 34 01/25/2024    AST 18 01/25/2024    ALKPHOS 78 01/25/2024    BILITOTAL 0.5 01/25/2024     OTHER:   Lab Results   Component Value Date    LACT 0.8 01/27/2024    A1C 6.0 (H) 07/14/2023    JOSÉ 9.5 03/18/2024    MAG 2.2 01/28/2024    TSH 1.56 07/12/2022       Anesthesia Plan    ASA Status:  2    NPO Status:  NPO Appropriate    Anesthesia Type: General.     - Airway: ETT   Induction: Intravenous, Propofol.   Maintenance: Balanced.   Techniques and Equipment:     - Lines/Monitors: 2nd IV     Consents    Anesthesia Plan(s) and associated risks, benefits, and realistic alternatives discussed. Questions answered and patient/representative(s) expressed understanding.     - Discussed: Risks, Benefits and Alternatives for the PROCEDURE were discussed     - Discussed with:  Patient        " "    Postoperative Care    Pain management: IV analgesics, Oral pain medications, Multi-modal analgesia.   PONV prophylaxis: Ondansetron (or other 5HT-3), Dexamethasone or Solumedrol, Background Propofol Infusion     Comments:               Namrata Bowen MD    I have reviewed the pertinent notes and labs in the chart from the past 30 days and (re)examined the patient.  Any updates or changes from those notes are reflected in this note.              # Obesity: Estimated body mass index is 39.19 kg/m  as calculated from the following:    Height as of 3/18/24: 1.753 m (5' 9\").    Weight as of 3/18/24: 120.4 kg (265 lb 6.4 oz).      "

## 2024-03-27 NOTE — OP NOTE
PREOPERATIVE DIAGNOSIS: Recurrent Diverticulitis with intra-abdominal abscess     POSTOPERATIVE DIAGNOSIS: Same     OPERATION PERFORMED:   1. Robotic sigmoid colon resection with colorectal anastomosis at 10 cm.   2. Rigid proctoscopy with anastomotic leak testing   3. Intra-operative fluorescence angiography with ICG     Surgeon:  Namrata Evangelista MD  Assistant: SUHAIL Veloz PA-C, PA-C     ANESTHESIA: General.      INDICATION: Misty Serrano is a 29 year old  year old woman who has had multiple previous attacks of diverticulitis.  She last underwent a complete colonoscopy in 2021 with no evidence of a luminal mass, and attempted colonoscopy yesterday demonstrated no luminal mass within the sigmoid colon however her prep was inadequate to complete the colonoscopy.  Given her persistent symptoms, and recurrent episodes since December of last year, she was recommended to undergo surgical intervention with a minimally invasive sigmoid colectomy. The risks and benefits of a robotic assisted sigmoid colectomy with a possible ostomy bag were discussed and the patient agreed to proceed.      OPERATIVE FINDINGS: Diverticular disease in the mid sigmoid colon with significantly inflamed colon, thickened mesentery, multiple small pericolonic abscesses, and adhesions to the left tube and ovary. No other significant intra-abdominal pathology.      PROCEDURE IN DETAIL: After informed consent was obtained, the patient was brought to the operating room and placed in the supine position on the operating room table. Sequential compression devices were placed on the lower extremities prior to induction and general anesthesia was induced without difficulty. The patient was placed into a well-padded dorsal lithotomy position and IV antibiotics were administered. A Pigasi pad was used on the operating room table to prevent her  from sliding off the table in steep Trendelenburg position.  The  abdomen was sterilely prepped and draped in standard fashion.      The abdomen was entered using a Veress technique in the left upper quadrant. After a positive saline drop test, the abdomen was insufflated with CO2 gas to a pressure of 15 mmHg.  An incision was then made just superior and lateral to the umbilicus in the right abdomen and an 8 mm bladeless trocar was inserted into the abdominal cavity through the port incision.  The abdomen was then explored and the operative findings are noted above.  Three additional robotic ports were placed in a straight line across the abdomen, 12 mm in the right mid-abdomen and two 8 mm trochars in the left abdomen.  A 5 mm air seal was then placed superior to the robotic trochars in between the right and supraumbilical port sites.      The patient was then placed in steep Trendelenburg position and the small bowel was gently swept out of the pelvis using a blunt grasper.  We then docked the robot using a tip up grasper in arm 1, a fenestrated bipolar in arm 2, the camera in arm 3 and monopolar scissors in arm 4.  There were adhesions between the sigmoid colon and the lateral sidewall so we started with our lateral dissection.  This dissection was very tedious given the significant inflammation, fibrotic tissue as well as the multiple, small abscess cavities.  There were probably 4 distinct abscess cavities with approximately 5 to 10 mL of purulent drainage from.  We are eventually able to mobilize the sigmoid colon off of the sigmoid fossa, and entering into the normal lateral plane.  We continued this dissection along the white line of Toldt extending towards the splenic flexure, and then we continued this into the pelvis. We then began the dissection from the medial plane. We placed the sigmoid colon on tension towards the anterior abdominal wall revealing the KEVIN pedicle on stretch.  We then created a window below the colonic mesentery and deep to the inferior mesenteric  artery and created a plane between the retroperitoneum and the colonic mesentery.  We were able to easily identify the left ureter during this dissection and keep it protected. We then isolated the KEVIN pedicle and divided it with the robotic ligasure device. Hemostasis of the pedicle was confirmed.  We continued to divide the colonic mesentery along the bare area up to the level of the splenic flexure.  We then continued our dissection toward the pelvis. Once the sigmoid colon had been adequate mobilized, we identified our proximal transection point in the descending colon where the bowel appeared healthy and there was no inflamed mesentery and in the proximal rectum, which also did not appear to be involved in the diverticular disease.  Unfortunately, we did need to go into the mid descending colon to identify normal appearing colon, so full mobilization of the splenic flexure was required.     We then created a mesenteric window at the level of the proximal rectum and divided the mesentery clearing the rectum for stapling. The 45 mm robotic stapler green load was used to divide the rectum with two firings. The rectal stump appeared pink, well perfused and hemostatic. We then identified our proximal transection point.  We performed a mesenteric dissection up to the level of the bowel wall. We then had the anesthesia providers inject 4 ml of ICG intravenously for intra-operative fluorescence angiography and confirmed viability of our colon conduit and rectal stump.     Given the proximal transection site within the descending colon, we did need to mobilize the full splenic flexure.  We performed our lateral mobilization to the extent that we could with the robot docked towards the pelvis, and then flipped the boom and redocked the robot towards the left upper quadrant.  We started by mobilizing the remaining lateral attachments to the level of the splenic flexure, and then began our medial dissection mobilizing the  omentum off of the transverse colon.  We entered into the lesser sac, and eventually were able to make our dissection planes meet.  This dissection was relatively tedious due to the significant amount of omentum and mesenteric fat.  Eventually we were able to take down all of the lateral and medial attachments to the splenic flexure, and complete our mobilization.  With this additional mobilization, the colon conduit easily reach the rectal stump within the pelvis.  We then undocked the robot and proceeded with the open portion of the case.      An 8 cm Pfannenstiel incision was made 2 fingerbreadths above the pubic symphysis and dissection carried down to the level of the fascia.  The fascia was opened, the rectus muscle divided and we entered the peritoneal cavity. An Dru wound retractor was then placed into this incision.  We initially started with a 6 cm incision, however given the bulky nature of the significant diverticular disease and inflamed mesentery, we did extend the incision to 8 cm and were eventually able to deliver the sigmoid colon through the incision. The remaining mesentery was divided using the laparoscopic LigaSure device.  The colon was then divided between 2 bowel clamps. The specimen was then handed off for permanent pathology.  The proximal end was then opened and a pursestring suture was created using a 2-0 double-armed prolene suture.  This was tightened around the anvil of the 29 mm EEA stapler. Any additional fat caught in the pursestring suture was trimmed off and the colon was pink and viable with excellent blood supply.  A second pursestring was made with the same suture and this was tied snugly around the anvil.     We then temporarily closed the pfannenstiel incision by twisting the Dru wound retractor closed. The abdomen was then reinsufflated and the anastomosis performed laparoscopically.  Deshaun Stout PA-C, then went below and inserted the stapling instrument into the  anal canal, advancing the spike through the staple line of the rectal stump. The two ends of the stapler were connected, the orientation of the colon conduit confirmed and the stapler tightened and fired. Two anastomotic donuts were completely intact. The anastomosis was tested by insufflating from below through the proctoscope with the anastomosis under saline. There was no evidence of leak despite multiple insufflations. The anastomosis was under no tension and there was excellent blood supply on both sides of the anastomosis. Excess air was evacuated from below through the proctoscope.       The abdomen was irrigated with a copious amount of saline solution. The abdomen was suctioned dry. Hemostasis was evident. We then closed the 12 mm robotic stapler port under direct visualization with two #0 Vicryl sutures using the Triloq device.We then proceeded with closing the Pfannenstiel incision.  The peritoneum was closed with a running 2-0 Vicryl suture and the rectus muscle reapproximated with interrupted 2-0 Vicryl sutures.  The fascia was closed with 2 #0 looped PDS sutures from either end.  All wounds were irrigated with a copious amount of saline solution and the skin was closed with 4-0 Monocryl sutures the skin dressed with skin glue.      This operation cannot have been safely performed without compromising the technical results of the procedure without a skilled surgical assistant.  The surgical assistant was necessary for positioning, intraoperative retraction, as well as management of the robotic instruments at bedside.  They were also necessary for creation of the colorectal anastomosis and meticulous wound closure. They ensured that the procedure could be completed in a technically safe and efficient manner.    The patient tolerated this procedure well without complications.  At the end of the procedure, all sponge needle and instrument counts were correct.  The patient was extubated in the  operating room and brought to the recovery room in stable condition.      EBL: 50 ml  SPECIMEN: sigmoid and descending colon  COMPLICATIONS: none

## 2024-03-27 NOTE — ANESTHESIA POSTPROCEDURE EVALUATION
Patient: Misty Serrano    Procedure: Procedure(s):  Robot-Assisted Sigmoid Colectomy with full mobilization of splenic flexure       Anesthesia Type:  General    Note:     Postop Pain Control: Uneventful            Sign Out: Well controlled pain   PONV: No   Neuro/Psych: Uneventful            Sign Out: Acceptable/Baseline neuro status   Airway/Respiratory: Uneventful            Sign Out: Acceptable/Baseline resp. status   CV/Hemodynamics: Uneventful            Sign Out: Acceptable CV status; No obvious hypovolemia; No obvious fluid overload   Other NRE: NONE   DID A NON-ROUTINE EVENT OCCUR? No           Last vitals:  Vitals Value Taken Time   /82 03/27/24 1800   Temp 36.7  C (98  F) 03/27/24 1654   Pulse 112 03/27/24 1815   Resp 18 03/27/24 1700   SpO2 95 % 03/27/24 1815   Vitals shown include unfiled device data.    Electronically Signed By: Jersey Jain MD  March 27, 2024  6:33 PM

## 2024-03-27 NOTE — BRIEF OP NOTE
[facility]    Brief Operative Note    Pre-operative diagnosis: Diverticulitis [K57.92]  Post-operative diagnosis Same as pre-operative diagnosis    Procedure: Robot-Assisted Sigmoid Colectomy with full mobilization of splenic flexure, N/A - Abdomen    Surgeon: Surgeon(s) and Role:     * Namrata Evangelista MD - Primary     * Deshaun Stout PA-C - Assisting     * Renzo Drummond PA-C - Assisting  Anesthesia: General   Estimated Blood Loss: 50 mL from 3/27/2024 11:05 AM to 3/27/2024  4:51 PM      Drains: None  Specimens:   ID Type Source Tests Collected by Time Destination   1 : Sigmoid and descending colon Tissue Large Intestine, Colon, Descending/Sigmoid SURGICAL PATHOLOGY EXAM Namrata Evangelista MD 3/27/2024  3:27 PM      Findings:   Diverticular disease in sigmoid colon, significantly inflamed colon and mesentery, multiple small pericolonic abscesses, adhesions to the left tube and ovary .  Complications: None.  Implants: * No implants in log *    Condition on discharge from OR: Satisfactory    Renzo Drummond PA-C   [facility]   [phone].        ADDENDUM:    PATIENT DATA  Indicate Y or N:  Home O2 No  Hemodialysis  No  Transplant patient  No  Cirrhosis  No  Steroids in last 30 days  No  Immunomodulators in last 30 days  No  Anticoagulation at time of surgery  No   List medication N/A  Prior abdominal surgery  No  Pelvic irradiation  No    Albumin within 30 days if known 4.5  Hgb within 30 days if known   Hemoglobin   Date Value Ref Range Status   03/27/2024 13.5 11.7 - 15.7 g/dL Final   07/04/2021 14.6 11.7 - 15.7 g/dL Final   ]  Cr within 30 days if known   Creatinine   Date Value Ref Range Status   03/27/2024 0.66 0.51 - 0.95 mg/dL Final   07/04/2021 0.72 0.52 - 1.04 mg/dL Final   ]  Body mass index is 38.84 kg/m .      OR DATA  Emergent  No   <24 hours  No   <1 week  No  Bowel Prep Yes  Antibiotics  Yes  DVT prophylaxis    Heparin   Yes   SCD  Yes   None  No  Drain  No  ASA (1,2,3,4) 2  OR time (min) 294  Stents  No  Transfuse >/= 2U  No  Anastomosis   Stapled  Yes   Handsewn  No  Leak Test    Positive  No   Negative  Yes   Not done  No

## 2024-03-27 NOTE — ANESTHESIA CARE TRANSFER NOTE
Patient: Misty Serrano    Procedure: Procedure(s):  Robot-Assisted Sigmoid Colectomy with full mobilization of splenic flexure       Diagnosis: Diverticulitis [K57.92]  Diagnosis Additional Information: No value filed.    Anesthesia Type:   General     Note:    Oropharynx: spontaneously breathing and oropharynx clear of all foreign objects  Level of Consciousness: awake  Oxygen Supplementation: face mask  Level of Supplemental Oxygen (L/min / FiO2): 6  Independent Airway: airway patency satisfactory and stable  Dentition: dentition unchanged  Vital Signs Stable: post-procedure vital signs reviewed and stable  Report to RN Given: handoff report given  Patient transferred to: PACU    Handoff Report: Identifed the Patient, Identified the Reponsible Provider, Reviewed the pertinent medical history, Discussed the surgical course, Reviewed Intra-OP anesthesia mangement and issues during anesthesia, Set expectations for post-procedure period and Allowed opportunity for questions and acknowledgement of understanding      Vitals:  Vitals Value Taken Time   /83 03/27/24 1654   Temp 36.4    Pulse 99 03/27/24 1656   Resp 14    SpO2 99 % 03/27/24 1656   Vitals shown include unfiled device data.    Electronically Signed By: DARCIE Gonzalez CRNA  March 27, 2024  4:57 PM

## 2024-03-28 ENCOUNTER — DOCUMENTATION ONLY (OUTPATIENT)
Dept: OTHER | Facility: CLINIC | Age: 29
End: 2024-03-28
Payer: COMMERCIAL

## 2024-03-28 LAB
ANION GAP SERPL CALCULATED.3IONS-SCNC: 10 MMOL/L (ref 7–15)
BUN SERPL-MCNC: 7.4 MG/DL (ref 6–20)
CALCIUM SERPL-MCNC: 8.4 MG/DL (ref 8.6–10)
CHLORIDE SERPL-SCNC: 102 MMOL/L (ref 98–107)
CREAT SERPL-MCNC: 0.57 MG/DL (ref 0.51–0.95)
DEPRECATED HCO3 PLAS-SCNC: 26 MMOL/L (ref 22–29)
EGFRCR SERPLBLD CKD-EPI 2021: >90 ML/MIN/1.73M2
GLUCOSE BLDC GLUCOMTR-MCNC: 109 MG/DL (ref 70–99)
GLUCOSE SERPL-MCNC: 115 MG/DL (ref 70–99)
HGB BLD-MCNC: 10.9 G/DL (ref 11.7–15.7)
MAGNESIUM SERPL-MCNC: 1.9 MG/DL (ref 1.7–2.3)
PHOSPHATE SERPL-MCNC: 2.9 MG/DL (ref 2.5–4.5)
PLATELET # BLD AUTO: 269 10E3/UL (ref 150–450)
POTASSIUM SERPL-SCNC: 3.3 MMOL/L (ref 3.4–5.3)
POTASSIUM SERPL-SCNC: 3.7 MMOL/L (ref 3.4–5.3)
SODIUM SERPL-SCNC: 138 MMOL/L (ref 135–145)

## 2024-03-28 PROCEDURE — 84100 ASSAY OF PHOSPHORUS: CPT | Performed by: COLON & RECTAL SURGERY

## 2024-03-28 PROCEDURE — 85018 HEMOGLOBIN: CPT | Performed by: COLON & RECTAL SURGERY

## 2024-03-28 PROCEDURE — 36415 COLL VENOUS BLD VENIPUNCTURE: CPT | Performed by: COLON & RECTAL SURGERY

## 2024-03-28 PROCEDURE — 250N000013 HC RX MED GY IP 250 OP 250 PS 637: Performed by: COLON & RECTAL SURGERY

## 2024-03-28 PROCEDURE — 84132 ASSAY OF SERUM POTASSIUM: CPT | Performed by: HOSPITALIST

## 2024-03-28 PROCEDURE — 80048 BASIC METABOLIC PNL TOTAL CA: CPT | Performed by: COLON & RECTAL SURGERY

## 2024-03-28 PROCEDURE — 120N000001 HC R&B MED SURG/OB

## 2024-03-28 PROCEDURE — 250N000013 HC RX MED GY IP 250 OP 250 PS 637: Performed by: HOSPITALIST

## 2024-03-28 PROCEDURE — 83735 ASSAY OF MAGNESIUM: CPT | Performed by: COLON & RECTAL SURGERY

## 2024-03-28 PROCEDURE — 85049 AUTOMATED PLATELET COUNT: CPT | Performed by: COLON & RECTAL SURGERY

## 2024-03-28 PROCEDURE — 250N000011 HC RX IP 250 OP 636: Performed by: COLON & RECTAL SURGERY

## 2024-03-28 PROCEDURE — 36415 COLL VENOUS BLD VENIPUNCTURE: CPT | Performed by: HOSPITALIST

## 2024-03-28 RX ORDER — POTASSIUM CHLORIDE 1500 MG/1
40 TABLET, EXTENDED RELEASE ORAL ONCE
Status: COMPLETED | OUTPATIENT
Start: 2024-03-28 | End: 2024-03-28

## 2024-03-28 RX ADMIN — AMLODIPINE BESYLATE 5 MG: 5 TABLET ORAL at 07:54

## 2024-03-28 RX ADMIN — HYOSCYAMINE SULFATE 250 MCG: 0.12 TABLET ORAL at 18:59

## 2024-03-28 RX ADMIN — HYOSCYAMINE SULFATE 250 MCG: 0.12 TABLET ORAL at 23:34

## 2024-03-28 RX ADMIN — KETOROLAC TROMETHAMINE 15 MG: 15 INJECTION, SOLUTION INTRAMUSCULAR; INTRAVENOUS at 09:04

## 2024-03-28 RX ADMIN — HYDROMORPHONE HYDROCHLORIDE 4 MG: 2 TABLET ORAL at 12:06

## 2024-03-28 RX ADMIN — POTASSIUM CHLORIDE 40 MEQ: 1500 TABLET, EXTENDED RELEASE ORAL at 09:04

## 2024-03-28 RX ADMIN — HYDROMORPHONE HYDROCHLORIDE 2 MG: 2 TABLET ORAL at 01:11

## 2024-03-28 RX ADMIN — HEPARIN SODIUM 5000 UNITS: 5000 INJECTION, SOLUTION INTRAVENOUS; SUBCUTANEOUS at 15:13

## 2024-03-28 RX ADMIN — HYOSCYAMINE SULFATE 250 MCG: 0.12 TABLET ORAL at 01:13

## 2024-03-28 RX ADMIN — HEPARIN SODIUM 5000 UNITS: 5000 INJECTION, SOLUTION INTRAVENOUS; SUBCUTANEOUS at 22:46

## 2024-03-28 RX ADMIN — KETOROLAC TROMETHAMINE 15 MG: 15 INJECTION, SOLUTION INTRAMUSCULAR; INTRAVENOUS at 19:00

## 2024-03-28 RX ADMIN — HYDROMORPHONE HYDROCHLORIDE 4 MG: 2 TABLET ORAL at 07:53

## 2024-03-28 RX ADMIN — HYDROMORPHONE HYDROCHLORIDE 4 MG: 2 TABLET ORAL at 18:04

## 2024-03-28 RX ADMIN — HYDROMORPHONE HYDROCHLORIDE 4 MG: 2 TABLET ORAL at 04:28

## 2024-03-28 RX ADMIN — HYDROMORPHONE HYDROCHLORIDE 4 MG: 2 TABLET ORAL at 22:46

## 2024-03-28 RX ADMIN — VALSARTAN 160 MG: 160 TABLET, FILM COATED ORAL at 07:54

## 2024-03-28 ASSESSMENT — ACTIVITIES OF DAILY LIVING (ADL)
ADLS_ACUITY_SCORE: 25

## 2024-03-28 NOTE — PROGRESS NOTES
[facility]  PROGRESS NOTE    March 28, 2024  Post-op Day # 1    SUBJECTIVE:  Doing okay, slept intermittently overnight. Pain controlled with oral dilaudid q3 hr, Tylenol, and Toradol. Tolerating clears, has not tried full liquids yet. Has not passed gas or stool. 1.3 L UOP. Hgb 10.9 from 13.5. Tachy to low 100s, afebrile. Otherwise AVSS. On 1 L O2.     OBJECTIVE:  Temp:  [98  F (36.7  C)-98.4  F (36.9  C)] 98.4  F (36.9  C)  Pulse:  [100-117] 101  Resp:  [16-23] 22  BP: (123-170)/() 124/83  SpO2:  [88 %-100 %] 95 %    Intake/Output Summary (Last 24 hours) at 3/28/2024 0836  Last data filed at 3/28/2024 0549  Gross per 24 hour   Intake 2912.1 ml   Output 1340 ml   Net 1572.1 ml       GENERAL:  Awake, alert, no acute distress, lying in bed  HEAD: Nomocephalic atraumatic  SCLERA: anicteric  EXTREMITIES: warm and well perfused  ABDOMEN:  Soft, appropriately tender, mildly-distended, no rebound or guarding, no peritoneal signs  INCISION:  C/d/i, some mild swelling inferior aspect of right port site    LABS:  Lab Results   Component Value Date    WBC 11.4 03/27/2024    WBC 14.0 07/04/2021     Lab Results   Component Value Date    HGB 10.9 03/28/2024    HGB 14.6 07/04/2021     Lab Results   Component Value Date    HCT 41.4 03/27/2024    HCT 45.0 07/04/2021     Lab Results   Component Value Date     03/28/2024     07/04/2021     Last Basic Metabolic Panel:  Lab Results   Component Value Date     03/28/2024     07/04/2021      Lab Results   Component Value Date    POTASSIUM 3.3 03/28/2024    POTASSIUM 4.0 07/29/2022    POTASSIUM 3.9 07/04/2021     Lab Results   Component Value Date    CHLORIDE 102 03/28/2024    CHLORIDE 104 07/29/2022    CHLORIDE 104 07/04/2021     Lab Results   Component Value Date    JOSÉ 8.4 03/28/2024    JOSÉ 9.0 07/04/2021     Lab Results   Component Value Date    CO2 26 03/28/2024    CO2 25 07/29/2022    CO2 30 07/04/2021     Lab Results   Component Value Date     BUN 7.4 03/28/2024    BUN 11 07/29/2022    BUN 8 07/04/2021     Lab Results   Component Value Date    CR 0.57 03/28/2024    CR 0.72 07/04/2021     Lab Results   Component Value Date     03/28/2024     03/28/2024    GLC 95 07/29/2022     07/04/2021       ASSESSMENT/PLAN: Suki Serrano is a 29-year-old female POD#1 s/p robotic sigmoid colectomy for diverticulitis.     - Full liquids  - Remove pressley catheter today  - Pain management, minimize narcotics  - Encourage ambulation  - SQH for DVT ppx      Disposition: Expected discharge in 2-3 days.  Barriers to discharge: Tolerating low fiber diet, pain controlled with oral meds, return of bowel function.      For questions/paging, please contact the CRS office at [phone].    Renzo Drummond PA-C  [facility]  Phone: [phone]

## 2024-03-28 NOTE — PLAN OF CARE
Goal Outcome Evaluation:      Plan of Care Reviewed With: patient    Overall Patient Progress: improvingOverall Patient Progress: improving    Date & Time: 3/28/24 [id number]  Behavior & Aggression: green  Fall Risk: no  Orientation:A&OX4  ABNL VS/O2:VSS, RA.   Pain Management:pain controlled with po dilaudid.  Bladder: Clinton removed this morning, voiding adequate amounts.   Bowel: Positive BS, passing flatus, tolerating full liquid diet without nausea. Having loose tan stools today.  Inc/Drains: Abd lap sites CDI, ABD binder on.   Diet: Tolerating full liquid diet.  Activity Level: Up IND  Anticipated  DC Date: 3/29 or 3/30

## 2024-03-28 NOTE — PLAN OF CARE
Date & Time: 1900-2330.  Surgery/POD#: POD 0 from a Robotic-assisted sigmoid colectomy.  Behavior & Aggression: Green - no concerns.  Fall Risk: Yes.  Orientation:A&Ox4.  ABNL VS/O2: VSS on 2-3L of o2.  Denies N/V.  ABNL Labs: see chart.  Pain Management: PRN Dilaudid, Levsin, Ice packs, and Toradol.  Bowel/Bladder: Clinton w/ yellow UOP, hypoactive bowel sounds, no BM, passing little to no gas per pt.  IV/Drains/Skin: PIV infusing LR @ 75ml/hr. Abdominal incisions are C/D/I.  Diet: Full liquid.  Activity Level: Ax1.  Tests/Procedures: N/A.  Anticipated  DC Date: TBD.

## 2024-03-28 NOTE — PLAN OF CARE
Settled to room at 1830. A/O x4. VSS on 3L O2. Pain controlled with IV dilaudid x1. Ice chips/water given to patient. Oz bundy. Oriented to room.

## 2024-03-28 NOTE — PROGRESS NOTES
Date & Time: 1900-2330.  Surgery/POD#: POD 1 from a Robotic-assisted sigmoid colectomy.  Behavior & Aggression: GreenOrientation:A&Ox4.  ABNL VS/O2: VSS on 2-3Lof o2 overnight in lieu of CPAP.  Denies N/V.  ABNL Labs: see chart.  Pain Management: PRN Dilaudid.Toradol [id number]/Bladder: Clinton w/ yellow UOP, hypoactive bowel sounds, no BM, passing little to no gas per pt.  IV/Drains/Skin: PIV x2 L forearm infusing LR @ 75ml/hr. R hand SL.   Four abdominal incisions sealed with liquid bandage are C/D/I.  Diet: Full liquid.  Activity Level: Ax1.  Tests/Procedures: N/A.  Anticipated  DC Date: TBD.

## 2024-03-29 LAB
ANION GAP SERPL CALCULATED.3IONS-SCNC: 12 MMOL/L (ref 7–15)
BUN SERPL-MCNC: 8.6 MG/DL (ref 6–20)
CALCIUM SERPL-MCNC: 8.7 MG/DL (ref 8.6–10)
CHLORIDE SERPL-SCNC: 93 MMOL/L (ref 98–107)
CREAT SERPL-MCNC: 0.66 MG/DL (ref 0.51–0.95)
DEPRECATED HCO3 PLAS-SCNC: 26 MMOL/L (ref 22–29)
EGFRCR SERPLBLD CKD-EPI 2021: >90 ML/MIN/1.73M2
ERYTHROCYTE [DISTWIDTH] IN BLOOD BY AUTOMATED COUNT: 15.4 % (ref 10–15)
GLUCOSE BLDC GLUCOMTR-MCNC: 108 MG/DL (ref 70–99)
GLUCOSE SERPL-MCNC: 113 MG/DL (ref 70–99)
HCT VFR BLD AUTO: 35.9 % (ref 35–47)
HGB BLD-MCNC: 11.4 G/DL (ref 11.7–15.7)
HGB BLD-MCNC: 11.6 G/DL (ref 11.7–15.7)
HOLD SPECIMEN: NORMAL
MCH RBC QN AUTO: 27.7 PG (ref 26.5–33)
MCHC RBC AUTO-ENTMCNC: 31.8 G/DL (ref 31.5–36.5)
MCV RBC AUTO: 87 FL (ref 78–100)
PATH REPORT.COMMENTS IMP SPEC: NORMAL
PATH REPORT.COMMENTS IMP SPEC: NORMAL
PATH REPORT.FINAL DX SPEC: NORMAL
PATH REPORT.GROSS SPEC: NORMAL
PATH REPORT.MICROSCOPIC SPEC OTHER STN: NORMAL
PATH REPORT.RELEVANT HX SPEC: NORMAL
PHOTO IMAGE: NORMAL
PLATELET # BLD AUTO: 298 10E3/UL (ref 150–450)
POTASSIUM SERPL-SCNC: 3.3 MMOL/L (ref 3.4–5.3)
POTASSIUM SERPL-SCNC: 3.6 MMOL/L (ref 3.4–5.3)
POTASSIUM SERPL-SCNC: 3.6 MMOL/L (ref 3.4–5.3)
RBC # BLD AUTO: 4.12 10E6/UL (ref 3.8–5.2)
SODIUM SERPL-SCNC: 131 MMOL/L (ref 135–145)
WBC # BLD AUTO: 14.4 10E3/UL (ref 4–11)

## 2024-03-29 PROCEDURE — 80048 BASIC METABOLIC PNL TOTAL CA: CPT | Performed by: COLON & RECTAL SURGERY

## 2024-03-29 PROCEDURE — 85027 COMPLETE CBC AUTOMATED: CPT | Performed by: PHYSICIAN ASSISTANT

## 2024-03-29 PROCEDURE — 250N000011 HC RX IP 250 OP 636: Performed by: COLON & RECTAL SURGERY

## 2024-03-29 PROCEDURE — 84132 ASSAY OF SERUM POTASSIUM: CPT | Performed by: PHYSICIAN ASSISTANT

## 2024-03-29 PROCEDURE — 258N000003 HC RX IP 258 OP 636: Performed by: SURGERY

## 2024-03-29 PROCEDURE — 36415 COLL VENOUS BLD VENIPUNCTURE: CPT | Performed by: COLON & RECTAL SURGERY

## 2024-03-29 PROCEDURE — 250N000013 HC RX MED GY IP 250 OP 250 PS 637: Performed by: COLON & RECTAL SURGERY

## 2024-03-29 PROCEDURE — 85018 HEMOGLOBIN: CPT | Performed by: COLON & RECTAL SURGERY

## 2024-03-29 PROCEDURE — 36415 COLL VENOUS BLD VENIPUNCTURE: CPT | Performed by: PHYSICIAN ASSISTANT

## 2024-03-29 PROCEDURE — 120N000001 HC R&B MED SURG/OB

## 2024-03-29 RX ORDER — POTASSIUM CHLORIDE 1500 MG/1
40 TABLET, EXTENDED RELEASE ORAL ONCE
Status: COMPLETED | OUTPATIENT
Start: 2024-03-29 | End: 2024-03-29

## 2024-03-29 RX ADMIN — KETOROLAC TROMETHAMINE 15 MG: 15 INJECTION, SOLUTION INTRAMUSCULAR; INTRAVENOUS at 07:49

## 2024-03-29 RX ADMIN — KETOROLAC TROMETHAMINE 15 MG: 15 INJECTION, SOLUTION INTRAMUSCULAR; INTRAVENOUS at 01:03

## 2024-03-29 RX ADMIN — AMLODIPINE BESYLATE 5 MG: 5 TABLET ORAL at 07:50

## 2024-03-29 RX ADMIN — HYOSCYAMINE SULFATE 250 MCG: 0.12 TABLET ORAL at 10:33

## 2024-03-29 RX ADMIN — HYDROMORPHONE HYDROCHLORIDE 4 MG: 2 TABLET ORAL at 03:56

## 2024-03-29 RX ADMIN — HEPARIN SODIUM 5000 UNITS: 5000 INJECTION, SOLUTION INTRAVENOUS; SUBCUTANEOUS at 14:48

## 2024-03-29 RX ADMIN — HYDROMORPHONE HYDROCHLORIDE 4 MG: 2 TABLET ORAL at 18:06

## 2024-03-29 RX ADMIN — POTASSIUM CHLORIDE 40 MEQ: 1500 TABLET, EXTENDED RELEASE ORAL at 18:06

## 2024-03-29 RX ADMIN — VALSARTAN 160 MG: 160 TABLET, FILM COATED ORAL at 07:50

## 2024-03-29 RX ADMIN — SODIUM CHLORIDE, POTASSIUM CHLORIDE, SODIUM LACTATE AND CALCIUM CHLORIDE 500 ML: 600; 310; 30; 20 INJECTION, SOLUTION INTRAVENOUS at 16:51

## 2024-03-29 RX ADMIN — HEPARIN SODIUM 5000 UNITS: 5000 INJECTION, SOLUTION INTRAVENOUS; SUBCUTANEOUS at 23:40

## 2024-03-29 RX ADMIN — HYOSCYAMINE SULFATE 250 MCG: 0.12 TABLET ORAL at 18:06

## 2024-03-29 RX ADMIN — HYOSCYAMINE SULFATE 250 MCG: 0.12 TABLET ORAL at 03:56

## 2024-03-29 RX ADMIN — HEPARIN SODIUM 5000 UNITS: 5000 INJECTION, SOLUTION INTRAVENOUS; SUBCUTANEOUS at 06:35

## 2024-03-29 RX ADMIN — HYDROMORPHONE HYDROCHLORIDE 4 MG: 2 TABLET ORAL at 10:33

## 2024-03-29 RX ADMIN — KETOROLAC TROMETHAMINE 15 MG: 15 INJECTION, SOLUTION INTRAMUSCULAR; INTRAVENOUS at 14:47

## 2024-03-29 ASSESSMENT — ACTIVITIES OF DAILY LIVING (ADL)
ADLS_ACUITY_SCORE: 20

## 2024-03-29 NOTE — PROGRESS NOTES
MD Notification    Notified Person: MD    Notified Person Name:  thee    Notification Date/Time: 1604, 3/29/24    Notification Interaction: Called CRS office    Purpose of Notification: Pt. HR sustaining in 120s-130s with low grade fever.    Orders Received: Ordering labs, and provider will come see pt.     Comments: Ordered CBC, BNP, and 500cc bolus.

## 2024-03-29 NOTE — PLAN OF CARE
Date & Time: 3/29/24, [id number]  Surgery/POD#: POD 2 from a Robotic-assisted sigmoid colectomy.  Behavior & Aggression: Green   Fall Risk: Yes.  Orientation:A&Ox4.  ABNL VS/O2: VSS on RA ex tachycardic and low grade fever. Denies N/V.  ABNL Labs: WBC trending up (14.4)  Pain Management: PRN Dilaudid, Levsin, Toradol, and Ice packs.  Bowel/Bladder: Voiding spontaneously via bathroom, active bowel sounds. passing gas per pt.  IV/Drains/Skin: PIV SL. Abdominal incisions CDI  Diet: Advanced diet to low fiber.  Tolerating well.   Activity Level: UAL. Continue to encourage ambulation.  Tests/Procedures: N/A.  Anticipated  DC Date: Tomorrow pending adequate pain control.    Sustaining HR in 120s-130s this afternoon with low grade fever- 500cc LR bolus and CBC and BMP ordered. Potassium replaced. HR down to 118, temp down to 99.5F.

## 2024-03-29 NOTE — PLAN OF CARE
Date & Time: 1900-0730.  Surgery/POD#: POD 2 from a Robotic-assisted sigmoid colectomy.  Behavior & Aggression: Green - no concerns.  Fall Risk: Yes.  Orientation:A&Ox4.  ABNL VS/O2: VSS on RA. Denies N/V.  ABNL Labs: see chart.  Pain Management: PRN Dilaudid, Levsin, Toradol, and Ice packs.  Bowel/Bladder: Voiding spontaneously via bathroom, active bowel sounds, couple loose BMs this shift, passing gas per pt.  IV/Drains/Skin: PIV SL. Abdominal incisions are C/D/I.  Diet: Full liquid.  Activity Level: IND.  Tests/Procedures: N/A.  Anticipated  DC Date: TBD.

## 2024-03-29 NOTE — PROGRESS NOTES
COLON & RECTAL SURGERY  PROGRESS NOTE    March 29, 2024  Post-op Day # 2    SUBJECTIVE: Patient reports she is doing well and pain is controlled on current regimen. Tolerating full liquid diet. Pt reports passing gas. Loose BM overnight per nursing. Ambulating halls without difficulty. Denies n/v. Hgb improved 11.6 (from 10.9 yesterday) and potassium 3.7 (from 3.3 yesterday). UOP 1,000mL. Tachy to low 100s for last two days and temperature of 99.9F this am. Otherwise AVSS on RA.     OBJECTIVE:  Temp:  [98.2  F (36.8  C)-99.9  F (37.7  C)] 99.9  F (37.7  C)  Pulse:  [110-117] 114  Resp:  [17-18] 18  BP: (128-146)/(78-84) 146/84  SpO2:  [94 %-98 %] 98 %    Intake/Output Summary (Last 24 hours) at 3/29/2024 1053  Last data filed at 3/29/2024 1000  Gross per 24 hour   Intake 1300 ml   Output 2250 ml   Net -950 ml       GENERAL:  Awake, alert, no acute distress, lying in bed  HEAD: Normocephalic atraumatic  SCLERA: Anicteric  EXTREMITIES: Warm and well perfused  ABDOMEN:  Soft, appropriately tender per incisions, non-distended. No guarding, rigidity, or peritoneal signs.  INCISION:  C/d/i    LABS:  Lab Results   Component Value Date    WBC 11.4 03/27/2024    WBC 14.0 07/04/2021     Lab Results   Component Value Date    HGB 11.6 03/29/2024    HGB 14.6 07/04/2021     Lab Results   Component Value Date    HCT 41.4 03/27/2024    HCT 45.0 07/04/2021     Lab Results   Component Value Date     03/28/2024     07/04/2021     Last Basic Metabolic Panel:  Lab Results   Component Value Date     03/28/2024     07/04/2021      Lab Results   Component Value Date    POTASSIUM 3.7 03/28/2024    POTASSIUM 4.0 07/29/2022    POTASSIUM 3.9 07/04/2021     Lab Results   Component Value Date    CHLORIDE 102 03/28/2024    CHLORIDE 104 07/29/2022    CHLORIDE 104 07/04/2021     Lab Results   Component Value Date    JOSÉ 8.4 03/28/2024    JOSÉ 9.0 07/04/2021     Lab Results   Component Value Date    CO2 26 03/28/2024    CO2  25 07/29/2022    CO2 30 07/04/2021     Lab Results   Component Value Date    BUN 7.4 03/28/2024    BUN 11 07/29/2022    BUN 8 07/04/2021     Lab Results   Component Value Date    CR 0.57 03/28/2024    CR 0.72 07/04/2021     Lab Results   Component Value Date     03/29/2024    GLC 95 07/29/2022     07/04/2021       ASSESSMENT/PLAN: Suki is a 29yoF POD#2 s/p robotic sigmoid colectomy for diverticulitis. If she continues to show clinical improvement, labs WNL, tolerating diet, and adequate pain control, possible discharge tomorrow.    - Advance to low fiber diet as tolerated  -Recheck CBC in am and monitor VS due to low-grade fever and persistent tachycardia.  - PRN pain meds, minimize narcotics  - discontinue IVF with adequate po intake and urine output  - OOB, ambulate  - SQH for DVT ppx    Discussed with Dr. To.     For questions/paging, please contact the CRS office at [phone].    Maria Elena Tom PA-C  Colorectal Physician Assistant    [facility]  [address]  [place] [address]  T: [phone]  F: [phone]     Colon and Rectal Surgery Staff  I performed a history and physical examination of the patient and discussed their management with the physician assistant. I reviewed the physician assistants note and agree with the documented findings and plan of care.     Tolerating diet.  Tachycardic and Tmax 99.9.      Alert, NAD  Abd soft, ND, ttp per incisions    Plan:   Cont LFD  CBC in am  Anticipate home tomorrow    Natalya To MD, FACS, FASCRS    [facility]  [address]  [place] [address]  T: [phone]  F: [phone]

## 2024-03-30 VITALS
OXYGEN SATURATION: 97 % | WEIGHT: 263 LBS | SYSTOLIC BLOOD PRESSURE: 118 MMHG | HEIGHT: 69 IN | BODY MASS INDEX: 38.95 KG/M2 | TEMPERATURE: 98.6 F | HEART RATE: 99 BPM | RESPIRATION RATE: 18 BRPM | DIASTOLIC BLOOD PRESSURE: 82 MMHG

## 2024-03-30 LAB
ANION GAP SERPL CALCULATED.3IONS-SCNC: 12 MMOL/L (ref 7–15)
BUN SERPL-MCNC: 8.3 MG/DL (ref 6–20)
CALCIUM SERPL-MCNC: 8.5 MG/DL (ref 8.6–10)
CHLORIDE SERPL-SCNC: 98 MMOL/L (ref 98–107)
CREAT SERPL-MCNC: 0.59 MG/DL (ref 0.51–0.95)
DEPRECATED HCO3 PLAS-SCNC: 25 MMOL/L (ref 22–29)
EGFRCR SERPLBLD CKD-EPI 2021: >90 ML/MIN/1.73M2
ERYTHROCYTE [DISTWIDTH] IN BLOOD BY AUTOMATED COUNT: 15.3 % (ref 10–15)
GLUCOSE SERPL-MCNC: 108 MG/DL (ref 70–99)
HCT VFR BLD AUTO: 32.4 % (ref 35–47)
HGB BLD-MCNC: 10.2 G/DL (ref 11.7–15.7)
MCH RBC QN AUTO: 27.3 PG (ref 26.5–33)
MCHC RBC AUTO-ENTMCNC: 31.5 G/DL (ref 31.5–36.5)
MCV RBC AUTO: 87 FL (ref 78–100)
PLATELET # BLD AUTO: 253 10E3/UL (ref 150–450)
POTASSIUM SERPL-SCNC: 3.5 MMOL/L (ref 3.4–5.3)
RBC # BLD AUTO: 3.74 10E6/UL (ref 3.8–5.2)
SODIUM SERPL-SCNC: 135 MMOL/L (ref 135–145)
WBC # BLD AUTO: 12.2 10E3/UL (ref 4–11)

## 2024-03-30 PROCEDURE — 250N000011 HC RX IP 250 OP 636: Performed by: COLON & RECTAL SURGERY

## 2024-03-30 PROCEDURE — 36415 COLL VENOUS BLD VENIPUNCTURE: CPT | Performed by: COLON & RECTAL SURGERY

## 2024-03-30 PROCEDURE — 250N000013 HC RX MED GY IP 250 OP 250 PS 637: Performed by: COLON & RECTAL SURGERY

## 2024-03-30 PROCEDURE — 85014 HEMATOCRIT: CPT

## 2024-03-30 PROCEDURE — 80048 BASIC METABOLIC PNL TOTAL CA: CPT | Performed by: COLON & RECTAL SURGERY

## 2024-03-30 RX ORDER — HYDROMORPHONE HYDROCHLORIDE 2 MG/1
2 TABLET ORAL EVERY 4 HOURS PRN
Qty: 15 TABLET | Refills: 0 | Status: SHIPPED | OUTPATIENT
Start: 2024-03-30 | End: 2024-04-23

## 2024-03-30 RX ORDER — ACETAMINOPHEN 500 MG
1000 TABLET ORAL EVERY 6 HOURS PRN
Qty: 40 TABLET | Refills: 0 | Status: SHIPPED | OUTPATIENT
Start: 2024-03-30

## 2024-03-30 RX ORDER — IBUPROFEN 600 MG/1
600 TABLET, FILM COATED ORAL EVERY 6 HOURS PRN
Qty: 30 TABLET | Refills: 0 | Status: SHIPPED | OUTPATIENT
Start: 2024-03-30 | End: 2024-04-23

## 2024-03-30 RX ADMIN — HYOSCYAMINE SULFATE 250 MCG: 0.12 TABLET ORAL at 14:40

## 2024-03-30 RX ADMIN — KETOROLAC TROMETHAMINE 15 MG: 15 INJECTION, SOLUTION INTRAMUSCULAR; INTRAVENOUS at 02:43

## 2024-03-30 RX ADMIN — HYDROMORPHONE HYDROCHLORIDE 4 MG: 2 TABLET ORAL at 14:41

## 2024-03-30 RX ADMIN — AMLODIPINE BESYLATE 5 MG: 5 TABLET ORAL at 09:34

## 2024-03-30 RX ADMIN — VALSARTAN 160 MG: 160 TABLET, FILM COATED ORAL at 09:34

## 2024-03-30 RX ADMIN — HEPARIN SODIUM 5000 UNITS: 5000 INJECTION, SOLUTION INTRAVENOUS; SUBCUTANEOUS at 07:45

## 2024-03-30 RX ADMIN — HEPARIN SODIUM 5000 UNITS: 5000 INJECTION, SOLUTION INTRAVENOUS; SUBCUTANEOUS at 15:42

## 2024-03-30 RX ADMIN — HYOSCYAMINE SULFATE 250 MCG: 0.12 TABLET ORAL at 02:44

## 2024-03-30 ASSESSMENT — ACTIVITIES OF DAILY LIVING (ADL)
ADLS_ACUITY_SCORE: 20

## 2024-03-30 NOTE — PROGRESS NOTES
Date & Time: 3/29/24-03/30/24, 7364-7839  Surgery/POD#: POD 3 from a Robotic-assisted sigmoid colectomy.  Behavior & Aggression: Green   Fall Risk: Yes.  Orientation:A&Ox4.  ABNL VS/O2: VSS on RA ex tachycardic (104)    ABNL Labs: WBC trending up (14.4)  Pain Management: PRN Levsin, Toradol, and Ice packs.  Bowel/Bladder: Voiding spontaneously via bathroom, active bowel sounds. passing gas per pt.  IV/Drains/Skin: PIV SL. Abdominal incisions CDI  Diet: low fiber.  Tolerating.  Activity Level:   up independently. Ambulated 1x.  Tests/Procedures: N/A.  Anticipated  DC Date: pending improvement.     Anticoagulation teaching started.

## 2024-03-30 NOTE — PLAN OF CARE
Patient discharging home with family. AVS gone over with patient in room and prescriptions given to patient. PIV removed. All questions answered.

## 2024-03-31 NOTE — DISCHARGE SUMMARY
[facility] Discharge Summary      Misty Serrano MRN# [id number]   Age: 29 year old YOB: 1995     Date of Admission:  3/27/2024  Date of Discharge::  3/30/2024  4:35 PM  Admitting Physician:  Namrata Evangelista MD  Discharge Physician:  Bridger Vitale MD     PCP:  No Ref-Primary, Physician    Disposition: Patient discharged from [facility] to home in stable condition.        Primary Diagnosis:   Recurrent diverticulitis            Discharge Medications:     Discharge Medication List as of 3/30/2024  3:35 PM        START taking these medications    Details   HYDROmorphone (DILAUDID) 2 MG tablet Take 1 tablet (2 mg) by mouth every 4 hours as needed (for severe pain not relieved by tylenol), Disp-15 tablet, R-0, Local Print      ibuprofen (ADVIL/MOTRIN) 600 MG tablet Take 1 tablet (600 mg) by mouth every 6 hours as needed (for pain), Disp-30 tablet, R-0, E-Prescribe           CONTINUE these medications which have CHANGED    Details   acetaminophen (TYLENOL) 500 MG tablet Take 2 tablets (1,000 mg) by mouth every 6 hours as needed for pain, Disp-40 tablet, R-0, E-Prescribe           CONTINUE these medications which have NOT CHANGED    Details   amLODIPine-valsartan (EXFORGE) 5-160 MG tablet Take 1 tablet by mouth daily, Disp-90 tablet, R-3, E-Prescribe      etonogestrel (NEXPLANON) 68 MG IMPL Historical      fexofenadine (ALLEGRA) 180 MG tablet Take 180 mg by mouth daily as needed (SEASONAL ALLERGIES), Historical      hyoscyamine (LEVSIN) 0.125 MG tablet TAKE 1 TABLET(125 MCG) BY MOUTH EVERY 6 HOURS AS NEEDED FOR CRAMPING, Disp-30 tablet, R-1, E-Prescribe      lactobacillus rhamnosus, GG, (CULTURELL) capsule Take 1 capsule by mouth daily as needed, Historical                    Follow Up, Special Instructions:     Discharge diet: Low fiber   Discharge activity: Follow up in the office in 4 weeks with Dr. Evangelista or at your already scheduled post op visit. Call [phone] to  "schedule your appointment.     Call the office at [phone] if you develop fever >101, uncontrolled pain, bleeding, nausea, vomiting, or constipation (no stool for 4-5 days).     No heavy lifting >15 pounds for 6 weeks. No driving while taking narcotic pain medications.  Continue a low fiber diet for one week, then may resume a regular diet.              Procedures:     Procedure(s): Robotic sigmoid colectomy               Consultations:   None          Brief Hospital Summary:     Patient is a 29 year old woman who underwent robotic sigmoid colectomy on 3/27 by Dr. Evangelista.   There were no immediate complications during this procedure.    Please refer to the full operative summary for details.  The patient's hospital course was notable for low grade fevers and tachycardia, which had resolved at time of discharge. Her WBC also improved and had decreased to 12 on day of discharge.  Pain was controlled on oral pain regimen.  She was tolerating a low fiber diet.  Bowel function had returned prior to discharge.  She recovered as anticipated and experienced no post-operative complications.    Physical Exam on Day of Discharge  /82 (BP Location: Right arm)   Pulse 99   Temp 98.6  F (37  C) (Oral)   Resp 18   Ht 1.753 m (5' 9\")   Wt 119.3 kg (263 lb)   SpO2 97%   BMI 38.84 kg/m      Gen: lying in bed comfortably  Heart: RRR  Lungs: normal WOB on RA  Abd: soft, nondistended, appropriately tender, incisions c/d/I with dermabond  Extr: WWP      Attestation:  I have reviewed today's vital signs, notes, medications, labs and imaging.    Pt d/w Dr. Vitale.    Carlos Ramirez MD, MS  Fellow, Colon & Rectal Surgery  [facility]  03/31/2024  5:06 PM    Colorectal Surgery can be reached at [phone] at all times. Between 5pm and 7am you will be connected to the on-call physician                ADDENDUM:  Length of stay: 3 days  Indicate Y or N for the following:  UTI  No  C diff  No  PNA  No  SSI No  DVT " No  PE  No  CVA No  MI No  Enterocutaneous fistula  No  Peripheral nerve injury  No  Abscess (not adjacent to anastomosis)  No  Leak No    Treated with:   Antibiotics N/A   Drain  N/A   Reoperation  N/A  Death within 30 days No  Reintubation  No  Reoperation  No   Procedure n/a

## 2024-04-02 ENCOUNTER — PATIENT OUTREACH (OUTPATIENT)
Dept: CARE COORDINATION | Facility: CLINIC | Age: 29
End: 2024-04-02
Payer: COMMERCIAL

## 2024-04-02 NOTE — PROGRESS NOTES
Clinic Care Coordination Contact  [facility]: Post-Discharge Note  SITUATION                                                      Admission:    Admission Date: 03/27/24   Reason for Admission: Diverticulitis  Discharge:   Discharge Date: 03/30/24  Discharge Diagnosis: Diverticulitis    BACKGROUND                                                      Per hospital discharge summary and inpatient provider notes:    Patient is a 29 year old woman who underwent robotic sigmoid colectomy on 3/27 by Dr. Evangelista.   There were no immediate complications during this procedure.    Please refer to the full operative summary for details.  The patient's hospital course was notable for low grade fevers and tachycardia, which had resolved at time of discharge. Her WBC also improved and had decreased to 12 on day of discharge.  Pain was controlled on oral pain regimen.      ASSESSMENT           Discharge Assessment  How are you doing now that you are home?: feeling better  How are your symptoms? (Red Flag symptoms escalate to triage hotline per guidelines): Improved  Do you feel your condition is stable enough to be safe at home until your provider visit?: Yes  Does the patient have their discharge instructions? : Yes  Does the patient have questions regarding their discharge instructions? : No  Were you started on any new medications or were there changes to any of your previous medications? : Yes  Does the patient have all of their medications?: Yes  Do you have questions regarding any of your medications? : No  Do you have all of your needed medical supplies or equipment (DME)?  (i.e. oxygen tank, CPAP, cane, etc.): Yes  Discharge follow-up appointment scheduled within 14 calendar days? : No  Is patient agreeable to assistance with scheduling? : No    Post-op ([facility] Only)  If the patient had a surgery or procedure, do they have any questions for a nurse?: No         PLAN                                                       Outpatient Plan:      Follow up in the office in 4 weeks with Dr. Evangelista or at your already scheduled post op visit. Call [phone] to schedule your appointment.      Call the office at [phone] if you develop fever >101, uncontrolled pain, bleeding, nausea, vomiting, or constipation (no stool for 4-5 days).     Future Appointments   Date Time Provider Department Center   7/8/2024  8:30 AM Melody Echevarria MD [facility]    7/10/2024  2:30 PM Tejal Jarrett DO [facility]     For any urgent concerns, please contact our 24 hour nurse triage line: [phone]     Vandana, SARAIW  [phone]  [facility] Care Resource [facility]

## 2024-04-08 ENCOUNTER — MYC MEDICAL ADVICE (OUTPATIENT)
Dept: SLEEP MEDICINE | Facility: CLINIC | Age: 29
End: 2024-04-08
Payer: COMMERCIAL

## 2024-04-08 DIAGNOSIS — G47.33 OSA (OBSTRUCTIVE SLEEP APNEA): Primary | ICD-10-CM

## 2024-04-10 ENCOUNTER — MYC REFILL (OUTPATIENT)
Dept: INTERNAL MEDICINE | Facility: CLINIC | Age: 29
End: 2024-04-10
Payer: COMMERCIAL

## 2024-04-10 DIAGNOSIS — R10.9 ABDOMINAL CRAMPING: ICD-10-CM

## 2024-04-10 RX ORDER — HYOSCYAMINE SULFATE 0.125 MG
TABLET ORAL
Qty: 30 TABLET | Refills: 1 | Status: SHIPPED | OUTPATIENT
Start: 2024-04-10

## 2024-04-11 NOTE — TELEPHONE ENCOUNTER
Patient is wanting to try oral appliance as she is struggling with CPAP.  Pended for provider consideration.

## 2024-04-16 ENCOUNTER — TELEPHONE (OUTPATIENT)
Dept: GASTROENTEROLOGY | Facility: CLINIC | Age: 29
End: 2024-04-16
Payer: COMMERCIAL

## 2024-04-16 ENCOUNTER — TRANSFERRED RECORDS (OUTPATIENT)
Dept: INTERNAL MEDICINE | Facility: CLINIC | Age: 29
End: 2024-04-16
Payer: COMMERCIAL

## 2024-04-16 NOTE — TELEPHONE ENCOUNTER
[facility] Call Center    Phone Message    May a detailed message be left on voicemail: yes     Reason for Call: Other: Pt is requesting a call back from team please to discuss her visit that is needing to be rescheduled. She is wanting to be seen before the next available visit on 10/23 as she was scheduled out in advance to be seen. Please advise. Thank you!     Action Taken: Message routed to:  [facility] ([facility]): GI    Travel Screening: Not Applicable

## 2024-04-17 NOTE — TELEPHONE ENCOUNTER
received a message from NATHAN LAZARO to help get Pt scheduled for a sooner follow up appointment with Severino Jackson. Per NATHAN LAZARO, Pt would need an appointment in the next week or two.  called and talked with the Pt. Pt accepted an appointment with Severino Jackson on 4/23/2024 at 7 AM for an in-person clinic visit.

## 2024-04-18 ENCOUNTER — DOCUMENTATION ONLY (OUTPATIENT)
Dept: OTHER | Facility: CLINIC | Age: 29
End: 2024-04-18
Payer: COMMERCIAL

## 2024-04-23 ENCOUNTER — OFFICE VISIT (OUTPATIENT)
Dept: GASTROENTEROLOGY | Facility: CLINIC | Age: 29
End: 2024-04-23
Payer: COMMERCIAL

## 2024-04-23 VITALS
HEART RATE: 97 BPM | WEIGHT: 263.6 LBS | OXYGEN SATURATION: 99 % | DIASTOLIC BLOOD PRESSURE: 85 MMHG | SYSTOLIC BLOOD PRESSURE: 133 MMHG | BODY MASS INDEX: 38.93 KG/M2

## 2024-04-23 DIAGNOSIS — K57.92 DIVERTICULITIS: Primary | ICD-10-CM

## 2024-04-23 PROCEDURE — 99213 OFFICE O/P EST LOW 20 MIN: CPT | Performed by: PHYSICIAN ASSISTANT

## 2024-04-23 ASSESSMENT — PAIN SCALES - GENERAL: PAINLEVEL: NO PAIN (0)

## 2024-04-23 NOTE — PATIENT INSTRUCTIONS
It was a pleasure taking care of you today.  I've included a brief summary of our discussion and care plan from today's visit below.  Please review this information with your primary care provider.  ______________________________________________________________________    My recommendations are summarized as follows:    Should recurrent symptoms of diverticulitis arise in the future, we discussed adhering to a clear liquid diet.  If symptoms are not improving in three days, or if severe symptoms, fever greater than 100.4 develop, please contact your primary care provider.     In regards to prevention, recommend a healthy diet rich in fiber (>25 grams per day), maintain a normal body mass index, engage in routine physical activity, and avoid tobacco use.      -- please see scheduling information provided below     Return to GI Clinic as needed.    ______________________________________________________________________    How do I schedule labs, imaging studies, or procedures that were ordered in clinic today?     Labs: To schedule lab appointment at the [facility], use my chart or call [phone]. If you have a [facility] lab closer to home where you are regularly seen you can give them a call.     Procedures: If a colonoscopy, upper endoscopy, breath test, esophageal manometry, or pH impedence was ordered today, our endoscopy team will call you to schedule this. If you have not heard from our endoscopy team within a week, please call [phone] to schedule.     Imaging Studies: If you were scheduled for a CT scan, X-ray, MRI, ultrasound, HIDA scan or other imaging study, please call [phone] to have this scheduled.     Referral: If a referral to another specialty was ordered, expect a phone call or follow instructions above. If you have not heard from anyone regarding your referral in a week, please call our clinic to check the status.     Who do I call with  any questions after my visit?  Please be in touch if there are any further questions that arise following today's visit.  There are multiple ways to contact your gastroenterology care team.      During business hours, you may reach a Gastroenterology nurse at [phone]    To schedule or reschedule an appointment, please call [phone].     You can always send a secure message through [employer].  [employer] messages are answered by your nurse or doctor typically within 24 hours.  Please allow extra time on weekends and holidays.      For urgent/emergent questions after business hours, you may reach the on-call GI Fellow by contacting the [facility]  at [phone].     How will I get the results of any tests ordered?    You will receive all of your results.  If you have signed up for [facility], any tests ordered at your visit will be available to you after your provider reviews them.  Typically this takes 1-2 weeks.  If there are urgent results that require a change in your care plan, your provider or nurse will call you to discuss the next steps.      What is [employer]?  [employer] is a secure way for you to access all of your healthcare records from the [facility].  It is a web based computer program, so you can sign on to it from any location.  It also allows you to send secure messages to your care team.  I recommend signing up for [employer] access if you have not already done so and are comfortable with using a computer.      How to I schedule a follow-up visit?  If you did not schedule a follow-up visit today, please call [phone] to schedule a follow-up office visit.      Sincerely,    Severino Jackson PA-C  Division of Gastroenterology, Hepatology & Nutrition  [facility]

## 2024-04-23 NOTE — LETTER
4/23/2024         RE: Misty Serrano  [address]  [facility] [address]        Dear Colleague,    Thank you for referring your patient, Misty Serrano, to the [facility]. Please see a copy of my visit note below.    GI CLINIC VISIT    CC/REFERRING MD:  No ref. provider found  REASON FOR FOLLOW UP: recurrent diverticulitis      ASSESSMENT/PLAN:  30 y/o F presents for follow up of recurrent diverticulitis.     #recurrent diverticulitis s/p sigmid colectomy  Patient underwent a sigmoid colectomy on 3/27/24 due to complicated diverticulitis. Since her last visit with GI she has had two episodes of diverticulitis, this last one being complicated, and prompting need for sigmod colectomy. Since surgery she has been doing quite well.   Should recurrent symptoms of diverticulitis arise in the future, we discussed adhering to a clear liquid diet.  If symptoms are not improving in three days, or if severe symptoms, fever greater than 100.4 develop, please contact your primary care provider. As per AGA guidelines, antibiotic treatment is not always indicated for mild uncomplicated episodes, but should be considered in patients whom are frail, have significant co-morbidities refractory symptoms, elevated white blood cell count >15k and/or elevated C reactive protein >140 mg/L. Antibiotic therapy should be administered in patients with complicated diverticulitis, or uncomplicated diverticulitis with a fluid collection or longer segment of inflammation on CT scan.In regards to prevention, recommend a healthy diet rich in fiber (>25 grams per day), maintain a normal body mass index, engage in routine physical activity, and avoid tobacco use. She will have a colonoscopy done in two years by her colorectal surgeon.       RTC PRN.    Thank you for this consultation.  It was a pleasure to participate in the care of this patient; please contact us with any further questions.     This note  was created with voice recognition software, and while reviewed for accuracy, typos may remain.    Severino Jackson PA-C  Division of Gastroenterology, Hepatology and Nutrition  [facility]    25 minutes spent on the date of the encounter doing chart review, review of outside records, review of test results, patient visit, and documentation          HPI  30 y/o F presents for follow up of recurrent diverticulitis.     Patient was last evaluated by Dr. Jarrett on 6/2/21, please see visit details below:  Suki  presents today for a video visit to discuss recurrent diverticulitis.  First episode was in July of 2020 - had a CT at the time that showed uncomplicated diverticulitis at the junction of the sigmoid and descending colon.  Symptoms rapidly improved with antibiotics.  Had another episode in October with CT again showing inflammatory changes in the same area.  Has had a few episodes since which were treated with antibiotics although no imaging was completed.  Has tried managing conservatively with antispasmodics like levsin, however, has usually eventually required antibiotics.   In between episodes she feels well without abdominal pain, diarrhea, blood in the stool, etc.  Does struggle occasionally with constipation which she tries to manage by increasing the amount of fiber in her diet.     Rare NSAIDs.  Never smoker.  No alcohol.  No other concerns today.    4/23/24:  Patient underwent a colonoscopy in August 2021 which showed evidence of diverticulosis in the sigmoid colon and descending colon. Pathology was otherwise normal. Patient was admitted from 1/25/24-1/29/24 for LLQ abdominal pain, and recent treatment of diverticulitis with augmentin. She presented to the ED with worsenign abdominal pain, CT showed worsening sigmoid diverticulitis with development of small fluid and gas filled sinus tract extending into sigmoid mesenteric fat which extends to the left ovary.  She was started on IV zosyn and was seen by CRC. She subsequently underwent a robotic sigmoid colectomy on 3/27/24.     Patient has been doing quite well since the sigmoid resection. Denies abdominal pain. Denies nausea or vomiting.   Patient reports that she has has a BM daily, usually 2-3x/day since having surgery. She describes the stool as a bristol type 4. Denies BRBPR.     She had post op visit with Dr. Benites ([facility]) - things were going well. She will have a repeat colonoscopy in two years.     Patient has been trying to incorporate more fiber into her diet, she was previously taking metamucil gummies prior to the surgery.    Had been avoiding NSAIDs but since the surgery, has been alternating between tylenol and ibuprofen.       ROS:    No fevers or chills  No weight loss  No shortness of breath or wheezing  No chest pain or pressure  No odynophagia or dysphagia  No BRBPR, hematochezia, melena  No anxiety or depression      PROBLEM LIST  Patient Active Problem List    Diagnosis Date Noted     Diverticulitis 03/27/2024     Priority: Medium     BRENDEN (obstructive sleep apnea) 08/15/2023     Priority: Medium     8/14/2023 [facility] Study (278.0 lbs) - AHI 49.6, RDI 90.4, Supine AHI 65.5, REM AHI 43.6, Low O2 81.0%, Time Spent =88% 20.2 minutes / Time Spent =89% 32.0 minutes.       Impaired fasting glucose 07/14/2023     Priority: Medium     Benign essential hypertension 07/13/2023     Priority: Medium     History of diverticulitis 07/11/2022     Priority: Medium     recurrent       Persistent depressive disorder      Priority: Medium     FRANCES (generalized anxiety disorder)      Priority: Medium     Morbid obesity (H) 01/17/2020     Priority: Medium       PERTINENT PAST MEDICAL HISTORY:  Past Medical History:   Diagnosis Date     Benign essential hypertension      Depressive disorder      FRANCES (generalized anxiety disorder)      History of diverticulitis     recurrent     Hypokalemia      Impaired  fasting glucose      Morbid obesity (H)      BRENDEN (obstructive sleep apnea)     NO CPAP     Persistent depressive disorder        PREVIOUS SURGERIES:  Past Surgical History:   Procedure Laterality Date     COLECTOMY, SIGMOID, ROBOT-ASSISTED, LAPAROSCOPIC, USING DA MARGARITA XI N/A 3/27/2024    Procedure: Robot-Assisted Sigmoid Colectomy with full mobilization of splenic flexure;  Surgeon: Namrata Evangelista MD;  Location: [facility] OR     COLONOSCOPY N/A 8/13/2021    Procedure: Colonoscopy, With Polypectomy And Biopsy;  Surgeon: Tejal Jarrett DO;  Location: [facility] OR     COLONOSCOPY WITH CO2 INSUFFLATION N/A 8/13/2021    Procedure: COLONOSCOPY, WITH CO2 INSUFFLATION;  Surgeon: Tejal Jarrett DO;  Location: [facility] OR     NO HISTORY OF SURGERY         PREVIOUS ENDOSCOPY:  C-scope (2021): - Diverticulosis in the sigmoid colon and in the                             descending colon.                             - Erythematous mucosa in the sigmoid colon.                             Biopsied.                             - The examined portion of the ileum was normal.   Biopsies normal.    ALLERGIES:     Allergies   Allergen Reactions     Seasonal Allergies        PERTINENT MEDICATIONS:    Current Outpatient Medications:      acetaminophen (TYLENOL) 500 MG tablet, Take 2 tablets (1,000 mg) by mouth every 6 hours as needed for pain, Disp: 40 tablet, Rfl: 0     amLODIPine-valsartan (EXFORGE) 5-160 MG tablet, Take 1 tablet by mouth daily, Disp: 90 tablet, Rfl: 3     etonogestrel (NEXPLANON) 68 MG IMPL, , Disp: , Rfl:      fexofenadine (ALLEGRA) 180 MG tablet, Take 180 mg by mouth daily as needed (SEASONAL ALLERGIES), Disp: , Rfl:      hyoscyamine (LEVSIN) 0.125 MG tablet, TAKE 1 TABLET(125 MCG) BY MOUTH EVERY 6 HOURS AS NEEDED FOR CRAMPING, Disp: 30 tablet, Rfl: 1     HYDROmorphone (DILAUDID) 2 MG tablet, Take 1 tablet (2 mg) by mouth every 4 hours as needed (for severe pain not relieved by tylenol), Disp: 15 tablet, Rfl: 0      ibuprofen (ADVIL/MOTRIN) 600 MG tablet, Take 1 tablet (600 mg) by mouth every 6 hours as needed (for pain), Disp: 30 tablet, Rfl: 0     lactobacillus rhamnosus, GG, (CULTURELL) capsule, Take 1 capsule by mouth daily as needed, Disp: , Rfl:     SOCIAL HISTORY:  Social History     Socioeconomic History     Marital status:      Spouse name: Not on file     Number of children: Not on file     Years of education: Not on file     Highest education level: Not on file   Occupational History     Occupation: [employer] -    Tobacco Use     Smoking status: Never     Smokeless tobacco: Never     Tobacco comments:     No tobacco use, tried vaping a couple of times in early 20s but have not used for several years.   Vaping Use     Vaping status: Never Used   Substance and Sexual Activity     Alcohol use: Not Currently     Comment: Probably drink once a month, if at all.     Drug use: Never     Sexual activity: Yes     Birth control/protection: Implant     Comment: Have been with same partner since August 2014.   Other Topics Concern     Parent/sibling w/ CABG, MI or angioplasty before 65F 55M? No   Social History Narrative    .    No kids.    No formal exercise.      Social Determinants of Health     Financial Resource Strain: Low Risk  (1/23/2024)    Financial Resource Strain      Within the past 12 months, have you or your family members you live with been unable to get utilities (heat, electricity) when it was really needed?: No   Food Insecurity: High Risk (1/23/2024)    Food Insecurity      Within the past 12 months, did you worry that your food would run out before you got money to buy more?: Yes      Within the past 12 months, did the food you bought just not last and you didn t have money to get more?: No   Transportation Needs: Low Risk  (1/23/2024)    Transportation Needs      Within the past 12 months, has lack of transportation kept you from medical appointments, getting your  medicines, non-medical meetings or appointments, work, or from getting things that you need?: No   Physical Activity: Not on file   Stress: Not on file   Social Connections: Not on file   Interpersonal Safety: Low Risk  (2/6/2024)    Interpersonal Safety      Do you feel physically and emotionally safe where you currently live?: Yes      Within the past 12 months, have you been hit, slapped, kicked or otherwise physically hurt by someone?: No      Within the past 12 months, have you been humiliated or emotionally abused in other ways by your partner or ex-partner?: No   Housing Stability: High Risk (1/23/2024)    Housing Stability      Do you have housing? : Yes      Are you worried about losing your housing?: Yes       FAMILY HISTORY:  Family History   Problem Relation Age of Onset     Hypertension Mother      Rheumatoid Arthritis Mother      Hypertension Father      Diabetes Father         Recently diagnosed     Genetic Disorder Father         Diverticulosis/diverticulitis - had 1 foot of colon removed due to terrible infection that hospitalized him     Diabetes Type 2  Maternal Grandfather      Coronary Artery Disease Maternal Grandfather         s/p CABG later in life     Prostate Cancer Maternal Grandfather         Recurred 3 times     Skin Cancer Maternal Grandfather      Diabetes Maternal Grandfather      Other Cancer Maternal Grandfather         Skin cancer a few times     Diabetes Type 2  Paternal Grandfather      Coronary Artery Disease Paternal Grandfather         s/p CABG later in life     Diabetes Paternal Grandfather      Genetic Disorder Paternal Grandfather         Unsure, but possibly diverticulosis/diverticulitis     Obesity Maternal Grandmother      Substance Abuse Sister         Alcoholism starting age 14 - sober since mid 2019     Myocardial Infarction No family hx of      Cerebrovascular Disease No family hx of      Coronary Artery Disease Early Onset No family hx of      Breast Cancer No  family hx of      Colon Cancer No family hx of      Ovarian Cancer No family hx of        Past/family/social history reviewed and no changes    PHYSICAL EXAMINATION:  Constitutional: aaox3, cooperative, pleasant, not dyspneic/diaphoretic, no acute distress  Vitals reviewed: /85 (BP Location: Left arm, Patient Position: Sitting, Cuff Size: Adult Large)   Pulse 97   Wt 119.6 kg (263 lb 9.6 oz)   SpO2 99%   BMI 38.93 kg/m    Wt:   Wt Readings from Last 2 Encounters:   03/27/24 119.3 kg (263 lb)   03/18/24 120.4 kg (265 lb 6.4 oz)      Eyes: Sclera anicteric/injected  Respiratory: Unlabored breathing  Abd: surgical scars are clean, dry, and intact.  Skin: warm, perfused, no jaundice  Psych: Normal affect  MSK: Normal gait                 Again, thank you for allowing me to participate in the care of your patient.        Sincerely,        Severino Jackson PA-C

## 2024-04-23 NOTE — PROGRESS NOTES
GI CLINIC VISIT    CC/REFERRING MD:  No ref. provider found  REASON FOR FOLLOW UP: recurrent diverticulitis      ASSESSMENT/PLAN:  28 y/o F presents for follow up of recurrent diverticulitis.     #recurrent diverticulitis s/p sigmid colectomy  Patient underwent a sigmoid colectomy on 3/27/24 due to complicated diverticulitis. Since her last visit with GI she has had two episodes of diverticulitis, this last one being complicated, and prompting need for sigmod colectomy. Since surgery she has been doing quite well.   Should recurrent symptoms of diverticulitis arise in the future, we discussed adhering to a clear liquid diet.  If symptoms are not improving in three days, or if severe symptoms, fever greater than 100.4 develop, please contact your primary care provider. As per AGA guidelines, antibiotic treatment is not always indicated for mild uncomplicated episodes, but should be considered in patients whom are frail, have significant co-morbidities refractory symptoms, elevated white blood cell count >15k and/or elevated C reactive protein >140 mg/L. Antibiotic therapy should be administered in patients with complicated diverticulitis, or uncomplicated diverticulitis with a fluid collection or longer segment of inflammation on CT scan.In regards to prevention, recommend a healthy diet rich in fiber (>25 grams per day), maintain a normal body mass index, engage in routine physical activity, and avoid tobacco use. She will have a colonoscopy done in two years by her colorectal surgeon.       RTC PRN.    Thank you for this consultation.  It was a pleasure to participate in the care of this patient; please contact us with any further questions.     This note was created with voice recognition software, and while reviewed for accuracy, typos may remain.    Severino Jackson PA-C  Division of Gastroenterology, Hepatology and Nutrition  [facility]    25 minutes spent on the date  of the encounter doing chart review, review of outside records, review of test results, patient visit, and documentation          HPI  30 y/o F presents for follow up of recurrent diverticulitis.     Patient was last evaluated by Dr. Jarrett on 6/2/21, please see visit details below:  Suki  presents today for a video visit to discuss recurrent diverticulitis.  First episode was in July of 2020 - had a CT at the time that showed uncomplicated diverticulitis at the junction of the sigmoid and descending colon.  Symptoms rapidly improved with antibiotics.  Had another episode in October with CT again showing inflammatory changes in the same area.  Has had a few episodes since which were treated with antibiotics although no imaging was completed.  Has tried managing conservatively with antispasmodics like levsin, however, has usually eventually required antibiotics.   In between episodes she feels well without abdominal pain, diarrhea, blood in the stool, etc.  Does struggle occasionally with constipation which she tries to manage by increasing the amount of fiber in her diet.     Rare NSAIDs.  Never smoker.  No alcohol.  No other concerns today.    4/23/24:  Patient underwent a colonoscopy in August 2021 which showed evidence of diverticulosis in the sigmoid colon and descending colon. Pathology was otherwise normal. Patient was admitted from 1/25/24-1/29/24 for LLQ abdominal pain, and recent treatment of diverticulitis with augmentin. She presented to the ED with worsenign abdominal pain, CT showed worsening sigmoid diverticulitis with development of small fluid and gas filled sinus tract extending into sigmoid mesenteric fat which extends to the left ovary. She was started on IV zosyn and was seen by CRC. She subsequently underwent a robotic sigmoid colectomy on 3/27/24.     Patient has been doing quite well since the sigmoid resection. Denies abdominal pain. Denies nausea or vomiting.   Patient reports that she has  has a BM daily, usually 2-3x/day since having surgery. She describes the stool as a bristol type 4. Denies BRBPR.     She had post op visit with Dr. Benites ([facility]) - things were going well. She will have a repeat colonoscopy in two years.     Patient has been trying to incorporate more fiber into her diet, she was previously taking metamucil gummies prior to the surgery.    Had been avoiding NSAIDs but since the surgery, has been alternating between tylenol and ibuprofen.       ROS:    No fevers or chills  No weight loss  No shortness of breath or wheezing  No chest pain or pressure  No odynophagia or dysphagia  No BRBPR, hematochezia, melena  No anxiety or depression      PROBLEM LIST  Patient Active Problem List    Diagnosis Date Noted    Diverticulitis 03/27/2024     Priority: Medium    BRENDEN (obstructive sleep apnea) 08/15/2023     Priority: Medium     8/14/2023 [place] [facility] Sleep Study (278.0 lbs) - AHI 49.6, RDI 90.4, Supine AHI 65.5, REM AHI 43.6, Low O2 81.0%, Time Spent ?88% 20.2 minutes / Time Spent ?89% 32.0 minutes.      Impaired fasting glucose 07/14/2023     Priority: Medium    Benign essential hypertension 07/13/2023     Priority: Medium    History of diverticulitis 07/11/2022     Priority: Medium     recurrent      Persistent depressive disorder      Priority: Medium    FRANCES (generalized anxiety disorder)      Priority: Medium    Morbid obesity (H) 01/17/2020     Priority: Medium       PERTINENT PAST MEDICAL HISTORY:  Past Medical History:   Diagnosis Date    Benign essential hypertension     Depressive disorder     FRANCES (generalized anxiety disorder)     History of diverticulitis     recurrent    Hypokalemia     Impaired fasting glucose     Morbid obesity (H)     BRENDEN (obstructive sleep apnea)     NO CPAP    Persistent depressive disorder        PREVIOUS SURGERIES:  Past Surgical History:   Procedure Laterality Date    COLECTOMY, SIGMOID, ROBOT-ASSISTED, LAPAROSCOPIC, USING DA MARGARITA XI N/A  3/27/2024    Procedure: Robot-Assisted Sigmoid Colectomy with full mobilization of splenic flexure;  Surgeon: Namrata Evangelista MD;  Location: [facility] OR    COLONOSCOPY N/A 8/13/2021    Procedure: Colonoscopy, With Polypectomy And Biopsy;  Surgeon: Tejal Jarrett DO;  Location: [facility] OR    COLONOSCOPY WITH CO2 INSUFFLATION N/A 8/13/2021    Procedure: COLONOSCOPY, WITH CO2 INSUFFLATION;  Surgeon: Tejal Jarrett DO;  Location: [facility] OR    NO HISTORY OF SURGERY         PREVIOUS ENDOSCOPY:  C-scope (2021): - Diverticulosis in the sigmoid colon and in the                             descending colon.                             - Erythematous mucosa in the sigmoid colon.                             Biopsied.                             - The examined portion of the ileum was normal.   Biopsies normal.    ALLERGIES:     Allergies   Allergen Reactions    Seasonal Allergies        PERTINENT MEDICATIONS:    Current Outpatient Medications:     acetaminophen (TYLENOL) 500 MG tablet, Take 2 tablets (1,000 mg) by mouth every 6 hours as needed for pain, Disp: 40 tablet, Rfl: 0    amLODIPine-valsartan (EXFORGE) 5-160 MG tablet, Take 1 tablet by mouth daily, Disp: 90 tablet, Rfl: 3    etonogestrel (NEXPLANON) 68 MG IMPL, , Disp: , Rfl:     fexofenadine (ALLEGRA) 180 MG tablet, Take 180 mg by mouth daily as needed (SEASONAL ALLERGIES), Disp: , Rfl:     hyoscyamine (LEVSIN) 0.125 MG tablet, TAKE 1 TABLET(125 MCG) BY MOUTH EVERY 6 HOURS AS NEEDED FOR CRAMPING, Disp: 30 tablet, Rfl: 1    HYDROmorphone (DILAUDID) 2 MG tablet, Take 1 tablet (2 mg) by mouth every 4 hours as needed (for severe pain not relieved by tylenol), Disp: 15 tablet, Rfl: 0    ibuprofen (ADVIL/MOTRIN) 600 MG tablet, Take 1 tablet (600 mg) by mouth every 6 hours as needed (for pain), Disp: 30 tablet, Rfl: 0    lactobacillus rhamnosus, GG, (CULTURELL) capsule, Take 1 capsule by mouth daily as needed, Disp: , Rfl:     SOCIAL HISTORY:  Social History     Socioeconomic  History    Marital status:      Spouse name: Not on file    Number of children: Not on file    Years of education: Not on file    Highest education level: Not on file   Occupational History    Occupation: [employer] -    Tobacco Use    Smoking status: Never    Smokeless tobacco: Never    Tobacco comments:     No tobacco use, tried vaping a couple of times in early 20s but have not used for several years.   Vaping Use    Vaping status: Never Used   Substance and Sexual Activity    Alcohol use: Not Currently     Comment: Probably drink once a month, if at all.    Drug use: Never    Sexual activity: Yes     Birth control/protection: Implant     Comment: Have been with same partner since August 2014.   Other Topics Concern    Parent/sibling w/ CABG, MI or angioplasty before 65F 55M? No   Social History Narrative    .    No kids.    No formal exercise.      Social Determinants of Health     Financial Resource Strain: Low Risk  (1/23/2024)    Financial Resource Strain     Within the past 12 months, have you or your family members you live with been unable to get utilities (heat, electricity) when it was really needed?: No   Food Insecurity: High Risk (1/23/2024)    Food Insecurity     Within the past 12 months, did you worry that your food would run out before you got money to buy more?: Yes     Within the past 12 months, did the food you bought just not last and you didn t have money to get more?: No   Transportation Needs: Low Risk  (1/23/2024)    Transportation Needs     Within the past 12 months, has lack of transportation kept you from medical appointments, getting your medicines, non-medical meetings or appointments, work, or from getting things that you need?: No   Physical Activity: Not on file   Stress: Not on file   Social Connections: Not on file   Interpersonal Safety: Low Risk  (2/6/2024)    Interpersonal Safety     Do you feel physically and emotionally safe where you  currently live?: Yes     Within the past 12 months, have you been hit, slapped, kicked or otherwise physically hurt by someone?: No     Within the past 12 months, have you been humiliated or emotionally abused in other ways by your partner or ex-partner?: No   Housing Stability: High Risk (1/23/2024)    Housing Stability     Do you have housing? : Yes     Are you worried about losing your housing?: Yes       FAMILY HISTORY:  Family History   Problem Relation Age of Onset    Hypertension Mother     Rheumatoid Arthritis Mother     Hypertension Father     Diabetes Father         Recently diagnosed    Genetic Disorder Father         Diverticulosis/diverticulitis - had 1 foot of colon removed due to terrible infection that hospitalized him    Diabetes Type 2  Maternal Grandfather     Coronary Artery Disease Maternal Grandfather         s/p CABG later in life    Prostate Cancer Maternal Grandfather         Recurred 3 times    Skin Cancer Maternal Grandfather     Diabetes Maternal Grandfather     Other Cancer Maternal Grandfather         Skin cancer a few times    Diabetes Type 2  Paternal Grandfather     Coronary Artery Disease Paternal Grandfather         s/p CABG later in life    Diabetes Paternal Grandfather     Genetic Disorder Paternal Grandfather         Unsure, but possibly diverticulosis/diverticulitis    Obesity Maternal Grandmother     Substance Abuse Sister         Alcoholism starting age 14 - sober since mid 2019    Myocardial Infarction No family hx of     Cerebrovascular Disease No family hx of     Coronary Artery Disease Early Onset No family hx of     Breast Cancer No family hx of     Colon Cancer No family hx of     Ovarian Cancer No family hx of        Past/family/social history reviewed and no changes    PHYSICAL EXAMINATION:  Constitutional: aaox3, cooperative, pleasant, not dyspneic/diaphoretic, no acute distress  Vitals reviewed: /85 (BP Location: Left arm, Patient Position: Sitting, Cuff Size:  Adult Large)   Pulse 97   Wt 119.6 kg (263 lb 9.6 oz)   SpO2 99%   BMI 38.93 kg/m    Wt:   Wt Readings from Last 2 Encounters:   03/27/24 119.3 kg (263 lb)   03/18/24 120.4 kg (265 lb 6.4 oz)      Eyes: Sclera anicteric/injected  Respiratory: Unlabored breathing  Abd: surgical scars are clean, dry, and intact.  Skin: warm, perfused, no jaundice  Psych: Normal affect  MSK: Normal gait

## 2024-06-20 SDOH — HEALTH STABILITY: PHYSICAL HEALTH: ON AVERAGE, HOW MANY DAYS PER WEEK DO YOU ENGAGE IN MODERATE TO STRENUOUS EXERCISE (LIKE A BRISK WALK)?: 5 DAYS

## 2024-06-20 SDOH — HEALTH STABILITY: PHYSICAL HEALTH: ON AVERAGE, HOW MANY MINUTES DO YOU ENGAGE IN EXERCISE AT THIS LEVEL?: 30 MIN

## 2024-06-20 ASSESSMENT — SOCIAL DETERMINANTS OF HEALTH (SDOH): HOW OFTEN DO YOU GET TOGETHER WITH FRIENDS OR RELATIVES?: TWICE A WEEK

## 2024-06-20 NOTE — COMMUNITY RESOURCES LIST (ENGLISH)
June 20, 2024           YOUR PERSONALIZED LIST OF SERVICES & PROGRAMS           NAVIGATION    Eligibility Screening      [facility] application assistance - [facility]  [address] [place] [address] (Distance: 5.0 miles)  Language: English  Fee: Free      [facility] insurance application assistance - [address] [address] (Distance: 5.0 miles)  Language: English  Fee: Free      [employer] [place] - SNAP (formerly food stamps) Screening and Application help  Phone: (201) [phone]  Website: https://www.[employer].org/programs/[place]-food-helpline/  Language: English  Hours: Mon 10:00 AM - 5:00 PM Tue 10:00 AM - 5:00 PM Wed 10:00 AM - 5:00 PM Thu 10:00 AM - 5:00 PM Fri 10:00 AM - 5:00 PM  Fee: Free  Accessibility: Ada accessible, Blind accommodation, Deaf or hard of hearing, Translation services        ASSISTANCE    Nutrition Benefits      [facility] - [facility] application assistance - [facility]  [address] [place] [address] (Distance: 5.0 miles)  Language: English  Fee: Free      [facility] application assistance [facility]  [address] [place] [address] (Distance: 5.0 miles)  Language: English  Fee: Free      [employer] [place] - SNAP (formerly food stamps) Screening and Application help  Phone: (542) 730-1259  Website: https://www.[employer].org/programs/[place]-food-helpline/  Language: English  Hours: Mon 10:00 AM - 5:00 PM Tue 10:00 AM - 5:00 PM Wed 10:00 AM - 5:00 PM Thu 10:00 AM - 5:00 PM Fri 10:00 AM - 5:00 PM  Fee: Free  Accessibility: Ada accessible, Blind accommodation, Deaf or hard of hearing, Translation services    [facility] - Food Distribution  [address] [place] [address] (Distance: 8.3  miles)  Phone: (752) 267-1768  Language: English, Polish  Fee: Free  Accessibility: Ada accessible      [employer] - [employer]  [address] [place] [address] (Distance: 3.4 miles)  Phone: (978) 127-1832  Website: http://www.Exanet.org/  Language: English, Polish, Nigerien  Fee: Free  Accessibility: Blind accommodation, Deaf or hard of hearing      [employer] - [place] [employer]  Phone: (325) 112-6467  Website: www.[place].[employer]  Language: English, Polish  Hours: Mon 9:00 AM - 3:30 PM Tue 9:00 AM - 6:30 PM Wed 9:00 AM - 3:30 PM Thu 9:00 AM - 12:30 PM Fri 9:00 AM - 12:30 PM Sat 9:00 AM - 12:00 PM  Fee: Free        & SHELTER    Case Management      [employer] - [facility] With Services Independent  [address] [place] [address] (Distance: 6.6 miles)  Phone: [phone]) [phone]  Website: https://www.[employer] Health[employer]arkBox.Pyrolia/contact  Language: English, Polish  Accessibility: Ada accessible, Blind accommodation, Deaf or hard of hearing, Translation services      Living - Housing Support-[facility] (S-I)  [address] [place] [address] (Distance: 7.6 miles)  Phone: (303) [phone]  Website: https://www.Screen  Language: Croatian, English, Tristanian  Fee: [employer]      [employer] - Housing Stabilization Services  Phone: (579) 331-4399  Website: https://homebasemn.com/  Language: English  Hours: Mon 8:00 AM - 4:00 PM Tue 8:00 AM - 4:00 PM Wed 8:00 AM - 4:00 PM Thu 8:00 AM - 4:00 PM Fri 8:00 AM - 4:00 PM  Fee: Free  Accessibility: Blind accommodation, Deaf or hard of hearing  Transportation Options: Free transportation    Payment Assistance      Living - Housing Support-[facility] (S-I)  [address] [place] [address] (Distance: 7.6 miles)  Phone: (803) 824-4613  Website: https://www.Screen  Language: Croatian, English, Tristanian  Fee: Insurance      [employer]  [facility] ([facility] - Emergency Rental Assistance [place]  [address] [place] [address] (Distance: 4.0 miles)  Phone: (260) [phone]  Email: [email or web address]  Website: https://[facility] HospitalApptive/what-we-do/emergency-rental-assistance.html  Language: English  Fee: Free  Accessibility: [place] accessible, Blind accommodation      [employer] - [employer]  Phone: (210) 788-1806  Website: https://www.eid08-mqtnpknvvqkqop.org/programs.html  Language: English  Hours: Mon 7:00 AM - 7:00 PM Tue 7:00 AM - 7:00 PM Wed 7:00 AM - 7:00 PM Thu 7:00 AM - 7:00 PM Fri 7:00 AM - 7:00 PM  Fee: Free    Mediation & Eviction Prevention      [employer] - [facility] [place] [place] [address] (Distance: 7.6 miles)  Phone: (391) 539-9262  Website: https://Simperium/housingcourtinfo  Language: English  Fee: Free      Living - Housing Support-[facility] (HWS-I)  [address] [place] [address] (Distance: 7.6 miles)  Phone: (167) 240-1175  Website: https://ApplyMap  Language: Lao, English, Zambian  Fee: Insurance      Line - Tenant Rights / Eviction Prevention  Website: https://[facility].org/n-xdbt-ou-/  Language: English, Yoruba               IMPORTANT NUMBERS & WEBSITES        Emergency Services  911  .   United Way  211 http://211unitedway.org  .   Poison Control  (543) 934-2305 http://mnpoison.org http://wisconsinpoison.org  .     Suicide and Crisis Lifeline  988 http://988lifeline.org  .   Childhelp National Child Abuse Hotline  770.855.1495 http://Childhelphotline.org   .   National Sexual Assault Hotline  (957) 129-6922 (HOPE) http://Rainn.org   .     National Runaway Safeline  (647) 930-5502 (RUNAWAY) http://[facility] CenternaTrepUp.org  .   Pregnancy & Postpartum Support  Call/text 460-357-9335  [place]: http://ppsupportmn.org  [place]: http://psichapters.com/wi  .   Substance Abuse National Helpline ([facility])  056-597-TGBR (8399)  http://Findtreatment.gov   .                DISCLAIMER: These resources have been generated via the Versly Platform. Versly does not endorse any service providers mentioned in this resource list. [employer] does not guarantee that the services mentioned in this resource list will be available to you or will improve your health or wellness.    [facility]

## 2024-06-23 PROBLEM — K57.92 DIVERTICULITIS: Status: RESOLVED | Noted: 2024-03-27 | Resolved: 2024-06-23

## 2024-06-23 ASSESSMENT — PATIENT HEALTH QUESTIONNAIRE - PHQ9
SUM OF ALL RESPONSES TO PHQ QUESTIONS 1-9: 12
10. IF YOU CHECKED OFF ANY PROBLEMS, HOW DIFFICULT HAVE THESE PROBLEMS MADE IT FOR YOU TO DO YOUR WORK, TAKE CARE OF THINGS AT HOME, OR GET ALONG WITH OTHER PEOPLE: NOT DIFFICULT AT ALL
SUM OF ALL RESPONSES TO PHQ QUESTIONS 1-9: 12

## 2024-06-24 ENCOUNTER — OFFICE VISIT (OUTPATIENT)
Dept: INTERNAL MEDICINE | Facility: CLINIC | Age: 29
End: 2024-06-24
Payer: COMMERCIAL

## 2024-06-24 VITALS
HEIGHT: 69 IN | HEART RATE: 95 BPM | TEMPERATURE: 97.7 F | DIASTOLIC BLOOD PRESSURE: 78 MMHG | OXYGEN SATURATION: 98 % | BODY MASS INDEX: 39.84 KG/M2 | SYSTOLIC BLOOD PRESSURE: 122 MMHG | WEIGHT: 269 LBS

## 2024-06-24 DIAGNOSIS — Z76.89 REFERRAL OF PATIENT: ICD-10-CM

## 2024-06-24 DIAGNOSIS — Z13.1 SCREENING FOR DIABETES MELLITUS: ICD-10-CM

## 2024-06-24 DIAGNOSIS — R73.01 IMPAIRED FASTING GLUCOSE: ICD-10-CM

## 2024-06-24 DIAGNOSIS — G47.33 OSA (OBSTRUCTIVE SLEEP APNEA): ICD-10-CM

## 2024-06-24 DIAGNOSIS — I10 BENIGN ESSENTIAL HYPERTENSION: ICD-10-CM

## 2024-06-24 DIAGNOSIS — Z00.00 ROUTINE HISTORY AND PHYSICAL EXAMINATION OF ADULT: Primary | ICD-10-CM

## 2024-06-24 DIAGNOSIS — Z13.220 SCREENING FOR CHOLESTEROL LEVEL: ICD-10-CM

## 2024-06-24 DIAGNOSIS — Z12.4 SCREENING FOR CERVICAL CANCER: ICD-10-CM

## 2024-06-24 DIAGNOSIS — E66.01 MORBID OBESITY (H): ICD-10-CM

## 2024-06-24 LAB
ALBUMIN SERPL BCG-MCNC: 4.4 G/DL (ref 3.5–5.2)
ALP SERPL-CCNC: 114 U/L (ref 40–150)
ALT SERPL W P-5'-P-CCNC: 19 U/L (ref 0–50)
ANION GAP SERPL CALCULATED.3IONS-SCNC: 10 MMOL/L (ref 7–15)
AST SERPL W P-5'-P-CCNC: 20 U/L (ref 0–45)
BILIRUB SERPL-MCNC: 0.3 MG/DL
BUN SERPL-MCNC: 6.3 MG/DL (ref 6–20)
CALCIUM SERPL-MCNC: 9.2 MG/DL (ref 8.6–10)
CHLORIDE SERPL-SCNC: 104 MMOL/L (ref 98–107)
CHOLEST SERPL-MCNC: 139 MG/DL
CREAT SERPL-MCNC: 0.63 MG/DL (ref 0.51–0.95)
DEPRECATED HCO3 PLAS-SCNC: 23 MMOL/L (ref 22–29)
EGFRCR SERPLBLD CKD-EPI 2021: >90 ML/MIN/1.73M2
FASTING STATUS PATIENT QL REPORTED: YES
FASTING STATUS PATIENT QL REPORTED: YES
GLUCOSE SERPL-MCNC: 93 MG/DL (ref 70–99)
HBA1C MFR BLD: 5.8 % (ref 0–5.6)
HDLC SERPL-MCNC: 43 MG/DL
LDLC SERPL CALC-MCNC: 79 MG/DL
NONHDLC SERPL-MCNC: 96 MG/DL
POTASSIUM SERPL-SCNC: 4.1 MMOL/L (ref 3.4–5.3)
PROT SERPL-MCNC: 8.1 G/DL (ref 6.4–8.3)
SODIUM SERPL-SCNC: 137 MMOL/L (ref 135–145)
TRIGL SERPL-MCNC: 84 MG/DL

## 2024-06-24 PROCEDURE — 99214 OFFICE O/P EST MOD 30 MIN: CPT | Mod: 25 | Performed by: INTERNAL MEDICINE

## 2024-06-24 PROCEDURE — 87624 HPV HI-RISK TYP POOLED RSLT: CPT | Performed by: INTERNAL MEDICINE

## 2024-06-24 PROCEDURE — 36415 COLL VENOUS BLD VENIPUNCTURE: CPT | Performed by: INTERNAL MEDICINE

## 2024-06-24 PROCEDURE — 83036 HEMOGLOBIN GLYCOSYLATED A1C: CPT | Performed by: INTERNAL MEDICINE

## 2024-06-24 PROCEDURE — 80061 LIPID PANEL: CPT | Performed by: INTERNAL MEDICINE

## 2024-06-24 PROCEDURE — 99395 PREV VISIT EST AGE 18-39: CPT | Performed by: INTERNAL MEDICINE

## 2024-06-24 PROCEDURE — 80053 COMPREHEN METABOLIC PANEL: CPT | Performed by: INTERNAL MEDICINE

## 2024-06-24 PROCEDURE — G0145 SCR C/V CYTO,THINLAYER,RESCR: HCPCS | Performed by: INTERNAL MEDICINE

## 2024-06-24 NOTE — PROGRESS NOTES
ASSESSMENT/PLAN                                                       (Z00.00) Routine history and physical examination of adult  (primary encounter diagnosis)  Comment: PMH, PSH, FH, SH, medications, allergies, immunizations, and preventative health measures reviewed and updated as appropriate.  Plan: see below for plans.      (R73.01) Impaired fasting glucose  (Z13.1) Screening for diabetes mellitus  (Z13.220) Screening for cholesterol level  Plan: fasting labs today.     (Z12.4) Screening for cervical cancer  Comment: pap obtained today.    (Z76.89) Referral of patient  Plan: Adult Allergy/Asthma  Referral placed - patient will be contacted to schedule.      (I10) Benign essential hypertension  Comment: well-controlled on current regimen.      (G47.33) BRENDEN (obstructive sleep apnea)  Comment: not on CPAP.    (E66.01) Morbid obesity (H)  Comment: obesity + co-morbidities (HTN and BRENDEN); known issue that I take into account for their medical decisions, no current exacerbations or new concerns.    Melody Echevarria MD   [address] [address]  T: [phone], F: [phone]    SUBJECTIVE                                                      Misty Serrano is a very pleasant 29 year old female who presents for a physical.    ROS:  Constitutional: no unintentional weight loss or gain reported; no fevers, chills, or sweats reported  Cardiovascular: no chest pain, palpitations, or edema reported  Respiratory: no cough, wheezing, shortness of breath, or dyspnea on exertion reported  Gastrointestinal: no nausea, vomiting, constipation, diarrhea, or abdominal pain reported  Genitourinary: no urinary frequency, urgency, dysuria, or hematuria reported  Integumentary: no rash or pruritus reported  Musculoskeletal: no back pain, muscle pain, joint pain, or joint swelling reported  Neurologic: no focal weakness, numbness, or tingling reported  Hematologic: no easy bruising or  bleeding reported  Endocrine: no heat or cold intolerance reported; no polyuria or polydipsia reported  Psychiatric: see PMH below    Past Medical History:   Diagnosis Date    Benign essential hypertension     FRANCES (generalized anxiety disorder)     History of diverticulitis     s/p colectomy    Impaired fasting glucose     Morbid obesity (H)     BRENDEN (obstructive sleep apnea)     NO CPAP    Persistent depressive disorder      Past Surgical History:   Procedure Laterality Date    COLECTOMY, SIGMOID, ROBOT-ASSISTED, LAPAROSCOPIC, USING DA [facility]ARG[facility]ITA XI N/A 03/27/2024    Procedure: Robot-Assisted Sigmoid Colectomy with full mobilization of splenic flexure;  Surgeon: Namrata Evangelista MD;  Location: [facility]    COLONOSCOPY N/A 08/13/2021    Procedure: Colonoscopy, With Polypectomy And Biopsy;  Surgeon: Tejal Jarrett DO;  Location: [facility]    COLONOSCOPY WITH CO2 INSUFFLATION N/A 08/13/2021    Procedure: COLONOSCOPY, WITH CO2 INSUFFLATION;  Surgeon: Tejal Jarrett DO;  Location: [facility]     Family History   Problem Relation Age of Onset    Hypertension Mother     Rheumatoid Arthritis Mother     Hypertension Father     Diabetes Type 2  Father     Alcoholism Sister     Diabetes Type 2  Maternal Grandfather     Coronary Artery Disease Maternal Grandfather         s/p CABG later in life    Prostate Cancer Maternal Grandfather         Recurred 3 times    Skin Cancer Maternal Grandfather     Diabetes Type 2  Paternal Grandfather     Coronary Artery Disease Paternal Grandfather         s/p CABG later in life    Myocardial Infarction No family hx of     Cerebrovascular Disease No family hx of     Coronary Artery Disease Early Onset No family hx of     Breast Cancer No family hx of     Colon Cancer No family hx of     Ovarian Cancer No family hx of      Social History     Occupational History    Occupation: [employer] -    Tobacco Use    Smoking status: Never    Smokeless tobacco: Never   Vaping Use     Vaping status: Never Used   Substance and Sexual Activity    Alcohol use: Yes     Comment: rare    Drug use: Never    Sexual activity: Yes     Birth control/protection: Implant   Social History Narrative    .    No kids.    Walking 30 minutes/day.      No Known Allergies    Current Outpatient Medications   Medication Sig    acetaminophen (TYLENOL) 500 MG tablet Take 2 tablets (1,000 mg) by mouth every 6 hours as needed for pain    amLODIPine-valsartan (EXFORGE) 5-160 MG tablet Take 1 tablet by mouth daily    etonogestrel (NEXPLANON) 68 MG IMPL     fexofenadine (ALLEGRA) 180 MG tablet Take 180 mg by mouth daily as needed (SEASONAL ALLERGIES)    hyoscyamine (LEVSIN) 0.125 MG tablet TAKE 1 TABLET(125 MCG) BY MOUTH EVERY 6 HOURS AS NEEDED FOR CRAMPING     Immunization History   Administered Date(s) Administered    COVID-19 12+ (2023-24) (Pfizer) 11/10/2023    COVID-19 MONOVALENT 12+ (Pfizer) 04/19/2021, 05/10/2021, 01/14/2022    DTaP, Unspecified 05/08/2006    HIB, Unspecified 1994, 1994, 1994    HPV9 07/22/2020    HepB, Unspecified 1994    Historical DTP/aP 1994, 1994, 1994, 09/14/1995, 03/08/1999    Influenza Vaccine >6 months,quad, PF 02/06/2024    MMR 06/02/1995, 03/08/1999    Polio, Unspecified 1994, 1994, 09/14/1995, 03/08/1999    TDAP (Adacel,Boostrix) 05/08/2006    Td (Adult), Adsorbed 07/22/2020    Td, Absorbed, Pf, Adult, Lf Unspecified 07/22/2020    Varicella 06/02/1995     PREVENTATIVE HEALTH                                                      BMI: obese  Blood pressure: well-controlled on current regimen   Breast CA screening: not medically indicated at this time   Cervical CA screening: DUE  Colon CA screening: not medically indicated at this time   Lung CA screening: n/a   Dexa: not medically indicated at this time   Screening cholesterol: DUE  Screening diabetes: DUE  STD testing: no risk factors present  Alcohol misuse screening: alcohol use  "reviewed - no intervention indicated at this time  Immunizations: reviewed;  Prevnar 20 DUE    OBJECTIVE                                                      /78   Pulse 95   Temp 97.7  F (36.5  C) (Temporal)   Ht 1.753 m (5' 9\")   Wt 122 kg (269 lb)   SpO2 98%   BMI 39.72 kg/m    Constitutional: well-appearing  Head, Ears, and Eyes: normocephalic; normal external auditory canal and pinna; tympanic membranes visualized and normal; normal lids and conjunctivae  Neck: supple, symmetric, no thyromegaly or lymphadenopathy  Respiratory: normal respiratory effort; clear to auscultation bilaterally  Cardiovascular: regular rate and rhythm; no edema  Gastrointestinal: soft, non-tender, and non-distended; no organomegaly or masses  Genitourinary: external genitalia, urethral meatus, and vagina normal; cervix visualized and normal in appearance  Musculoskeletal: normal gait and station  Psych: normal judgment and insight; normal mood and affect; recent and remote memory intact    ---  (Note was completed, in part, with smartclip voice-recognition software. Documentation was reviewed, but some grammatical, spelling, and word errors may remain.)    "

## 2024-06-25 LAB
HPV HR 12 DNA CVX QL NAA+PROBE: NEGATIVE
HPV16 DNA CVX QL NAA+PROBE: NEGATIVE
HPV18 DNA CVX QL NAA+PROBE: NEGATIVE
HUMAN PAPILLOMA VIRUS FINAL DIAGNOSIS: NORMAL

## 2024-06-28 LAB
BKR LAB AP GYN ADEQUACY: NORMAL
BKR LAB AP GYN INTERPRETATION: NORMAL
BKR LAB AP PREVIOUS ABNORMAL: NORMAL
PATH REPORT.COMMENTS IMP SPEC: NORMAL
PATH REPORT.COMMENTS IMP SPEC: NORMAL
PATH REPORT.RELEVANT HX SPEC: NORMAL

## 2024-08-16 ENCOUNTER — LAB (OUTPATIENT)
Dept: LAB | Facility: CLINIC | Age: 29
End: 2024-08-16
Payer: COMMERCIAL

## 2024-08-16 ENCOUNTER — OFFICE VISIT (OUTPATIENT)
Dept: ALLERGY | Facility: CLINIC | Age: 29
End: 2024-08-16
Payer: COMMERCIAL

## 2024-08-16 VITALS — OXYGEN SATURATION: 98 % | HEART RATE: 84 BPM | BODY MASS INDEX: 41.79 KG/M2 | WEIGHT: 283 LBS

## 2024-08-16 DIAGNOSIS — J31.0 CHRONIC RHINITIS: ICD-10-CM

## 2024-08-16 DIAGNOSIS — Z76.89 REFERRAL OF PATIENT: ICD-10-CM

## 2024-08-16 DIAGNOSIS — J31.0 CHRONIC RHINITIS: Primary | ICD-10-CM

## 2024-08-16 DIAGNOSIS — L50.3 DERMATOGRAPHIA: ICD-10-CM

## 2024-08-16 PROCEDURE — 86003 ALLG SPEC IGE CRUDE XTRC EA: CPT

## 2024-08-16 PROCEDURE — 36415 COLL VENOUS BLD VENIPUNCTURE: CPT

## 2024-08-16 PROCEDURE — 82785 ASSAY OF IGE: CPT

## 2024-08-16 PROCEDURE — 99203 OFFICE O/P NEW LOW 30 MIN: CPT

## 2024-08-16 NOTE — LETTER
8/16/2024      Misty Serrano  [address]  [facility] [address]      Dear Colleague,    Thank you for referring your patient, Misty Serrano, to the [facility]. Please see a copy of my visit note below.    Misty Serrano was seen in the Allergy Clinic at [facility].    Misty Serrano is a 29 year old female  being seen today for ongoing evaluation of environmental allergies.  Referral fromWale Cassandra R, MD [facility] INTERNAL MEDICINE       History of Present Illness:     Rhinitis    Perennial/seasonally-exacerbated (Summer and Perennial) .  Denies ocular symptoms (itching, redness, watering, and burning).  There is no history of PE tubes, sinus surgeries, or tonsillectomy/adenoidectomy.   Patient reports she does have a poor sense of smell, she is able to smell in general, this is not particularly bothered to some to her.     Patient reports when [place] is [place] in the [facility] she does have significant allergy symptoms, she often feels like she is ill when she is outside during this time period, typical symptoms are during the early summer months.  [facility] New Allergy And Asthma    Question 8/10/2024 12:12 AM CDT - Filed by Patient   Reason for visit: Nasal or sinus symptoms   Nasal/ Sinus/ Eye Symptoms    When did your symptoms begin? Early age/childhood   What symptoms do you have? Sneezing    Runny nose    Stuffy nose    Sinus pressure    Drainage down the throat    Poor sense of smell    Headaches   Any prior sinus surgeries? No   History of nasal polyps? No   History of sinus infections? No   Have you tried pills/oral medications? Yes   Which ones? Zyrtec, Allegra   Have you tried nasal sprays? Yes   Which ones? Nasacort, Mucinex   Have you used eye drops? No   When do symptoms occur? Spring    Fall    Year-round    Indoors    Outdoors   What makes symptoms worse? Dust    Temperature changes    Smoke     Scents/perfumes   Environmental and Social History    Place of Residence: Apartment   Do you have Central Air Conditioning? No   Type of Heating System: Forced air   Wood burning stove or fireplace: No   Occupation:    Pets: Yes   Number and type of pets: 2 cats   Do you smoke cigarettes: No   Do you use an e-cigarette or vape? No   Does anyone living in your home smoke or vape? No   Family History    Do your parents, siblings, or children have asthma? No   Do your parents, siblings, or children have environmental allergies? Yes   Who? Mom and Dad   Do your parents, siblings, or children have food allergies? No   Do your parents, siblings, or children have eczema? Yes   Who? Mom and sister       Past Medical History:   Diagnosis Date     Benign essential hypertension      FRANCES (generalized anxiety disorder)      History of diverticulitis     s/p colectomy     Impaired fasting glucose      Morbid obesity (H)      BRENDEN (obstructive sleep apnea)     NO CPAP     Persistent depressive disorder        Past Surgical History:   Procedure Laterality Date     COLECTOMY, SIGMOID, ROBOT-ASSISTED, LAPAROSCOPIC, USING DA [employer] XI N/A 03/27/2024    Procedure: Robot-Assisted Sigmoid Colectomy with full mobilization of splenic flexure;  Surgeon: Namrata Evangelista MD;  [facility]     COLONOSCOPY N/A 08/13/2021    Procedure: Colonoscopy, With Polypectomy And Biopsy;  Surgeon: Tejal Jarrett DO;  [facility]: [facility]     COLONOSCOPY WITH CO2 INSUFFLATION N/A 08/13/2021    Procedure: COLONOSCOPY, WITH CO2 INSUFFLATION;  Surgeon: Tejal Jarrett DO;  Location: [facility] OR         Current Outpatient Medications:      acetaminophen (TYLENOL) 500 MG tablet, Take 2 tablets (1,000 mg) by mouth every 6 hours as needed for pain, Disp: 40 tablet, Rfl: 0     amLODIPine-valsartan (EXFORGE) 5-160 MG tablet, Take 1 tablet by mouth daily, Disp: 90 tablet, Rfl: 3     etonogestrel (NEXPLANON) 68 MG IMPL, , Disp: , Rfl:       fexofenadine (ALLEGRA) 180 MG tablet, Take 180 mg by mouth daily as needed (SEASONAL ALLERGIES), Disp: , Rfl:      hyoscyamine (LEVSIN) 0.125 MG tablet, TAKE 1 TABLET(125 MCG) BY MOUTH EVERY 6 HOURS AS NEEDED FOR CRAMPING, Disp: 30 tablet, Rfl: 1  No Known Allergies      Family History   Problem Relation Age of Onset     Hypertension Mother      Rheumatoid Arthritis Mother      Hypertension Father      Diabetes Type 2  Father      Alcoholism Sister      Diabetes Type 2  Maternal Grandfather      Coronary Artery Disease Maternal Grandfather         s/p CABG later in life     Prostate Cancer Maternal Grandfather         Recurred 3 times     Skin Cancer Maternal Grandfather      Diabetes Type 2  Paternal Grandfather      Coronary Artery Disease Paternal Grandfather         s/p CABG later in life     Myocardial Infarction No family hx of      Cerebrovascular Disease No family hx of      Coronary Artery Disease Early Onset No family hx of      Breast Cancer No family hx of      Colon Cancer No family hx of      Ovarian Cancer No family hx of          EXAM:   There were no vitals taken for this visit.    Physical Exam  Constitutional:       General: She is not in acute distress.     Appearance: Normal appearance. She is not ill-appearing.   HENT:      Nose: Nose normal. No nasal deformity, septal deviation, congestion or rhinorrhea.      Right Turbinates: Not enlarged, swollen or pale.      Left Turbinates: Not enlarged, swollen or pale.      Mouth/Throat:      Mouth: Mucous membranes are moist.      Pharynx: Oropharynx is clear. Uvula midline. No posterior oropharyngeal erythema.      Tonsils: 0 on the right. 0 on the left.   Eyes:      General: No allergic shiner.        Right eye: No discharge.         Left eye: No discharge.      Conjunctiva/sclera: Conjunctivae normal.   Cardiovascular:      Rate and Rhythm: Normal rate and regular rhythm.      Heart sounds: Normal heart sounds, S1 normal and S2 normal.   Pulmonary:       Effort: Pulmonary effort is normal. No prolonged expiration.      Breath sounds: Normal breath sounds. No decreased air movement. No wheezing.   Skin:     Comments: Patient has rubbed her forearm, developed erythema and urticarial-like rash secondary to pressure on her skin.   Neurological:      Mental Status: She is alert.   Psychiatric:         Mood and Affect: Mood normal.         Behavior: Behavior normal.         Judgment: Judgment normal.         WORKUP: IgE laboratory testing, patient has dermatographia, with erythema/raised hive when patient rubs her skin in clinic today.    ASSESSMENT/PLAN:  Misty Serrano is a 29 year old female who is under evaluation for chronic rhinitis, and concerns for possible environmental allergies.    Chronic rhinitis  IgE laboratory testing for environmental allergens we will be in touch via [employer] with these results.     Azelastine (Astepro ) nasal spray, 1-2 sprays in each nostril twice daily as needed.  This medication may be better taste, please consider brushing your teeth after using this nasal spray.     If you feel like azelastine nasal spray is not adequately controlling your nasal symptoms, start intranasal fluticasone (Flonase) 1-2 sprays in each nostril once daily.     Cetirizine (Zyrtec) 10 mg, levocetirizine (Xyzal) 5 mg, or fexofenadine (Allegra) 180 mg    Pataday (olapatadine) 1 drop/eye daily as needed.      Patient was given instructions for nasal saline irrigation, 1-2 times daily as needed.    If nasal saline irrigation is not tolerated, nasal saline sprays could be beneficial to remove allergens from the mucous membrane.    Patient was given instructions for environmental modifications techniques.     Patient was instructed that if she potentially fails this treatment plan, she could potentially be a candidate for allergy immunotherapy, patient was given very brief description of allergy immunotherapy, patient was not interested in further information  at this time.  She will reach out if she would like more information regarding SCIT.     Follow-up as needed    Thank you for allowing me to participate in the care of Misty Serrano.    I spent 40 minutes on the date of the encounter doing chart review, history and exam, documentation and further coordination as noted above exclusive of separately reported interpretations.       Please note that this note consists of symbols derived from keyboarding, dictation and/or voice recognition software. As a result, there may be errors in the script that have gone undetected. Please consider this when interpreting information found in this chart.     Krupa Hayward PA-C  [facility]      Again, thank you for allowing me to participate in the care of your patient.        Sincerely,        Krupa Hayward PA-C

## 2024-08-16 NOTE — PROGRESS NOTES
Misty Serrano was seen in the Allergy Clinic at [facility].    Misty Serrano is a 29 year old female  being seen today for ongoing evaluation of environmental allergies.  Referral from,     Melody Echevarria MD [facility] INTERNAL MEDICINE       History of Present Illness:     Rhinitis    Perennial/seasonally-exacerbated (Summer and Perennial) .  Denies ocular symptoms (itching, redness, watering, and burning).  There is no history of PE tubes, sinus surgeries, or tonsillectomy/adenoidectomy.   Patient reports she does have a poor sense of smell, she is able to smell in general, this is not particularly bothered to some to her.     Patient reports when she is up north in the [place] area she does have significant allergy symptoms, she often feels like she is ill when she is outside during this time period, typical symptoms are during the early summer months.  [facility] New Allergy And Asthma    Question 8/10/2024 12:12 AM CDT - Filed by Patient   Reason for visit: Nasal or sinus symptoms   Nasal/ Sinus/ Eye Symptoms    When did your symptoms begin? Early age/childhood   What symptoms do you have? Sneezing    Runny nose    Stuffy nose    Sinus pressure    Drainage down the throat    Poor sense of smell    Headaches   Any prior sinus surgeries? No   History of nasal polyps? No   History of sinus infections? No   Have you tried pills/oral medications? Yes   Which ones? Zyrtec, Allegra   Have you tried nasal sprays? Yes   Which ones? Nasacort, Mucinex   Have you used eye drops? No   When do symptoms occur? Spring    Fall    Year-round    Indoors    Outdoors   What makes symptoms worse? Dust    Temperature changes    Smoke    Scents/perfumes   Environmental and Social History    Place of Residence: Apartment   Do you have Central Air Conditioning? No   Type of Heating System: Forced air   Wood burning stove or fireplace: No   Occupation:    Pets: Yes    Number and type of pets: 2 cats   Do you smoke cigarettes: No   Do you use an e-cigarette or vape? No   Does anyone living in your home smoke or vape? No   Family History    Do your parents, siblings, or children have asthma? No   Do your parents, siblings, or children have environmental allergies? Yes   Who? Mom and Dad   Do your parents, siblings, or children have food allergies? No   Do your parents, siblings, or children have eczema? Yes   Who? Mom and sister       Past Medical History:   Diagnosis Date    Benign essential hypertension     FRANCES (generalized anxiety disorder)     History of diverticulitis     s/p colectomy    Impaired fasting glucose     Morbid obesity (H)     BRENDEN (obstructive sleep apnea)     NO CPAP    Persistent depressive disorder        Past Surgical History:   Procedure Laterality Date    COLECTOMY, SIGMOID, ROBOT-ASSISTED, LAPAROSCOPIC, USING DA MARGARITA XI N/A 03/27/2024    Procedure: Robot-Assisted Sigmoid Colectomy with full mobilization of splenic flexure;  Surgeon: Namrata Evangelista MD;  Location: [facility]    COLONOSCOPY N/A 08/13/2021    Procedure: Colonoscopy, With Polypectomy And Biopsy;  Surgeon: Tejal Jarrett DO;  Location: [facility]    COLONOSCOPY WITH CO2 INSUFFLATION N/A 08/13/2021    Procedure: COLONOSCOPY, WITH CO2 INSUFFLATION;  Surgeon: Tejal Jarrett DO;  Location: [facility] OR         Current Outpatient Medications:     acetaminophen (TYLENOL) 500 MG tablet, Take 2 tablets (1,000 mg) by mouth every 6 hours as needed for pain, Disp: 40 tablet, Rfl: 0    amLODIPine-valsartan (EXFORGE) 5-160 MG tablet, Take 1 tablet by mouth daily, Disp: 90 tablet, Rfl: 3    etonogestrel (NEXPLANON) 68 MG IMPL, , Disp: , Rfl:     fexofenadine (ALLEGRA) 180 MG tablet, Take 180 mg by mouth daily as needed (SEASONAL ALLERGIES), Disp: , Rfl:     hyoscyamine (LEVSIN) 0.125 MG tablet, TAKE 1 TABLET(125 MCG) BY MOUTH EVERY 6 HOURS AS NEEDED FOR CRAMPING, Disp: 30 tablet, Rfl: 1  No Known  Allergies      Family History   Problem Relation Age of Onset    Hypertension Mother     Rheumatoid Arthritis Mother     Hypertension Father     Diabetes Type 2  Father     Alcoholism Sister     Diabetes Type 2  Maternal Grandfather     Coronary Artery Disease Maternal Grandfather         s/p CABG later in life    Prostate Cancer Maternal Grandfather         Recurred 3 times    Skin Cancer Maternal Grandfather     Diabetes Type 2  Paternal Grandfather     Coronary Artery Disease Paternal Grandfather         s/p CABG later in life    Myocardial Infarction No family hx of     Cerebrovascular Disease No family hx of     Coronary Artery Disease Early Onset No family hx of     Breast Cancer No family hx of     Colon Cancer No family hx of     Ovarian Cancer No family hx of          EXAM:   There were no vitals taken for this visit.    Physical Exam  Constitutional:       General: She is not in acute distress.     Appearance: Normal appearance. She is not ill-appearing.   HENT:      Nose: Nose normal. No nasal deformity, septal deviation, congestion or rhinorrhea.      Right Turbinates: Not enlarged, swollen or pale.      Left Turbinates: Not enlarged, swollen or pale.      Mouth/Throat:      Mouth: Mucous membranes are moist.      Pharynx: Oropharynx is clear. Uvula midline. No posterior oropharyngeal erythema.      Tonsils: 0 on the right. 0 on the left.   Eyes:      General: No allergic shiner.        Right eye: No discharge.         Left eye: No discharge.      Conjunctiva/sclera: Conjunctivae normal.   Cardiovascular:      Rate and Rhythm: Normal rate and regular rhythm.      Heart sounds: Normal heart sounds, S1 normal and S2 normal.   Pulmonary:      Effort: Pulmonary effort is normal. No prolonged expiration.      Breath sounds: Normal breath sounds. No decreased air movement. No wheezing.   Skin:     Comments: Patient has rubbed her forearm, developed erythema and urticarial-like rash secondary to pressure on her  skin.   Neurological:      Mental Status: She is alert.   Psychiatric:         Mood and Affect: Mood normal.         Behavior: Behavior normal.         Judgment: Judgment normal.         WORKUP: IgE laboratory testing, patient has dermatographia, with erythema/raised hive when patient rubs her skin in clinic today.    ASSESSMENT/PLAN:  Misty Serrano is a 29 year old female who is under evaluation for chronic rhinitis, and concerns for possible environmental allergies.    Chronic rhinitis  IgE laboratory testing for environmental allergens we will be in touch via [employer] with these results.     Azelastine (Astepro ) nasal spray, 1-2 sprays in each nostril twice daily as needed.  This medication may be better taste, please consider brushing your teeth after using this nasal spray.     If you feel like azelastine nasal spray is not adequately controlling your nasal symptoms, start intranasal fluticasone (Flonase) 1-2 sprays in each nostril once daily.     Cetirizine (Zyrtec) 10 mg, levocetirizine (Xyzal) 5 mg, or fexofenadine (Allegra) 180 mg    Pataday (olapatadine) 1 drop/eye daily as needed.      Patient was given instructions for nasal saline irrigation, 1-2 times daily as needed.    If nasal saline irrigation is not tolerated, nasal saline sprays could be beneficial to remove allergens from the mucous membrane.    Patient was given instructions for environmental modifications techniques.     Patient was instructed that if she potentially fails this treatment plan, she could potentially be a candidate for allergy immunotherapy, patient was given very brief description of allergy immunotherapy, patient was not interested in further information at this time.  She will reach out if she would like more information regarding SCIT.     Follow-up as needed    Thank you for allowing me to participate in the care of Misty Serrano.    I spent 40 minutes on the date of the encounter doing chart review, history and exam,  documentation and further coordination as noted above exclusive of separately reported interpretations.       Please note that this note consists of symbols derived from keyboarding, dictation and/or voice recognition software. As a result, there may be errors in the script that have gone undetected. Please consider this when interpreting information found in this chart.     SUHAIL Tim [facility]

## 2024-08-16 NOTE — PATIENT INSTRUCTIONS
Azelastine (Astepro ) nasal spray, 1-2 sprays in each nostril twice daily as needed.  This medication may be better taste, please consider brushing your teeth after using this nasal spray.     If you feel like azelastine nasal spray is not adequately controlling your nasal symptoms, start intranasal fluticasone (Flonase) 1-2 sprays in each nostril once daily.     Cetirizine (Zyrtec) 10 mg, levocetirizine (Xyzal) 5 mg, or fexofenadine (Allegra) 180 mg    NASAL SALINE IRRIGATION INSTRUCTIONS     You will be starting nasal saline irrigations and will need to obtain the following:       - NeilMed Sinus Rinse 8 oz Kit (or prefer device)    - Distilled or filtered water   - Normal saline salt packets     Place filtered or distilled water into the NeilMed bottle up to the fill line (DO NOT USE TAP OR WELL WATER).  Place the pre-made salt packet in the 8 oz of saline.  Shake the bottle to suspend into solution.  Lean head forward over a sink or a basin.  Rinse each side of the nose with one-half of the bottle (each squeeze is about one-half of the bottle). Rinse the nose daily.      If you use topical nasal sprays, apply following irrigation.     Video example: https://www.youtube.com/watch?[email or web address]      SINUS SALINE RINSE RECIPE    This can be completed 1-2 times daily, or as needed    Ingredients  1.  Pickling or madeline salt-containing no iodide, anti-caking agents or preservatives. Do not use other salt such as table salt as these can be irritating to the nasal lining  2.  Baking soda  3.  8 ounces (1 cup) of lukewarm distilled or boiled water    In a clean container, mix 3 teaspoons of iodide-free salt with 1 teaspoon of baking soda and store in a small airtight container. Add 1 teaspoon of the mixture to 8 ounces (1 cup) of lukewarm distilled or boiled water. Use less dry ingredients to make a weaker solution if burning or stinging is experienced.    Notes - if you are boiling tap water, cool water prior to use  to prevent injury.   - do not use tap water unless it has been sterilized by boiling water prior to use.       If nasal saline irrigation is not tolerated, nasal saline sprays could be beneficial to remove allergens from the mucous membrane.    AEROALLERGEN AVOIDANCE INSTRUCTIONS  MOLD  Indoors, mold season is year round. Outdoors, most mold prefer seasons with high humidity. Mold prefers damp, dark, warm places. Here are some tips on how to avoid mold exposure.   Keep humidity inside between 35-50% with air conditioning or dehumidifier. The humidity level can be checked with a meter from a hardware store.    Clean surfaces where mold grows and dry wet areas.   Avoid steam cleaning carpets and discard moldy belongings.   Wear a mask when doing yard work and refrain from walking through uncut fields or playing in leaves.   Minimize use of potted plants and do not keep them indoors.   Consider an allergy cover for the pillow and mattress.  POLLEN  Pollens are the tiny airborne particles given off by trees, weeds, and grasses. They can be the cause of seasonal allergic rhinitis or hay fever symptoms, which include stuffy, itchy, runny nose, redness, swelling and itching of the eyes, and itching of the ears and throat. Here are some tips on how to avoid pollen exposure.  .Keep windows closed and use the air conditioner when possible.   Avoid outside exposure in the early morning as pollen counts are highest at that time.   Take a shower and wash hair each night.   Consider wearing a mask when working in the yard and/or garden.   Clean furnace filter monthly with HEPA filters. Consider a HEPA filter vacuum  which will prevent pollen from being reintroduced into the air.   DUST MITES  Dust mites can never be entirely eliminated in the house no matter how clean your house is. Dust mites are attracted to warm, moist areas and feed on dead skin flakes. Here are tips to minimize dust mites in your home.   Encase  pillows and mattress/box springs in zippered allergy covers.   Wash bedding in hot water (at least 130 F) every 7-14 days.   Avoid curtains, carpet, and upholstered furniture if possible.   Use HEPA air filters and a HEPA filter vacuum . Change filters monthly. Vacuum weekly.   Keep bedroom simple, avoiding clutter, so it can quickly be dusted.   Cover heating vents with vent filters.   Keep stuffed toys in a closed container and wash or freeze regularly.   Keep clothing in the closet with the door closed.  PETS  Pets present many problems for people with allergies. Dander from pets is very difficult to remove and also is a food source for dust mites.   If possible, find the pet a new home.   If not possible, keep the pet outdoors. Never allow the pet into the bedroom.   Wash pet weekly in warm water.   Encase mattresses, pillows, and box springs in allergen-proof covers.   Use HEPA air filters and a HEPA filter vacuum . Change filters monthly.     Common seasonal allergens:  Tree pollens ? Early to late spring  Grass pollens ? Late spring to early summer  Weed pollens ? Early summer through fall  Molds ? Present year round in many climates, but significant primarily when it is warm, humid and wet  Common perennial (year?round) allergens:  Dust mites ? a very common indoor allergen that causes significant allergy in most of the [place]; highest allergen levels are in the bed  Pet dander ? cat and dog are the most common, but anything with fur or hair can cause allergies. Immunotherapy has only been shown to work well for cat and dog.  Cockroach ? Very common and potent allergen in inner cities. Many people have positive skin tests because they ve been exposed to the allergen in old buildings, even if there isn t a current infestation

## 2024-08-18 LAB
A ALTERNATA IGE QN: <0.1 KU(A)/L
A FUMIGATUS IGE QN: <0.1 KU(A)/L
C HERBARUM IGE QN: <0.1 KU(A)/L
CALIF WALNUT POLN IGE QN: <0.1 KU(A)/L
CAT DANDER IGG QN: <0.1 KU(A)/L
CEDAR IGE QN: <0.1 KU(A)/L
COMMON RAGWEED IGE QN: <0.1 KU(A)/L
COTTONWOOD IGE QN: <0.1 KU(A)/L
D FARINAE IGE QN: <0.1 KU(A)/L
D PTERONYSS IGE QN: <0.1 KU(A)/L
DOG DANDER+EPITH IGE QN: <0.1 KU(A)/L
E PURPURASCENS IGE QN: <0.1 KU(A)/L
EAST WHITE PINE IGE QN: <0.1 KU(A)/L
ENGL PLANTAIN IGE QN: <0.1 KU(A)/L
FIREBUSH IGE QN: <0.1 KU(A)/L
GIANT RAGWEED IGE QN: <0.1 KU(A)/L
GOOSEFOOT IGE QN: <0.1 KU(A)/L
JOHNSON GRASS IGE QN: 0.49 KU(A)/L
MAPLE IGE QN: 0.67 KU(A)/L
MUGWORT IGE QN: <0.1 KU(A)/L
NETTLE IGE QN: <0.1 KU(A)/L
P NOTATUM IGE QN: <0.1 KU(A)/L
RED MULBERRY IGE QN: <0.1 KU(A)/L
SALTWORT IGE QN: <0.1 KU(A)/L
SHEEP SORREL IGE QN: <0.1 KU(A)/L
SILVER BIRCH IGE QN: <0.1 KU(A)/L
TIMOTHY IGE QN: 0.53 KU(A)/L
WHITE ASH IGE QN: <0.1 KU(A)/L
WHITE ELM IGE QN: <0.1 KU(A)/L
WHITE MULBERRY IGE QN: <0.1 KU(A)/L
WHITE OAK IGE QN: <0.1 KU(A)/L
WORMWOOD IGE QN: <0.1 KU(A)/L

## 2024-08-19 LAB — IGE SERPL-ACNC: 483 KU/L (ref 0–114)

## 2024-09-06 DIAGNOSIS — I10 BENIGN ESSENTIAL HYPERTENSION: ICD-10-CM

## 2024-09-06 RX ORDER — AMLODIPINE AND VALSARTAN 5; 160 MG/1; MG/1
1 TABLET ORAL DAILY
Qty: 90 TABLET | Refills: 3 | OUTPATIENT
Start: 2024-09-06

## 2024-11-13 DIAGNOSIS — I10 BENIGN ESSENTIAL HYPERTENSION: ICD-10-CM

## 2024-11-13 RX ORDER — AMLODIPINE AND VALSARTAN 5; 160 MG/1; MG/1
1 TABLET ORAL DAILY
Qty: 90 TABLET | Refills: 0 | Status: SHIPPED | OUTPATIENT
Start: 2024-11-13

## 2024-12-07 ENCOUNTER — MYC REFILL (OUTPATIENT)
Dept: INTERNAL MEDICINE | Facility: CLINIC | Age: 29
End: 2024-12-07
Payer: COMMERCIAL

## 2024-12-07 DIAGNOSIS — I10 BENIGN ESSENTIAL HYPERTENSION: ICD-10-CM

## 2024-12-09 RX ORDER — AMLODIPINE AND VALSARTAN 5; 160 MG/1; MG/1
1 TABLET ORAL DAILY
Qty: 90 TABLET | Refills: 0 | OUTPATIENT
Start: 2024-12-09

## 2025-03-05 DIAGNOSIS — I10 BENIGN ESSENTIAL HYPERTENSION: ICD-10-CM

## 2025-03-05 RX ORDER — AMLODIPINE AND VALSARTAN 5; 160 MG/1; MG/1
1 TABLET ORAL DAILY
Qty: 90 TABLET | Refills: 0 | Status: SHIPPED | OUTPATIENT
Start: 2025-03-05

## 2025-04-28 DIAGNOSIS — H65.03 NON-RECURRENT ACUTE SEROUS OTITIS MEDIA OF BOTH EARS: ICD-10-CM

## 2025-04-28 RX ORDER — FLUTICASONE PROPIONATE 50 MCG
1 SPRAY, SUSPENSION (ML) NASAL 2 TIMES DAILY
Qty: 16 G | Refills: 0 | Status: SHIPPED | OUTPATIENT
Start: 2025-04-28

## 2025-05-04 ENCOUNTER — MYC REFILL (OUTPATIENT)
Dept: INTERNAL MEDICINE | Facility: CLINIC | Age: 30
End: 2025-05-04
Payer: COMMERCIAL

## 2025-05-04 DIAGNOSIS — R10.9 ABDOMINAL CRAMPING: ICD-10-CM

## 2025-05-05 RX ORDER — HYOSCYAMINE SULFATE 0.12 MG/1
TABLET ORAL
Qty: 30 TABLET | Refills: 1 | Status: SHIPPED | OUTPATIENT
Start: 2025-05-05

## 2025-06-05 DIAGNOSIS — I10 BENIGN ESSENTIAL HYPERTENSION: ICD-10-CM

## 2025-06-05 RX ORDER — AMLODIPINE AND VALSARTAN 5; 160 MG/1; MG/1
1 TABLET ORAL DAILY
Qty: 90 TABLET | Refills: 0 | Status: SHIPPED | OUTPATIENT
Start: 2025-06-05

## (undated) DEVICE — DAVINCI XI DRAPE COLUMN 470341

## (undated) DEVICE — GLOVE BIOGEL PI MICRO SZ 6.0 48560

## (undated) DEVICE — BLADE KNIFE SURG 10 371110

## (undated) DEVICE — SUCTION TIP YANKAUER W/O VENT K86

## (undated) DEVICE — DAVINCI XI FCP BIPOLAR FENESTRATED 470205

## (undated) DEVICE — DRAPE LEGGINGS 8421

## (undated) DEVICE — EVAC SYSTEM CLEAR FLOW SC082500

## (undated) DEVICE — SYR BULB IRRIG DOVER 60 ML LATEX FREE 67000

## (undated) DEVICE — DAVINCI HOT SHEARS TIP COVER  400180

## (undated) DEVICE — SU PROLENE 2-0 SHDA 48" 8533H

## (undated) DEVICE — DRAPE BREAST/CHEST 29420

## (undated) DEVICE — STPL DAVINCI SUREFORM 45MM STR TIPRELOAD 12FIRE 480445

## (undated) DEVICE — KIT ENDO FIRST STEP DISINFECTANT 200ML W/POUCH EP-4

## (undated) DEVICE — PAD CHUX UNDERPAD 23X24" 7136

## (undated) DEVICE — LINEN TOWEL PACK X5 5464

## (undated) DEVICE — SU VICRYL 2-0 SH 27" J317H

## (undated) DEVICE — TUBING SUCTION 12"X1/4" N612

## (undated) DEVICE — SOL NACL 0.9% IRRIG 1000ML BOTTLE 2F7124

## (undated) DEVICE — DRAPE IOBAN INCISE 23X17" 6650EZ

## (undated) DEVICE — SU DERMABOND ADVANCED .7ML DNX12

## (undated) DEVICE — DAVINCI XI HANDPIECE ESU VESSEL SEALER 8MM EXT 480422

## (undated) DEVICE — TUBING CYSTO/BLADDER IRRIG SET 80" 06544-01

## (undated) DEVICE — SOL NACL 0.9% INJ 1000ML BAG 2B1324X

## (undated) DEVICE — NDL COUNTER 40CT  31142311

## (undated) DEVICE — TAPE CLOTH ADHESIVE 3" LATEX FREE

## (undated) DEVICE — DAVINCI XI SEAL UNIVERSAL 5-8MM 470361

## (undated) DEVICE — SU VICRYL 0 TIE 12X18" J906G

## (undated) DEVICE — DAVINCI XI MONOPOLAR SCISSORS HOT SHEARS 8MM 470179

## (undated) DEVICE — Device

## (undated) DEVICE — DAVINCI XI DRAPE ARM 470015

## (undated) DEVICE — ESU GROUND PAD UNIVERSAL W/O CORD

## (undated) DEVICE — SOL WATER IRRIG 1000ML BOTTLE 2F7114

## (undated) DEVICE — SUCTION IRR STRYKERFLOW II W/TIP 250-070-520

## (undated) DEVICE — SU MONOCRYL 4-0 PS-2 18" UND Y496G

## (undated) DEVICE — ENDO TROCAR CONMED AIRSEAL BLADELESS 05X120MM IAS5-120LP

## (undated) DEVICE — PREP CHLORAPREP 26ML TINTED ORANGE  260815

## (undated) DEVICE — ESU LIGASURE LAPAROSCOPIC BLUNT TIP SEALER 5MMX37CM LF1837

## (undated) DEVICE — SUCTION TIP YANKAUER STR K87

## (undated) DEVICE — DAVINCI XI REDUCER 8-12MM 470381

## (undated) DEVICE — KIT PATIENT POSITIONING PIGAZZI LATEX FREE 40580

## (undated) DEVICE — DAVINCI XI NDL DRIVER LARGE 470006

## (undated) DEVICE — PACK LAP CHOLE SLC15LCFSD

## (undated) DEVICE — STPL CIRCULAR 29MM CVD CDH29P

## (undated) DEVICE — SU PDS II 0 CTX 60" Z990G

## (undated) DEVICE — ANTIFOG SOLUTION SEE SHARP 150M TROCAR SWABS 30978

## (undated) DEVICE — NDL INSUFFLATION 13GA 120MM C2201

## (undated) DEVICE — SYR BULB IRRIG 50ML LATEX FREE 0035280

## (undated) DEVICE — GLOVE BIOGEL PI MICRO INDICATOR UNDERGLOVE SZ 6.5 48965

## (undated) DEVICE — DAVINCI XI OBTURATOR BLADELESS 8MM 470359

## (undated) DEVICE — DAVINCI XI GRASPER FENESTRATED TIP UP 8MM 470347

## (undated) DEVICE — DAVINCI SEAL CANNULA AND STPL 12MM 470380

## (undated) DEVICE — STPL DAVINCI SUREFORM 45MM RELOAD GREEN 48345G

## (undated) DEVICE — SPONGE LAP 18X18" X8435

## (undated) DEVICE — NDL COUNTER 10CT

## (undated) DEVICE — WIPES FOLEY CARE SURESTEP PROVON DFC100

## (undated) DEVICE — TUBING CONMED AIRSEAL SMOKE EVAC INSUFFLATION ASM-EVAC

## (undated) DEVICE — ESU PENCIL W/SMOKE EVAC NEPTUNE STRYKER 0703-046-000

## (undated) DEVICE — DEVICE SUTURE PASSER 14GA WECK EFX EFXSP2

## (undated) DEVICE — LUBRICANT INST KIT ENDO-LUBE 220-90

## (undated) DEVICE — KIT SIGMOIDOSCOPE ENDOSCOPIC LIGHTED 18FR KI613/10

## (undated) RX ORDER — HYDROMORPHONE HYDROCHLORIDE 1 MG/ML
INJECTION, SOLUTION INTRAMUSCULAR; INTRAVENOUS; SUBCUTANEOUS
Status: DISPENSED
Start: 2024-03-27

## (undated) RX ORDER — PROPOFOL 10 MG/ML
INJECTION, EMULSION INTRAVENOUS
Status: DISPENSED
Start: 2024-03-27

## (undated) RX ORDER — INDOCYANINE GREEN AND WATER 25 MG
KIT INJECTION
Status: DISPENSED
Start: 2024-03-27

## (undated) RX ORDER — BUPIVACAINE HYDROCHLORIDE 5 MG/ML
INJECTION, SOLUTION EPIDURAL; INTRACAUDAL
Status: DISPENSED
Start: 2024-03-27

## (undated) RX ORDER — CEFAZOLIN SODIUM/WATER 3 G/30 ML
SYRINGE (ML) INTRAVENOUS
Status: DISPENSED
Start: 2024-03-27

## (undated) RX ORDER — ACETAMINOPHEN 325 MG/1
TABLET ORAL
Status: DISPENSED
Start: 2024-03-27

## (undated) RX ORDER — FENTANYL CITRATE 0.05 MG/ML
INJECTION, SOLUTION INTRAMUSCULAR; INTRAVENOUS
Status: DISPENSED
Start: 2024-03-27

## (undated) RX ORDER — FENTANYL CITRATE 50 UG/ML
INJECTION, SOLUTION INTRAMUSCULAR; INTRAVENOUS
Status: DISPENSED
Start: 2021-08-13

## (undated) RX ORDER — KETOROLAC TROMETHAMINE 15 MG/ML
INJECTION, SOLUTION INTRAMUSCULAR; INTRAVENOUS
Status: DISPENSED
Start: 2024-03-27

## (undated) RX ORDER — ONDANSETRON 2 MG/ML
INJECTION INTRAMUSCULAR; INTRAVENOUS
Status: DISPENSED
Start: 2024-03-27

## (undated) RX ORDER — METRONIDAZOLE 500 MG/100ML
INJECTION, SOLUTION INTRAVENOUS
Status: DISPENSED
Start: 2024-03-27

## (undated) RX ORDER — DEXAMETHASONE SODIUM PHOSPHATE 4 MG/ML
INJECTION, SOLUTION INTRA-ARTICULAR; INTRALESIONAL; INTRAMUSCULAR; INTRAVENOUS; SOFT TISSUE
Status: DISPENSED
Start: 2024-03-27

## (undated) RX ORDER — ONDANSETRON 2 MG/ML
INJECTION INTRAMUSCULAR; INTRAVENOUS
Status: DISPENSED
Start: 2021-08-13

## (undated) RX ORDER — FENTANYL CITRATE 50 UG/ML
INJECTION, SOLUTION INTRAMUSCULAR; INTRAVENOUS
Status: DISPENSED
Start: 2024-03-27